# Patient Record
Sex: FEMALE | Race: WHITE | NOT HISPANIC OR LATINO | Employment: OTHER | RURAL
[De-identification: names, ages, dates, MRNs, and addresses within clinical notes are randomized per-mention and may not be internally consistent; named-entity substitution may affect disease eponyms.]

---

## 2017-06-13 ENCOUNTER — CONVERSION ENCOUNTER (OUTPATIENT)
Dept: FAMILY MEDICINE CLINIC | Facility: CLINIC | Age: 67
End: 2017-06-13

## 2018-05-02 ENCOUNTER — CONVERSION ENCOUNTER (OUTPATIENT)
Dept: FAMILY MEDICINE CLINIC | Facility: CLINIC | Age: 68
End: 2018-05-02

## 2018-05-08 ENCOUNTER — HOSPITAL ENCOUNTER (OUTPATIENT)
Dept: MAMMOGRAPHY | Facility: HOSPITAL | Age: 68
Discharge: HOME | End: 2018-05-08
Attending: NURSE PRACTITIONER | Admitting: NURSE PRACTITIONER

## 2018-06-14 ENCOUNTER — CONVERSION ENCOUNTER (OUTPATIENT)
Dept: FAMILY MEDICINE CLINIC | Facility: CLINIC | Age: 68
End: 2018-06-14

## 2018-06-14 ENCOUNTER — HOSPITAL ENCOUNTER (OUTPATIENT)
Dept: GENERAL RADIOLOGY | Facility: HOSPITAL | Age: 68
Discharge: HOME | End: 2018-06-14
Attending: NURSE PRACTITIONER | Admitting: NURSE PRACTITIONER

## 2018-07-26 ENCOUNTER — CONVERSION ENCOUNTER (OUTPATIENT)
Dept: FAMILY MEDICINE CLINIC | Facility: CLINIC | Age: 68
End: 2018-07-26

## 2019-06-04 VITALS
HEIGHT: 64 IN | OXYGEN SATURATION: 95 % | HEART RATE: 80 BPM | HEIGHT: 64 IN | DIASTOLIC BLOOD PRESSURE: 82 MMHG | RESPIRATION RATE: 16 BRPM | HEART RATE: 76 BPM | HEIGHT: 64 IN | RESPIRATION RATE: 16 BRPM | SYSTOLIC BLOOD PRESSURE: 110 MMHG | HEART RATE: 73 BPM | OXYGEN SATURATION: 98 % | WEIGHT: 151 LBS | WEIGHT: 154.8 LBS | RESPIRATION RATE: 16 BRPM | BODY MASS INDEX: 26.26 KG/M2 | BODY MASS INDEX: 26.22 KG/M2 | DIASTOLIC BLOOD PRESSURE: 78 MMHG | HEART RATE: 80 BPM | SYSTOLIC BLOOD PRESSURE: 119 MMHG | WEIGHT: 153.6 LBS | BODY MASS INDEX: 25.92 KG/M2 | WEIGHT: 153.8 LBS | SYSTOLIC BLOOD PRESSURE: 127 MMHG | DIASTOLIC BLOOD PRESSURE: 74 MMHG | OXYGEN SATURATION: 96 % | RESPIRATION RATE: 16 BRPM | DIASTOLIC BLOOD PRESSURE: 75 MMHG | BODY MASS INDEX: 26.43 KG/M2 | SYSTOLIC BLOOD PRESSURE: 120 MMHG | OXYGEN SATURATION: 95 %

## 2019-06-14 ENCOUNTER — CONVERSION ENCOUNTER (OUTPATIENT)
Dept: FAMILY MEDICINE CLINIC | Facility: CLINIC | Age: 69
End: 2019-06-14

## 2019-06-14 PROBLEM — J30.9 ALLERGIC RHINITIS: Status: ACTIVE | Noted: 2019-06-14

## 2019-06-14 PROBLEM — E78.5 HYPERLIPIDEMIA: Status: ACTIVE | Noted: 2019-06-14

## 2019-06-14 PROBLEM — F41.8 MIXED ANXIETY DEPRESSIVE DISORDER: Status: ACTIVE | Noted: 2017-06-13

## 2019-06-14 PROBLEM — I10 HYPERTENSION: Status: ACTIVE | Noted: 2019-06-14

## 2019-06-14 PROBLEM — R73.03 PREDIABETES: Status: ACTIVE | Noted: 2018-06-14

## 2019-06-14 PROBLEM — G47.00 INSOMNIA: Status: ACTIVE | Noted: 2019-06-14

## 2019-06-14 PROBLEM — E66.3 OVERWEIGHT: Status: ACTIVE | Noted: 2018-05-02

## 2019-06-14 PROBLEM — M19.90 OSTEOARTHRITIS: Status: ACTIVE | Noted: 2017-06-13

## 2019-06-14 LAB
ALBUMIN SERPL-MCNC: 4.4 G/DL (ref 3.6–5.1)
ALBUMIN/GLOB SERPL: ABNORMAL {RATIO} (ref 1–2.5)
ALP SERPL-CCNC: 47 UNITS/L (ref 33–130)
ALT SERPL-CCNC: 21 UNITS/L (ref 6–29)
AST SERPL-CCNC: 24 UNITS/L (ref 10–35)
BASOPHILS # BLD AUTO: ABNORMAL 10*3/MM3 (ref 0–200)
BASOPHILS NFR BLD AUTO: 0.5 %
BILIRUB SERPL-MCNC: 1 MG/DL (ref 0.2–1.2)
BUN SERPL-MCNC: 18 MG/DL (ref 7–25)
BUN/CREAT SERPL: ABNORMAL (ref 6–22)
CALCIUM SERPL-MCNC: 9.8 MG/DL (ref 8.6–10.4)
CHLORIDE SERPL-SCNC: 107 MMOL/L (ref 98–110)
CHOLEST SERPL-MCNC: 172 MG/DL
CHOLEST/HDLC SERPL: ABNORMAL {RATIO}
CO2 CONTENT VENOUS: 25 MMOL/L (ref 20–32)
CONV % HGB A1C: ABNORMAL %
CONV COMMENT: 0.01
CONV NEUTROPHILS/100 LEUKOCYTES IN BODY FLUID BY MANUAL COUNT: 36.6 %
CONV TOTAL PROTEIN: 7.1 G/DL (ref 6.1–8.1)
CREAT UR-MCNC: 1.06 MG/DL (ref 0.6–0.93)
EOSINOPHIL # BLD AUTO: 2.7 %
EOSINOPHIL # BLD AUTO: ABNORMAL 10*3/MM3 (ref 15–500)
ERYTHROCYTE [DISTWIDTH] IN BLOOD BY AUTOMATED COUNT: 12.8 % (ref 11–15)
GLOBULIN UR ELPH-MCNC: ABNORMAL G/DL (ref 1.9–3.7)
GLUCOSE SERPL-MCNC: 101 MG/DL (ref 65–99)
HCT VFR BLD AUTO: 39.6 % (ref 35–45)
HCV AB SER DONR QL: ABNORMAL
HDLC SERPL-MCNC: 48 MG/DL
HGB BLD-MCNC: 13.1 G/DL (ref 11.7–15.5)
LDLC SERPL CALC-MCNC: ABNORMAL MG/DL
LYMPHOCYTES # BLD AUTO: ABNORMAL 10*3/MM3 (ref 850–3900)
LYMPHOCYTES NFR BLD AUTO: 50.6 %
MCH RBC QN AUTO: 31.8 PG (ref 27–33)
MCHC RBC AUTO-ENTMCNC: ABNORMAL % (ref 32–36)
MCV RBC AUTO: 96.1 FL (ref 80–100)
MONOCYTES # BLD AUTO: ABNORMAL 10*3/MICROLITER (ref 200–950)
MONOCYTES NFR BLD AUTO: 9.6 %
NEUTROPHILS # BLD AUTO: ABNORMAL 10*3/MM3 (ref 1500–7800)
PLATELET # BLD AUTO: ABNORMAL 10*3/MM3 (ref 140–400)
PMV BLD AUTO: 9.7 FL (ref 7.5–12.5)
POTASSIUM SERPL-SCNC: 4.3 MMOL/L (ref 3.5–5.3)
RBC # BLD AUTO: ABNORMAL 10*6/MM3 (ref 3.8–5.1)
SODIUM SERPL-SCNC: 140 MMOL/L (ref 135–146)
TRIGL SERPL-MCNC: 111 MG/DL
WBC # BLD AUTO: ABNORMAL K/UL (ref 3.8–10.8)

## 2019-06-14 RX ORDER — CITALOPRAM 20 MG/1
1 TABLET ORAL EVERY 24 HOURS
COMMUNITY
Start: 2015-03-06 | End: 2020-01-13

## 2019-06-14 RX ORDER — ALBUTEROL SULFATE 90 UG/1
2 AEROSOL, METERED RESPIRATORY (INHALATION) EVERY 4 HOURS PRN
COMMUNITY
Start: 2018-06-14 | End: 2020-07-01

## 2019-06-14 RX ORDER — CHLORAL HYDRATE 500 MG
1 CAPSULE ORAL 2 TIMES DAILY
COMMUNITY
Start: 2015-07-09 | End: 2020-02-24

## 2019-06-14 RX ORDER — MONTELUKAST SODIUM 10 MG/1
1 TABLET ORAL
COMMUNITY
Start: 2019-03-06 | End: 2019-09-04 | Stop reason: SDUPTHER

## 2019-06-14 RX ORDER — BIOTIN 10 MG
1 TABLET ORAL DAILY
COMMUNITY
Start: 2016-04-21

## 2019-06-14 RX ORDER — ATORVASTATIN CALCIUM 10 MG/1
1 TABLET, FILM COATED ORAL
COMMUNITY
Start: 2015-04-09 | End: 2019-06-23 | Stop reason: SDUPTHER

## 2019-06-14 RX ORDER — MELATONIN
1 EVERY 24 HOURS
COMMUNITY
Start: 2018-06-14 | End: 2020-07-01

## 2019-06-14 RX ORDER — LOSARTAN POTASSIUM 100 MG/1
1 TABLET ORAL EVERY 24 HOURS
COMMUNITY
Start: 2019-03-06 | End: 2019-09-04 | Stop reason: SDUPTHER

## 2019-06-14 RX ORDER — LANOLIN ALCOHOL/MO/W.PET/CERES
1 CREAM (GRAM) TOPICAL DAILY
COMMUNITY
Start: 2018-05-02

## 2019-06-19 ENCOUNTER — OFFICE VISIT (OUTPATIENT)
Dept: FAMILY MEDICINE CLINIC | Facility: CLINIC | Age: 69
End: 2019-06-19

## 2019-06-19 ENCOUNTER — HOSPITAL ENCOUNTER (OUTPATIENT)
Dept: MAMMOGRAPHY | Facility: HOSPITAL | Age: 69
Discharge: HOME | End: 2019-06-19
Admitting: NURSE PRACTITIONER

## 2019-06-19 VITALS
HEIGHT: 64 IN | BODY MASS INDEX: 25.85 KG/M2 | DIASTOLIC BLOOD PRESSURE: 72 MMHG | SYSTOLIC BLOOD PRESSURE: 113 MMHG | WEIGHT: 151.4 LBS | OXYGEN SATURATION: 98 % | TEMPERATURE: 98.7 F | HEART RATE: 80 BPM | RESPIRATION RATE: 18 BRPM

## 2019-06-19 DIAGNOSIS — Z72.89 OTHER PROBLEMS RELATED TO LIFESTYLE: ICD-10-CM

## 2019-06-19 DIAGNOSIS — F41.8 MIXED ANXIETY DEPRESSIVE DISORDER: ICD-10-CM

## 2019-06-19 DIAGNOSIS — E55.9 VITAMIN D DEFICIENCY: ICD-10-CM

## 2019-06-19 DIAGNOSIS — Z12.39 SCREENING FOR BREAST CANCER: ICD-10-CM

## 2019-06-19 DIAGNOSIS — M19.041 PRIMARY OSTEOARTHRITIS OF BOTH HANDS: ICD-10-CM

## 2019-06-19 DIAGNOSIS — I10 HYPERTENSION, UNSPECIFIED TYPE: ICD-10-CM

## 2019-06-19 DIAGNOSIS — R73.03 PREDIABETES: ICD-10-CM

## 2019-06-19 DIAGNOSIS — Z00.00 PREVENTATIVE HEALTH CARE: Primary | ICD-10-CM

## 2019-06-19 DIAGNOSIS — Z12.39 SCREENING BREAST EXAMINATION: ICD-10-CM

## 2019-06-19 DIAGNOSIS — N28.9 RENAL INSUFFICIENCY: ICD-10-CM

## 2019-06-19 DIAGNOSIS — J30.1 SEASONAL ALLERGIC RHINITIS DUE TO POLLEN: ICD-10-CM

## 2019-06-19 DIAGNOSIS — M85.88 OTHER SPECIFIED DISORDERS OF BONE DENSITY AND STRUCTURE, OTHER SITE: ICD-10-CM

## 2019-06-19 DIAGNOSIS — N60.11 FIBROCYSTIC DISEASE OF RIGHT BREAST: ICD-10-CM

## 2019-06-19 DIAGNOSIS — M19.042 PRIMARY OSTEOARTHRITIS OF BOTH HANDS: ICD-10-CM

## 2019-06-19 DIAGNOSIS — E78.2 MIXED HYPERLIPIDEMIA: ICD-10-CM

## 2019-06-19 LAB
BILIRUB BLD-MCNC: NEGATIVE MG/DL
CLARITY, POC: CLEAR
COLOR UR: YELLOW
GLUCOSE UR STRIP-MCNC: NEGATIVE MG/DL
KETONES UR QL: NEGATIVE
LEUKOCYTE EST, POC: ABNORMAL
NITRITE UR-MCNC: NEGATIVE MG/ML
PH UR: 5.5 [PH] (ref 5–8)
PROT UR STRIP-MCNC: NEGATIVE MG/DL
RBC # UR STRIP: NEGATIVE /UL
SP GR UR: 1.02 (ref 1–1.03)
UROBILINOGEN UR QL: NORMAL

## 2019-06-19 PROCEDURE — 81003 URINALYSIS AUTO W/O SCOPE: CPT | Performed by: NURSE PRACTITIONER

## 2019-06-19 PROCEDURE — 99214 OFFICE O/P EST MOD 30 MIN: CPT | Performed by: NURSE PRACTITIONER

## 2019-06-19 PROCEDURE — G0439 PPPS, SUBSEQ VISIT: HCPCS | Performed by: NURSE PRACTITIONER

## 2019-06-19 PROCEDURE — 77067 SCR MAMMO BI INCL CAD: CPT

## 2019-06-19 PROCEDURE — 77063 BREAST TOMOSYNTHESIS BI: CPT

## 2019-06-19 RX ORDER — LANOLIN ALCOHOL/MO/W.PET/CERES
1 CREAM (GRAM) TOPICAL DAILY
Qty: 90 TABLET | Refills: 0
Start: 2019-06-19 | End: 2020-07-01

## 2019-06-19 NOTE — PATIENT INSTRUCTIONS
Medicare Wellness  Personal Prevention Plan of Service     Date of Office Visit:  2019  Encounter Provider:  LEO Longo  Place of Service:  [facility]  Patient Name: Elizabeth Momin  :  1950    As part of the Medicare Wellness portion of your visit today, we are providing you with this personalized preventive plan of services (PPPS). This plan is based upon recommendations of the [place] Preventive Services Task Force (USPSTF) and the Advisory Committee on Immunization Practices (ACIP).    This lists the preventive care services that should be considered, and provides dates of when you are due. Items listed as completed are up-to-date and do not require any further intervention.    Health Maintenance   Topic Date Due   • ZOSTER VACCINE (1 of 2) 1999   • TDAP/TD VACCINES (2 - Td) 2016   • HEPATITIS C SCREENING  2019   • MEDICARE ANNUAL WELLNESS  2019   • COLONOSCOPY  2019   • LIPID PANEL  2019   • INFLUENZA VACCINE  2019   • DXA SCAN  06/15/2020   • PNEUMOCOCCAL VACCINES (65+ LOW/MEDIUM RISK)  Completed       Orders Placed This Encounter   Procedures   • Mammo Screening Bilateral With CAD     Order Specific Question:   Reason for Exam:     Answer:   screening for breast exam   • Hepatitis C antibody   • Vitamin D 25 hydroxy     Standing Status:   Future     Standing Expiration Date:   2020   • POC Urinalysis Dipstick, Automated       No Follow-up on file.

## 2019-06-19 NOTE — PROGRESS NOTES
Chief Complaint   Patient presents with   • Medicare Wellness-subsequent       Subjective   Elizabeth Momin is a 69 y.o. female that presents for her Annual Medicare Wellness Visit and to follow-up on multiple chronic medical conditions.     Blood Sugar Problem   This is a chronic problem. The current episode started more than 1 year ago. Associated symptoms include arthralgias and fatigue. Pertinent negatives include no abdominal pain, chest pain, chills, congestion, coughing, diaphoresis, fever, joint swelling, myalgias, nausea, neck pain, numbness, rash, sore throat, swollen glands, vomiting or weakness.   Hypertension   This is a chronic problem. The current episode started more than 1 year ago. The problem has been rapidly improving since onset. The problem is controlled. Associated symptoms include anxiety. Pertinent negatives include no blurred vision, chest pain, neck pain, palpitations or shortness of breath. Risk factors for coronary artery disease include dyslipidemia and post-menopausal state. Past treatments include ACE inhibitors. Current antihypertension treatment includes angiotensin blockers. The current treatment provides significant improvement. Compliance problems include diet.    Hyperlipidemia   This is a chronic problem. The current episode started more than 1 year ago. The problem is controlled. Recent lipid tests were reviewed and are variable. Pertinent negatives include no chest pain, myalgias or shortness of breath. Current antihyperlipidemic treatment includes statins. The current treatment provides significant improvement of lipids.   Anxiety   Presents for follow-up visit. Patient reports no chest pain, confusion, depressed mood, dizziness, excessive worry, nausea, nervous/anxious behavior, palpitations, shortness of breath or suicidal ideas. Symptoms occur rarely. The severity of symptoms is mild. The quality of sleep is good. Nighttime awakenings: occasional.     Compliance with  medications is %.        The following portions of the patient's history were reviewed and updated as appropriate: allergies, current medications, past family history, past medical history, past social history, past surgical history and problem list.    Review of Systems   Constitutional: Positive for fatigue. Negative for appetite change, chills, diaphoresis, fever, unexpected weight gain and unexpected weight loss.   HENT: Negative for congestion, ear discharge, ear pain, hearing loss, mouth sores, nosebleeds, postnasal drip, rhinorrhea, sinus pressure, sneezing, sore throat and trouble swallowing.    Eyes: Negative for blurred vision, double vision, pain, discharge, redness and itching.   Respiratory: Negative for apnea, cough, choking, chest tightness, shortness of breath, wheezing and stridor.    Cardiovascular: Negative for chest pain, palpitations and leg swelling.   Gastrointestinal: Negative for abdominal distention, abdominal pain, anal bleeding, blood in stool, constipation, diarrhea, nausea, rectal pain, vomiting, GERD and indigestion.   Endocrine: Negative for cold intolerance, heat intolerance, polydipsia, polyphagia and polyuria.   Genitourinary: Positive for breast lump. Negative for breast discharge, urinary incontinence, difficulty urinating, dysuria, flank pain, frequency, genital sores, hematuria, pelvic pain, urgency, vaginal bleeding, vaginal discharge, vaginal pain and breast pain.        Right breast - unchanged   Musculoskeletal: Positive for arthralgias. Negative for back pain, gait problem, joint swelling, myalgias, neck pain and neck stiffness.        Fingers and knees   Skin: Negative for color change, rash and skin lesions.   Neurological: Negative for dizziness, tremors, seizures, syncope, speech difficulty, weakness, light-headedness, numbness, headache, memory problem and confusion.   Hematological: Negative for adenopathy. Does not bruise/bleed easily.  "  Psychiatric/Behavioral: Negative for self-injury, sleep disturbance, suicidal ideas, depressed mood and stress. The patient is not nervous/anxious.        Objective   /72 (BP Location: Right arm, Patient Position: Sitting, Cuff Size: Large Adult)   Pulse 80   Temp 98.7 °F (37.1 °C) (Oral)   Resp 18   Ht 161.3 cm (63.5\")   Wt 68.7 kg (151 lb 6.4 oz)   SpO2 98%   Breastfeeding? No   BMI 26.40 kg/m²      Physical Exam   Constitutional: She is oriented to person, place, and time. She appears well-developed and well-nourished. No distress.   HENT:   Head: Normocephalic and atraumatic.   Right Ear: Tympanic membrane, external ear and ear canal normal.   Left Ear: Tympanic membrane, external ear and ear canal normal.   Nose: Nose normal. Right sinus exhibits no maxillary sinus tenderness and no frontal sinus tenderness. Left sinus exhibits no maxillary sinus tenderness and no frontal sinus tenderness.   Mouth/Throat: Oropharynx is clear and moist and mucous membranes are normal. No oropharyngeal exudate, posterior oropharyngeal edema or posterior oropharyngeal erythema.   Eyes: Conjunctivae, EOM and lids are normal. Pupils are equal, round, and reactive to light. Right eye exhibits no discharge. Left eye exhibits no discharge.   Neck: Trachea normal and normal range of motion. Neck supple. No JVD present. Carotid bruit is not present. No thyromegaly present.   Cardiovascular: Normal rate, regular rhythm, normal heart sounds and intact distal pulses. Exam reveals no gallop and no friction rub.   No murmur heard.  Pulmonary/Chest: Effort normal and breath sounds normal. No respiratory distress. She has no wheezes. She has no rales. Right breast exhibits mass (Retroareolar irregularly shaped density). Right breast exhibits no inverted nipple, no nipple discharge, no skin change and no tenderness. Left breast exhibits no inverted nipple, no mass, no nipple discharge, no skin change and no tenderness. Breasts " are symmetrical. There is no breast swelling.   Abdominal: Soft. Bowel sounds are normal. She exhibits no mass. There is no tenderness. No hernia.   Musculoskeletal: Normal range of motion. She exhibits no edema or deformity.   Lymphadenopathy:     She has no cervical adenopathy.     She has no axillary adenopathy.   Neurological: She is alert and oriented to person, place, and time. She has normal strength. She displays normal reflexes. No cranial nerve deficit.   Skin: Skin is warm, dry and intact. Capillary refill takes less than 2 seconds. No rash noted.   Psychiatric: She has a normal mood and affect. Her speech is normal and behavior is normal. Judgment and thought content normal. Cognition and memory are normal.     Office Visit on 06/19/2019   Component Date Value Ref Range Status   • Color 06/19/2019 Yellow  Yellow, Straw, Dark Yellow, Kendra Final   • Clarity, UA 06/19/2019 Clear  Clear Final   • Specific Gravity  06/19/2019 1.020  1.005 - 1.030 Final   • pH, Urine 06/19/2019 5.5  5.0 - 8.0 Final   • Leukocytes 06/19/2019 Trace* Negative Final   • Nitrite, UA 06/19/2019 Negative  Negative Final   • Protein, POC 06/19/2019 Negative  Negative mg/dL Final   • Glucose, UA 06/19/2019 Negative  Negative, 1000 mg/dL (3+) mg/dL Final   • Ketones, UA 06/19/2019 Negative  Negative Final   • Urobilinogen, UA 06/19/2019 Normal  Normal Final   • Bilirubin 06/19/2019 Negative  Negative Final   • Blood, UA 06/19/2019 Negative  Negative Final   Conversion Encounter on 06/14/2019   Component Date Value Ref Range Status   • Albumin 06/14/2019 4.4  3.6 - 5.1 g/dL Final   • Alkaline Phosphatase 06/14/2019 47  33 - 130 units/L Final   • Basophils Absolute 06/14/2019 22 CELLS/UL  0 - 200 10*3/mm3 Final   • Basophil Rel % 06/14/2019 0.5  % Final   • Total Bilirubin 06/14/2019 1.0  0.2 - 1.2 mg/dL Final   • BUN 06/14/2019 18  7 - 25 mg/dL Final   • BUN/Creatinine Ratio 06/14/2019 17 (calc)  6 - 22 Final   • Calcium 06/14/2019 9.8   8.6 - 10.4 mg/dL Final   • Chloride 06/14/2019 107  98 - 110 mmol/L Final   • Chol/HDL Ratio 06/14/2019 3.6 (calc)  <5.0 Final   • Total Cholesterol 06/14/2019 172  <200 mg/dL Final   • CO2 CONTENT  06/14/2019 25  20 - 32 mmol/L Final   • Creatinine 06/14/2019 1.06* 0.60 - 0.93 mg/dL Final   • eGFR African Am 06/14/2019 53* > OR = 60 mL/min/1.73m2 Final   • eGFR Non  Am 06/14/2019 62  > OR = 60 mL/min/1.73m2 Final   • Eosinophils Absolute 06/14/2019 119 CELLS/UL  15 - 500 10*3/mm3 Final   • Eosinophil Rel % 06/14/2019 2.7  % Final   • Globulin 06/14/2019 2.7 G/DL (CALC)  1.9 - 3.7 g/dL Final   • Glucose 06/14/2019 101* 65 - 99 mg/dL Final   • Hematocrit 06/14/2019 39.6  35.0 - 45.0 % Final   • HDL Cholesterol 06/14/2019 48* >50 mg/dL Final   • Hemoglobin 06/14/2019 13.1  11.7 - 15.5 g/dL Final   • % Hgb A1C 06/14/2019 5.9 % OF TOTAL HGB* <5.7 % Final   • LDL Cholesterol  06/14/2019 103 MG/DL (CALC)* mg/dL Final   • Lymphocytes Absolute 06/14/2019 2226 CELLS/UL  850 - 3900 10*3/mm3 Final   • Lymphocyte Rel % 06/14/2019 50.6  % Final   • MCH 06/14/2019 31.8  27.0 - 33.0 pg Final   • MCHC 06/14/2019 33.1 G/DL  32.0 - 36.0 % Final   • MCV 06/14/2019 96.1  80.0 - 100.0 fL Final   • Monocyte Rel % 06/14/2019 9.6  % Final   • Monocytes Absolute 06/14/2019 422 CELLS/UL  200 - 950 10*3/microliter Final   • MPV 06/14/2019 9.7  7.5 - 12.5 fL Final   • Neutrophils Absolute 06/14/2019 1610 CELLS/UL  1500 - 7800 10*3/mm3 Final   • Platelets 06/14/2019 268 THOUSAND/UL  140 - 400 10*3/mm3 Final   • Neutrophils, Fluid 06/14/2019 36.6  % Final   • Potassium 06/14/2019 4.3  3.5 - 5.3 mmol/L Final   • Total Protein 06/14/2019 7.1  6.1 - 8.1 g/dL Final   • RBC 06/14/2019 4.12 MILLION/UL  3.80 - 5.10 10*6/mm3 Final   • RDW 06/14/2019 12.8  11.0 - 15.0 % Final   • AST (SGOT) 06/14/2019 24  10 - 35 units/L Final   • ALT (SGPT) 06/14/2019 21  6 - 29 units/L Final   • Sodium 06/14/2019 140  135 - 146 mmol/L Final   • Triglycerides  06/14/2019 111  <150 mg/dL Final   • WBC 06/14/2019 4.4 THOUSAND/UL  3.8 - 10.8 K/uL Final   • A/G Ratio 06/14/2019 1.6 (calc)  1.0 - 2.5 Final         Assessment/Plan   Problems Addressed this Visit        Cardiovascular and Mediastinum    Hyperlipidemia     Reviewed lipid results with pt. LDL mildly elevated. Encouraged healthy diet and regular exercise. Recheck lipids in 1 year.          Hypertension     Stable         Relevant Orders    POC Urinalysis Dipstick, Automated (Completed)       Respiratory    Allergic rhinitis     Stable on current medication.             Musculoskeletal and Integument    Other specified disorders of bone density and structure, other site     Encouraged weight bearing exercise and increased dietary calcium intake.   DEXA due in 2020.          Osteoarthritis     Tylenol PRN. OTC topical agents PRN.   Offered Ortho referral - she declines.             Other    Fibrocystic breast disease     She had screening & diagnostic mammogram last year with ultrasound. Screening mammogram in one year recommended.          Mixed anxiety depressive disorder     Stable.         Prediabetes     A1c increasing.   Encouraged healthy diet regular exercise.   Recheck A1c in 6 months.            Other Visit Diagnoses     Preventative health care    -  Primary    Screening for breast cancer        Other problems related to lifestyle        Relevant Orders    Hepatitis C antibody    Vitamin D deficiency        Relevant Orders    Vitamin D 25 hydroxy    Renal insufficiency        Mild. Avoid NSAIDs. Stay well hydrated. Recheck in 3 months.

## 2019-06-19 NOTE — PROGRESS NOTES
QUICK REFERENCE INFORMATION:  The ABCs of Providing the Annual Wellness Visit   CMS.[facility]    Medicare Annual Wellness Visit      Subjective   History of Present Illness    Elizabeth Momin is a 69 y.o. female who presents for an Annual Wellness Visit. In addition, we addressed the following health issues:***      PMH, PSH, SocHx, FamHx, Allergies, and Medications: Reviewed and updated in the Visit Navigator.     Outpatient Medications Prior to Visit   Medication Sig Dispense Refill   • albuterol sulfate HFA (VENTOLIN HFA) 108 (90 Base) MCG/ACT inhaler Inhale 2 puffs Every 4 (Four) Hours As Needed.     • atorvastatin (LIPITOR) 10 MG tablet Take 1 tablet by mouth every night at bedtime.     • Biotin 10 MG tablet Take 1 tablet by mouth Daily.     • cholecalciferol (VITAMIN D3) 1000 units tablet Take 1 tablet by mouth Daily.     • citalopram (CELEXA) 20 MG tablet Take 1 tablet by mouth Daily.     • Cod Liver Oil capsule Take 1 capsule by mouth Daily.     • folic acid (FOLVITE) 400 MCG tablet Take 1 tablet by mouth Daily.     • losartan (COZAAR) 100 MG tablet Take 1 tablet by mouth Daily.     • montelukast (SINGULAIR) 10 MG tablet Take 1 tablet by mouth every night at bedtime.     • Multiple Vitamin (MULTI-VITAMIN) tablet Take 1 tablet by mouth Daily.     • Omega-3 Fatty Acids (FISH OIL) 1000 MG capsule capsule Take 1 capsule by mouth 2 (Two) Times a Day.     • aspirin 81 MG tablet Take 1 tablet by mouth Every 3 (Three) Days.       No facility-administered medications prior to visit.        Patient Active Problem List   Diagnosis   • Allergic rhinitis   • Fibrocystic breast disease   • Hyperlipidemia   • Hypertension   • Insomnia   • Menopausal syndrome   • Mixed anxiety depressive disorder   • Other specified disorders of bone density and structure, other site   • Overweight   • Prediabetes   • Sinusitis, chronic   • Osteoarthritis   • Temporomandibular joint disorder       Health Habits:  Dental Exam.  {status:77367}  Eye Exam. {status:99348}  Exercise: {numbers; 0-10:65572} times/week.  Current exercise activities include: {misc; exercise types:22889}    Social:  See review in SnapShot activity and in SocHx section of Visit Navigator.    Health Risk Assessment:  The patient has completed a Health Risk Assessment. This has been reviewed with them and has been scanned into {scanned:4460822035} as a separate document.    Current Medical Providers:  Patient Care Team:  Dennise Wiley APRN as PCP - General    The [facility] providers who are involved in the care of this patient are listed above. Additional providers and suppliers are listed below:  ***    Age-appropriate Screening Schedule:  Refer to the list below for future screening recommendations based on patient's age. Orders for these recommended tests are listed in the plan section. The patient has been provided with a written plan.    Health Maintenance   Topic Date Due   • ZOSTER VACCINE (1 of 2) 02/16/1999   • TDAP/TD VACCINES (2 - Td) 09/18/2016   • HEPATITIS C SCREENING  06/04/2019   • MEDICARE ANNUAL WELLNESS  06/04/2019   • COLONOSCOPY  06/04/2019   • LIPID PANEL  06/14/2019   • INFLUENZA VACCINE  08/01/2019   • DXA SCAN  06/15/2020   • PNEUMOCOCCAL VACCINES (65+ LOW/MEDIUM RISK)  Completed       Depression Screen:   PHQ-2/PHQ-9 Depression Screening 6/19/2019   Little interest or pleasure in doing things 0   Feeling down, depressed, or hopeless 0   Total Score 0       Functional and Cognitive Screening:  Functional & Cognitive Status 6/19/2019   Do you have difficulty preparing food and eating? No   Do you have difficulty bathing yourself, getting dressed or grooming yourself? No   Do you have difficulty using the toilet? No   Do you have difficulty moving around from place to place? No   Do you have trouble with steps or getting out of a bed or a chair? No   In the past year have you fallen or experienced a near fall? No   Current Diet Well  "Balanced Diet   Dental Exam Up to date   Eye Exam Up to date   Exercise (times per week) 7 times per week   Current Exercise Activities Include Housecleaning   Do you need help using the phone?  No   Are you deaf or do you have serious difficulty hearing?  No   Do you need help with transportation? No   Do you need help shopping? No   Do you need help preparing meals?  No   Do you need help with housework?  No   Do you need help with laundry? No   Do you need help taking your medications? No   Do you need help managing money? No   Do you ever drive or ride in a car without wearing a seat belt? No   Have you felt unusual stress, anger or loneliness in the last month? No   Who do you live with? Spouse   If you need help, do you have trouble finding someone available to you? No   Have you been bothered in the last four weeks by sexual problems? No   Do you have difficulty concentrating, remembering or making decisions? No       Does the patient have evidence of cognitive impairment? {Yes/No w/ pre-defaulted No:23709::\"No\"}    Advanced Care Planning:  {Advanced Directive Status:12325}    Identification of Risk Factors:  Risk factors include: {; WELLNESS RISK FACTORS:01949}.    Review of Systems    {Ros - complete:41215}    Objective     Vitals:    06/19/19 1409   BP: 113/72   BP Location: Right arm   Patient Position: Sitting   Cuff Size: Large Adult   Pulse: 80   Resp: 18   Temp: 98.7 °F (37.1 °C)   TempSrc: Oral   SpO2: 98%   Weight: 68.7 kg (151 lb 6.4 oz)   Height: 161.3 cm (63.5\")       Body mass index is 26.4 kg/m².    Assessment/Plan   Patient Self-Management and Personalized Health Advice  The patient has been provided with information about: {MC; PERSONALIZED HEALTH ADVICE:51343} and preventive services including:   · {plan:13417}.    Discussed the patient's BMI with her. The BMI {[facility]96409}.    Orders:  Orders Placed This Encounter   Procedures   • Hepatitis C antibody   • Vitamin D " 25 hydroxy   • POC Urinalysis Dipstick, Automated       Follow Up:  Return in about 1 year (around 6/19/2020) for Medicare Wellness.     An After Visit Summary and PPPS with all of these plans were given to the patient.               Problem List Items Addressed This Visit        Cardiovascular and Mediastinum    Hypertension    Relevant Orders    POC Urinalysis Dipstick, Automated (Completed)      Other Visit Diagnoses     Preventative health care    -  Primary    Screening for breast cancer        Other problems related to lifestyle        Relevant Orders    Hepatitis C antibody    Vitamin D deficiency        Relevant Orders    Vitamin D 25 hydroxy

## 2019-06-20 NOTE — ASSESSMENT & PLAN NOTE
She had screening & diagnostic mammogram last year with ultrasound. Screening mammogram in one year recommended.

## 2019-06-20 NOTE — ASSESSMENT & PLAN NOTE
Reviewed lipid results with pt. LDL mildly elevated. Encouraged healthy diet and regular exercise. Recheck lipids in 1 year.

## 2019-06-24 ENCOUNTER — RESULTS ENCOUNTER (OUTPATIENT)
Dept: FAMILY MEDICINE CLINIC | Facility: CLINIC | Age: 69
End: 2019-06-24

## 2019-06-24 DIAGNOSIS — E55.9 VITAMIN D DEFICIENCY: ICD-10-CM

## 2019-06-24 RX ORDER — ATORVASTATIN CALCIUM 10 MG/1
TABLET, FILM COATED ORAL
Qty: 90 TABLET | Refills: 3 | Status: SHIPPED | OUTPATIENT
Start: 2019-06-24 | End: 2020-07-01 | Stop reason: SDUPTHER

## 2019-07-03 DIAGNOSIS — N64.89 BREAST ASYMMETRY IN FEMALE: Primary | ICD-10-CM

## 2019-08-06 ENCOUNTER — HOSPITAL ENCOUNTER (OUTPATIENT)
Dept: MAMMOGRAPHY | Facility: HOSPITAL | Age: 69
Discharge: HOME | End: 2019-08-06
Admitting: NURSE PRACTITIONER

## 2019-08-06 ENCOUNTER — HOSPITAL ENCOUNTER (OUTPATIENT)
Dept: ULTRASOUND IMAGING | Facility: HOSPITAL | Age: 69
Discharge: HOME | End: 2019-08-06

## 2019-08-06 ENCOUNTER — TELEPHONE (OUTPATIENT)
Dept: FAMILY MEDICINE CLINIC | Facility: CLINIC | Age: 69
End: 2019-08-06

## 2019-08-06 DIAGNOSIS — N64.89 BREAST ASYMMETRY IN FEMALE: ICD-10-CM

## 2019-08-06 DIAGNOSIS — N63.10 BREAST MASS, RIGHT: Primary | ICD-10-CM

## 2019-08-06 PROCEDURE — G0279 TOMOSYNTHESIS, MAMMO: HCPCS

## 2019-08-06 PROCEDURE — 76642 ULTRASOUND BREAST LIMITED: CPT

## 2019-08-06 PROCEDURE — 77065 DX MAMMO INCL CAD UNI: CPT

## 2019-08-06 NOTE — TELEPHONE ENCOUNTER
Zee from [facility] called and said that Dr. Mills is recommending a right beast biopsy. They have already spoken to patient and scheduled her for 08/08/2019. Order is in chart and needing you to sign off on please.

## 2019-08-08 ENCOUNTER — TELEPHONE (OUTPATIENT)
Dept: FAMILY MEDICINE CLINIC | Facility: CLINIC | Age: 69
End: 2019-08-08

## 2019-08-08 ENCOUNTER — HOSPITAL ENCOUNTER (OUTPATIENT)
Dept: MAMMOGRAPHY | Facility: HOSPITAL | Age: 69
Discharge: HOME | End: 2019-08-08
Admitting: NURSE PRACTITIONER

## 2019-08-08 DIAGNOSIS — R92.8 ABNORMAL MAMMOGRAM OF RIGHT BREAST: ICD-10-CM

## 2019-08-08 DIAGNOSIS — N28.9 RENAL INSUFFICIENCY: Primary | ICD-10-CM

## 2019-08-08 PROCEDURE — 88360 TUMOR IMMUNOHISTOCHEM/MANUAL: CPT | Performed by: NURSE PRACTITIONER

## 2019-08-08 PROCEDURE — A4648 IMPLANTABLE TISSUE MARKER: HCPCS

## 2019-08-08 PROCEDURE — 25010000003 LIDOCAINE 1 % SOLUTION: Performed by: NURSE PRACTITIONER

## 2019-08-08 PROCEDURE — 88305 TISSUE EXAM BY PATHOLOGIST: CPT | Performed by: NURSE PRACTITIONER

## 2019-08-08 PROCEDURE — 88360 TUMOR IMMUNOHISTOCHEM/MANUAL: CPT

## 2019-08-08 RX ORDER — LIDOCAINE HYDROCHLORIDE AND EPINEPHRINE 10; 10 MG/ML; UG/ML
20 INJECTION, SOLUTION INFILTRATION; PERINEURAL ONCE
Status: COMPLETED | OUTPATIENT
Start: 2019-08-08 | End: 2019-08-08

## 2019-08-08 RX ORDER — LIDOCAINE HYDROCHLORIDE 10 MG/ML
3 INJECTION, SOLUTION INFILTRATION; PERINEURAL ONCE
Status: COMPLETED | OUTPATIENT
Start: 2019-08-08 | End: 2019-08-08

## 2019-08-08 RX ADMIN — LIDOCAINE HYDROCHLORIDE 3 ML: 10 INJECTION, SOLUTION INFILTRATION; PERINEURAL at 13:12

## 2019-08-08 RX ADMIN — LIDOCAINE HYDROCHLORIDE AND EPINEPHRINE 20 ML: 10; 10 INJECTION, SOLUTION INFILTRATION; PERINEURAL at 13:13

## 2019-08-08 NOTE — TELEPHONE ENCOUNTER
----- Message from LEO Longo sent at 6/19/2019  2:50 PM EDT -----  Regarding: bmp due  Have patient get bmp TO F/UP on kidney test.

## 2019-08-10 LAB
BUN SERPL-MCNC: 12 MG/DL (ref 8–27)
BUN/CREAT SERPL: 13 (ref 12–28)
CALCIUM SERPL-MCNC: 10 MG/DL (ref 8.7–10.3)
CHLORIDE SERPL-SCNC: 103 MMOL/L (ref 96–106)
CO2 SERPL-SCNC: 23 MMOL/L (ref 20–29)
CREAT SERPL-MCNC: 0.89 MG/DL (ref 0.57–1)
GLUCOSE SERPL-MCNC: 93 MG/DL (ref 65–99)
POTASSIUM SERPL-SCNC: 4.2 MMOL/L (ref 3.5–5.2)
SODIUM SERPL-SCNC: 139 MMOL/L (ref 134–144)

## 2019-08-12 ENCOUNTER — TELEPHONE (OUTPATIENT)
Dept: FAMILY MEDICINE CLINIC | Facility: CLINIC | Age: 69
End: 2019-08-12

## 2019-08-12 DIAGNOSIS — C50.911 PRIMARY INVASIVE MALIGNANT NEOPLASM OF FEMALE BREAST, RIGHT (HCC): Primary | ICD-10-CM

## 2019-08-12 NOTE — TELEPHONE ENCOUNTER
Called patient on cell phone and was unable to reach, left message on machine. Attempted to call patients home number and there was no answer.

## 2019-08-12 NOTE — TELEPHONE ENCOUNTER
Please see if patient can come in sometime this morning before 10:30 to go over her breast biopsy results.

## 2019-08-13 ENCOUNTER — RESULTS ENCOUNTER (OUTPATIENT)
Dept: FAMILY MEDICINE CLINIC | Facility: CLINIC | Age: 69
End: 2019-08-13

## 2019-08-13 DIAGNOSIS — N28.9 RENAL INSUFFICIENCY: ICD-10-CM

## 2019-08-13 LAB
LAB AP CASE REPORT: NORMAL
LAB AP DIAGNOSIS COMMENT: NORMAL
LAB AP IHC HER2/NEU REPORT,ADDENDUM: NORMAL
PATH REPORT.FINAL DX SPEC: NORMAL
PATH REPORT.GROSS SPEC: NORMAL

## 2019-09-05 RX ORDER — LOSARTAN POTASSIUM 100 MG/1
TABLET ORAL
Qty: 90 TABLET | Refills: 0 | Status: SHIPPED | OUTPATIENT
Start: 2019-09-05 | End: 2019-12-05 | Stop reason: SDUPTHER

## 2019-09-05 RX ORDER — MONTELUKAST SODIUM 10 MG/1
TABLET ORAL
Qty: 90 TABLET | Refills: 0 | Status: SHIPPED | OUTPATIENT
Start: 2019-09-05 | End: 2020-02-10

## 2019-11-11 ENCOUNTER — OFFICE VISIT (OUTPATIENT)
Dept: FAMILY MEDICINE CLINIC | Facility: CLINIC | Age: 69
End: 2019-11-11

## 2019-11-11 VITALS
RESPIRATION RATE: 18 BRPM | TEMPERATURE: 98.3 F | OXYGEN SATURATION: 98 % | HEART RATE: 104 BPM | BODY MASS INDEX: 25.2 KG/M2 | HEIGHT: 64 IN | SYSTOLIC BLOOD PRESSURE: 100 MMHG | DIASTOLIC BLOOD PRESSURE: 63 MMHG | WEIGHT: 147.6 LBS

## 2019-11-11 DIAGNOSIS — R50.9 FEVER, UNSPECIFIED FEVER CAUSE: Primary | ICD-10-CM

## 2019-11-11 PROBLEM — Z17.0: Status: ACTIVE | Noted: 2019-08-19

## 2019-11-11 PROBLEM — C50.911: Status: ACTIVE | Noted: 2019-08-19

## 2019-11-11 PROBLEM — C50.911 BREAST CANCER, RIGHT (HCC): Status: ACTIVE | Noted: 2019-08-21

## 2019-11-11 PROBLEM — I10 HYPERTENSION: Status: ACTIVE | Noted: 2019-08-19

## 2019-11-11 PROBLEM — R73.03 PREDIABETES: Status: ACTIVE | Noted: 2019-08-19

## 2019-11-11 PROBLEM — Z90.11 S/P RIGHT MASTECTOMY: Status: ACTIVE | Noted: 2019-10-25

## 2019-11-11 PROBLEM — E78.5 DYSLIPIDEMIA: Status: ACTIVE | Noted: 2019-08-19

## 2019-11-11 LAB
EXPIRATION DATE: NORMAL
FLUAV AG NPH QL: NEGATIVE
FLUBV AG NPH QL: NEGATIVE
INTERNAL CONTROL: NORMAL
Lab: NORMAL

## 2019-11-11 PROCEDURE — 87804 INFLUENZA ASSAY W/OPTIC: CPT | Performed by: FAMILY MEDICINE

## 2019-11-11 PROCEDURE — 99213 OFFICE O/P EST LOW 20 MIN: CPT | Performed by: FAMILY MEDICINE

## 2019-11-11 RX ORDER — LETROZOLE 2.5 MG/1
2.5 TABLET, FILM COATED ORAL DAILY
COMMUNITY
Start: 2019-09-20

## 2019-11-11 NOTE — PROGRESS NOTES
Fever    This is a new problem. The current episode started today. The problem occurs 2 to 4 times per day. The problem has been unchanged. The maximum temperature noted was 100 to 100.9 F. The temperature was taken using an oral thermometer. Associated symptoms include congestion. Pertinent negatives include no abdominal pain, chest pain, coughing, diarrhea, ear pain, headaches, nausea, rash, sore throat or vomiting. She has tried NSAIDs for the symptoms. The treatment provided moderate relief.     Past Medical History:   Diagnosis Date   • Allergic rhinitis    • Anxiety    • Chronic insomnia    • Chronic sinusitis    • Depression    • Fibrocystic breast disease    • History of skin cancer    • Hyperlipidemia    • Hypertension    • Osteoarthritis    • Osteopenia    • Pre-diabetes    • Primary invasive malignant neoplasm of female breast, right (CMS/HCC) 08/2019     Past Surgical History:   Procedure Laterality Date   • APPENDECTOMY  1968   • BREAST BIOPSY Right 08/2019    Invasive Mammary Carcinoma   • MASTECTOMY Right 10/02/2019    Fabrizio Bernabe   • MASTECTOMY PARTIAL WITH AXILLARY LYMPH NODE EXCISION Right 09/13/2019    Dr. Storm Dinh   • TOTAL ABDOMINAL HYSTERECTOMY WITH SALPINGO OOPHORECTOMY  1995    Fibroids     Family History   Problem Relation Age of Onset   • Hypertension Mother    • Stroke Mother    • Diabetes Sister    • Hypertension Sister    • Mental illness Sister         Psychiatric Care - Paranoid Schizophrenia   • Hypertension Brother    • Cancer Other         Aunt Colon Cancer. Nephew Lung Cancer (heavy smoker).      Social History     Tobacco Use   • Smoking status: Never Smoker   • Smokeless tobacco: Never Used   • Tobacco comment: Passive Smoke Exposure: No    Substance Use Topics   • Alcohol use: No     Frequency: Never     PHQ-2 Depression Screening  Little interest or pleasure in doing things?     Feeling down, depressed, or hopeless?     PHQ-2 Total Score       PHQ-9 Depression  "Screening  Little interest or pleasure in doing things?     Feeling down, depressed, or hopeless?     Trouble falling or staying asleep, or sleeping too much?     Feeling tired or having little energy?     Poor appetite or overeating?     Feeling bad about yourself - or that you are a failure or have let yourself or your family down?     Trouble concentrating on things, such as reading the newspaper or watching television?     Moving or speaking so slowly that other people could have noticed? Or the opposite - being so fidgety or restless that you have been moving around a lot more than usual?     Thoughts that you would be better off dead, or of hurting yourself in some way?     PHQ-9 Total Score     If you checked off any problems, how difficult have these problems made it for you to do your work, take care of things at home, or get along with other people?         Review of Systems   Constitutional: Positive for fever. Negative for fatigue.   HENT: Positive for congestion, postnasal drip, rhinorrhea, sinus pressure, sneezing and swollen glands. Negative for ear pain and sore throat.    Respiratory: Positive for shortness of breath. Negative for cough.    Cardiovascular: Positive for palpitations. Negative for chest pain.   Gastrointestinal: Negative for abdominal pain, diarrhea, nausea, vomiting, GERD and indigestion.   Skin: Negative for rash.   Neurological: Negative for headache.     Vitals:    11/11/19 1322   BP: 100/63   BP Location: Left arm   Patient Position: Sitting   Cuff Size: Adult   Pulse: 104   Resp: 18   Temp: 98.3 °F (36.8 °C)   TempSrc: Oral   SpO2: 98%   Weight: 67 kg (147 lb 9.6 oz)   Height: 161.3 cm (63.5\")     Body mass index is 25.74 kg/m².  Physical Exam   Constitutional: She appears well-developed and well-nourished. No distress.   HENT:   Head: Normocephalic and atraumatic.   Right Ear: Hearing, tympanic membrane, external ear and ear canal normal.   Left Ear: Hearing, tympanic membrane, " external ear and ear canal normal.   Nose: Nose normal.   Mouth/Throat: Uvula is midline, oropharynx is clear and moist and mucous membranes are normal. Tonsils are 0 on the right. Tonsils are 0 on the left. No tonsillar exudate.   Pulmonary/Chest: Effort normal and breath sounds normal.     Office Visit on 11/11/2019   Component Date Value Ref Range Status   • Rapid Influenza A Ag 11/11/2019 Negative  Negative Final   • Rapid Influenza B Ag 11/11/2019 Negative  Negative Final   • Internal Control 11/11/2019 Passed  Passed Final   • Lot Number 11/11/2019 [id number]   Final   • Expiration Date 11/11/2019 [id number]   Final         Diagnoses and all orders for this visit:    1. Fever, unspecified fever cause (Primary)  Assessment & Plan:  Increase fluids. Tylenol and Advil prn. Report worsening or persistence of symptoms. Discussed medications to help with symptoms of upper respiratory infection including cough suppressants, decongestants, anti-inflammatory medications, antihistamines, and antipyretics.  Patient was given a list of medications with appropriate dosages. Flu swab is negative.     Orders:  -     POC Influenza A / B

## 2019-11-11 NOTE — ASSESSMENT & PLAN NOTE
Increase fluids. Tylenol and Advil prn. Report worsening or persistence of symptoms. Discussed medications to help with symptoms of upper respiratory infection including cough suppressants, decongestants, anti-inflammatory medications, antihistamines, and antipyretics.  Patient was given a list of medications with appropriate dosages. Flu swab is negative.

## 2019-11-13 ENCOUNTER — CONSULT (OUTPATIENT)
Dept: RADIATION ONCOLOGY | Facility: HOSPITAL | Age: 69
End: 2019-11-13

## 2019-11-13 VITALS
OXYGEN SATURATION: 98 % | DIASTOLIC BLOOD PRESSURE: 85 MMHG | BODY MASS INDEX: 25.37 KG/M2 | RESPIRATION RATE: 18 BRPM | WEIGHT: 148.6 LBS | HEIGHT: 64 IN | TEMPERATURE: 98.6 F | SYSTOLIC BLOOD PRESSURE: 146 MMHG | HEART RATE: 75 BPM

## 2019-11-13 DIAGNOSIS — C50.911: Primary | ICD-10-CM

## 2019-11-13 DIAGNOSIS — Z17.0: Primary | ICD-10-CM

## 2019-11-13 PROCEDURE — G0463 HOSPITAL OUTPT CLINIC VISIT: HCPCS | Performed by: RADIOLOGY

## 2019-11-13 RX ORDER — SENNOSIDES 8.6 MG
650 CAPSULE ORAL
COMMUNITY

## 2019-11-13 NOTE — PROGRESS NOTES
"Radiation Oncology Consult Note    Name: Elizabeth Momin  YOB: 1950  MRN #: [id number]  Date of Service: 11/13/2019  Referring Provider: Dennise Wiley APRN [address] [place] [address]  Primary Care Provider: Dennise Wiley APRN          DIAGNOSIS: Right breast, Multifocal invasive lobular carcinoma, One tumor ER+MO+Her2-; Second tumor is ER+MO-Her2-  Pathologic [id number](sn)M0    Encounter Diagnosis   Name Primary?   • Stage II lobular carcinoma of breast, ER+, right (CMS/HCC) Yes     /85   Pulse 75   Temp 98.6 °F (37 °C) (Oral)   Resp 18   Ht 161.3 cm (63.5\")   Wt 67.4 kg (148 lb 9.6 oz)   SpO2 98%   BMI 25.91 kg/m²     REASON FOR CONSULTATION/CHIEF COMPLAINT:  \"breast cancer--want my radiation closer to home\"  I was asked to see the patient at the request of the referring provider noted below for advice and recommendations regarding this diagnosis and the role of radiation therapy.                              REQUESTING PHYSICIAN:  Dennise Wiley Aprn [address] [place] [address]    RECORDS OBTAINED:  Records of the patients history including those obtained from the referring provider were reviewed and summarized in detail.    HISTORY OF PRESENT ILLNESS:  Elizabeth Momin is a 69 y.o. female who presented after bilateral screening MMG at [facility] on 06/19/19 noting a 11mm asymmetry in the subareolar right breast and a 6 mm developing asymmetry in the lower, slightly inner right breast, posterior depth.  There were no other noted masses or microcalcifications.    On 08/06/19, Right breast diagnostic & u/s: an ill-defined density in the retro-areolar region; no discrete mass; at posterior depth at 5-6:00, there is a 6 mm focal asymmetry with irregular margins measuring 9 cm from the nipple.    She was noted to have a palpable lump within the right retro-areolar region with subtle nipple inversion at that time; the U/S did not show clear " mass.    Stereotactic right breast core biopsy on 08/08/19 was positive for invasive mammary carcinoma, ER+ (100%), MT-, Her2 -.    She elected and went for a right lumpectomy, SLN on 09/13/19: 2 separate tumors, 10 mm lesion and a 18 mm involving the nipple complex.  Both are invasive lobular carcinoma; the subareolar breast lesion did invade the dermis and demonstrated focal perineural invasion; ER/MT were different between the two tumors as noted above.  Two of the 3 sentinel lymph nodes contained macrometastases, focal MARGARET was noted in 1 of the nodes with tumor deposits of 1.5 and 1.4 cms.  She did have new inferior margin focally positive for invasive carcinoma.      She talked with her surgeon, Dr. Dinh about the path and on 10/2/19 went for right total mastectomy, axillary dissection of the right breat: Breast with multiple previous biopsy sites, no residual invasive carcinoma identified; focal atypical lobular hyperplasia (ALH); examined surgical margins are negative, 9 recovered benign lymph nodes negative for metastatic carcinoma.    She saw her surgeon post-operative for sympatomatic seroma and has continued with drains in place.    She saw Dr. Stockton for adjuvant systemic options; Mammaprint study was obtained returning low risk for both tumors and she will not receive adjuvant chemotherapy; She will receive letrozole daily and Zometa every 6 months per chart review.    Clinically, she is doing well but has many questions about her drains; again her mastectomy was on 10/2/19.  Was to see Dr. Dinh this Thursday if drainage was down but it is not so will likely see the week of Marthagiving she notes.   She denies chest wall pain or discomfort; she denies lymphedema or decreased range of motion in the right upper extremity.  I have asked her to ask Dr. Dinh about PT/OT.    She has seen radiation oncology and been recommended PMRT and regional XRT due to size of disease in nodes and MARGARET; but wanted second  opinion and would want XRT closer to home if needed.    The following portions of the patient's history were reviewed and updated as appropriate: allergies, current medications, past family history, past medical history, past social history, past surgical history and problem list. Reviewed with the patient and remain unchanged.    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  MEDICATIONS:   Current Outpatient Medications:   •  albuterol sulfate HFA (VENTOLIN HFA) 108 (90 Base) MCG/ACT inhaler, Inhale 2 puffs Every 4 (Four) Hours As Needed., Disp: , Rfl:   •  atorvastatin (LIPITOR) 10 MG tablet, TAKE 1 TABLET BY MOUTH ONCE DAILY AT BEDTIME, Disp: 90 tablet, Rfl: 3  •  Biotin 10 MG tablet, Take 1 tablet by mouth Daily., Disp: , Rfl:   •  cholecalciferol (VITAMIN D3) 1000 units tablet, Take 1 tablet by mouth Daily., Disp: , Rfl:   •  citalopram (CELEXA) 20 MG tablet, Take 1 tablet by mouth Daily., Disp: , Rfl:   •  Cod Liver Oil capsule, Take 1 capsule by mouth Daily., Disp: , Rfl:   •  folic acid (FOLVITE) 400 MCG tablet, Take 1 tablet by mouth Daily., Disp: , Rfl:   •  glucosamine-chondroitin (MAX GLUCOSAMINE CHONDROITIN) 500-400 MG per tablet, Take 1 tablet by mouth Daily., Disp: 90 tablet, Rfl: 0  •  letrozole (FEMARA) 2.5 MG tablet, Take 2.5 mg by mouth Daily., Disp: , Rfl:   •  losartan (COZAAR) 100 MG tablet, TAKE 1 TABLET BY MOUTH ONCE DAILY, Disp: 90 tablet, Rfl: 0  •  montelukast (SINGULAIR) 10 MG tablet, TAKE 1 TABLET BY MOUTH AT BEDTIME, Disp: 90 tablet, Rfl: 0  •  Multiple Vitamin (MULTI-VITAMIN) tablet, Take 1 tablet by mouth Daily., Disp: , Rfl:   •  Omega-3 Fatty Acids (FISH OIL) 1000 MG capsule capsule, Take  1 capsule by mouth 2 (Two) Times a Day., Disp: , Rfl:   ALLERGIES: No Known Allergies  PAST SURGICAL HISTORY: she has a past surgical history that includes Total abdominal hysterectomy w/ bilateral salpingoophorectomy (); Appendectomy (); Breast biopsy (Right, 2019); mastectomy partial with axillary lymph node excision (Right, 2019); and Mastectomy (Right, 10/02/2019).  PREVIOUS RADIOTHERAPY OR CHEMOTHERAPY:No  FAMILY HISTORY: her family history includes Cancer in an other family member; Diabetes in her sister; Hypertension in her brother, mother, and sister; Mental illness in her sister; Stroke in her mother.  SOCIAL HISTORY: she  reports that she has never smoked. She has never used smokeless tobacco. She reports that she does not drink alcohol or use drugs.  PAIN AND PAIN MANAGEMENT: There were no vitals filed for this visit.  NUTRITIONAL STATUS:  No issues.  KPS: 90  ECO    Review of Systems:   Review of Systems   Allergic/Immunologic: Positive for environmental allergies.   Psychiatric/Behavioral: The patient is nervous/anxious.         Patient takes celexa to manage anxiety.     Patient still has drains in.     Otherwise negative as below.     General: No fevers, chills, weight change, or drenching night sweats. Skin: No rashes or jaundice.  HEENT: No change in vision or hearing, no headaches.  Neck: No dysphagia or masses.  Heme/Lymph: No easy bruising or bleeding.  Respiratory System: No shortness of breath or cough.  Cardiovascular: No chest pain, palpitations, or dyspnea on exertion.  - Pacemaker. GI: No nausea, vomiting, diarrhea, melena, or hematochezia.  : No dysuria or hematuria.  Endocrine: No heat or cold intolerance. Musculoskeletal: No myalgias or arthralgias.  Neuro: No weakness, numbness, syncope, or seizures. Psych: No mood changes or depression. Ext: Denies swelling.        Objective     Vitals:  /85   Pulse 75   Temp 98.6 °F (37 °C) (Oral)   Resp 18   Ht 161.3  "cm (63.5\")   Wt 67.4 kg (148 lb 9.6 oz)   SpO2 98%   BMI 25.91 kg/m²     PHYSICAL EXAM:  GENERAL: in no apparent distress, sitting comfortably in room.    HEENT: normocephalic, atraumatic. Pupils are equal, round, reactive to light. Sclera anicteric. Conjunctiva not injected. Oropharynx without erythema, ulcerations or thrush.   NECK: Supple with no masses.  LYMPHATIC: no cervical, supraclavicular or axillary adenopathy appreciated bilaterally.   CARDIOVASCULAR: S1 & S2 detected; no murmurs, rubs or gallops.  CHEST: clear to auscultation bilaterally; no wheezes, crackles or rubs. Work of breathing normal.  ABDOMEN: bowel sounds present. Abdomen is soft, nontender, nondistended.   MUSCULOSKELETAL: no tenderness to palpation along the spine or scapulae. Normal range of motion.  EXTREMITIES: no clubbing, cyanosis, edema.  SKIN: no erythema, rashes, ulcerations noted.   NEUROLOGIC: cranial nerves II-XII grossly intact bilaterally. No focal neurologic deficits.  PSYCHIATRIC:  alert, aware, and appropriate.  BREASTS : Post-mastectomy-Drains still intact. Medial seroma noted, incision well healed, no adenopathy or lymphedema appreciatecd.    PERTINENT IMAGING/PATHOLOGY/LABS (Medical Decision Making): Mammo Stereotactic Breast Biopsy Initial With & Without Device and Tissue Pathology Exam both performed on 8/8/2019 at [facility] as noted above.    COORDINATION OF CARE: A copy of this note is sent to the referring provider.    PATHOLOGY (Reviewed): As noted above; went over in detail with the patient and her .            IMAGING (Reviewed): Mammo Diagnostic Digital Tomosynthesis Right With CAD  IMPRESSION:  1. Palpable right breast subareolar mass on physical examination. There  is increased density on the mammogram. Sonographic assessment is  difficult due to subareolar position. Recommend surgical consultation  for consideration of excisional biopsy. Consultation should be scheduled  after results from " stereotactic breast biopsy of the right breast focal  asymmetry are obtained.     2. Indeterminate right breast focal asymmetry within the lower right  breast. Recommend stereotactic right breast biopsy for definitive tissue  diagnosis.     The findings and recommendations were discussed with patient at the time  of the examination. The patient will be scheduled and the referring  physician office will be notified by the patient navigator.     BI-RADS ASSESSMENT: BI-RADS 4. Suspicious abnormality.     She did have CT chest w/contrast on 10/19/19: Ordered due to pain--simple appearing seroma present   At right mastectomy operative site; no mets noted.      LABS (Reviewed):  Hematology WBC   Date Value Ref Range Status   11/07/2019 5.52 4.5 - 11.0 10*3/uL Final     RBC   Date Value Ref Range Status   11/07/2019 4.18 4.0 - 5.2 10*6/uL Final     Hemoglobin   Date Value Ref Range Status   11/07/2019 13.3 12.0 - 16.0 g/dL Final     Hematocrit   Date Value Ref Range Status   11/07/2019 40.6 36.0 - 46.0 % Final     Platelets   Date Value Ref Range Status   11/07/2019 281 140 - 440 10*3/uL Final      Chemistry   Lab Results   Component Value Date    GLUCOSE 101 (H) 06/14/2019    BUN 17 11/07/2019    CREATININE 0.8 11/07/2019    EGFRIFNONA 66 08/09/2019    EGFRIFAFRI 76 08/09/2019    BCR 21.3 11/07/2019    K 3.8 11/07/2019    CO2 23 11/07/2019    CALCIUM 9.8 11/07/2019    ALBUMIN 4.4 11/07/2019    LABIL2 1.3 11/07/2019    AST 25 11/07/2019    ALT 25 11/07/2019       Assessment/Plan     ASSESSMENT AND PLAN:  1. Stage II lobular carcinoma of breast, ER+, right (CMS/HCC)     Right breast, Multifocal invasive lobular carcinoma, One tumor ER+CA+Her2-; Second tumor is ER+CA-Her2-  Pathologic [id number](sn)M0  -Initial lumpectomy followed by  mastectomy for + margins and 2/3 SLNs +; EPE noted on one node;  Patient had 9 additional nodes removed and all were negative and no additional cancer was found on removed breast.    - We  discussed the NCCN guidelines in detail with her today.  I also agreed with her prior Radiation Oncologist that her main indication for adjuvant radiation therapy is multiple positive lymph nodes (> 1cm each) with focal extracapsular extension.  She did have a near 2 cm subareolar lesion that demonstrated invasion of the dermis and focal perineural invasion.    -She has clear surgical margins now.  -Mammaprint returned low risk and she therefore will be treated with Letrzole and Zometa.  -I did discuss the [place]-analysis, the prior BC and [place] studies and we discussed what following with adjuvant hormonal therapy would look like.  She is concerned about the local risk even if potential OS benefit would not be seen in the setting of less than 4 nodes.  We spent much time today in this discussion and she will be scheduled for CT simulation to treat the CW and regional lymphatics after her drains are removed.    She is to call us once she has the timeline of her drains for CT simulation.    This assessment comes from my review of the imaging, pathology, physician notes and other pertinent information as mentioned.    TIME SPENT WITH PATIENT:   I spent greater than 60 minutes in face-to-face time with the patient and  minutes of that time were spent in counseling and coordination of care, including review of imaging and pathology; indications, goals, logistics, alternatives and risks - both common and rare - for my recommendations as well as surveillance and potential outcomes.         CC: Dennise Wiley, Dennise Monahan, MD Neville Sheikh MD John A. Cox, MD  11/13/2019  5:10PM      Encounter Approved by:     Jevon López MD  11/24/19  11:50 AM

## 2019-11-13 NOTE — PROGRESS NOTES
Radiation Oncology Consult Note    Name: Elizabeth Momin  YOB: 1950  MRN #: [id number]  Date of Service: 11/13/2019  Referring Provider: Dennise Wiley APRN  [address] [place] [address]  Primary Care Provider: Dennise Wiley APRN          DIAGNOSIS: No diagnosis found.    REASON FOR CONSULTATION/CHIEF COMPLAINT:  ***  I was asked to see the patient at the request of the referring provider noted below for advice and recommendations regarding this diagnosis and the role of radiation therapy.                              REQUESTING PHYSICIAN:  Dennise Wiley Aprn  [address] [place] [address]    RECORDS OBTAINED:  Records of the patients history including those obtained from the referring provider were reviewed and summarized in detail.    HISTORY OF PRESENT ILLNESS:  Elizabeth Momin is a 69 y.o. female         The following portions of the patient's history were reviewed and updated as appropriate: allergies, current medications, past family history, past medical history, past social history, past surgical history and problem list. Reviewed with the patient and remain unchanged.    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  MEDICATIONS:   Current Outpatient Medications:   •  albuterol sulfate HFA (VENTOLIN HFA) 108 (90 Base) MCG/ACT inhaler, Inhale 2 puffs Every 4 (Four) Hours As Needed., Disp: , Rfl:   •  atorvastatin (LIPITOR) 10 MG tablet, TAKE 1 TABLET BY MOUTH ONCE DAILY AT BEDTIME, Disp: 90 tablet, Rfl: 3  •  Biotin 10 MG tablet, Take 1 tablet by mouth Daily., Disp: , Rfl:   •  cholecalciferol (VITAMIN D3) 1000 units tablet, Take 1 tablet by mouth Daily., Disp:  , Rfl:   •  citalopram (CELEXA) 20 MG tablet, Take 1 tablet by mouth Daily., Disp: , Rfl:   •  Cod Liver Oil capsule, Take 1 capsule by mouth Daily., Disp: , Rfl:   •  folic acid (FOLVITE) 400 MCG tablet, Take 1 tablet by mouth Daily., Disp: , Rfl:   •  glucosamine-chondroitin (MAX GLUCOSAMINE CHONDROITIN) 500-400 MG per tablet, Take 1 tablet by mouth Daily., Disp: 90 tablet, Rfl: 0  •  letrozole (FEMARA) 2.5 MG tablet, Take 2.5 mg by mouth Daily., Disp: , Rfl:   •  losartan (COZAAR) 100 MG tablet, TAKE 1 TABLET BY MOUTH ONCE DAILY, Disp: 90 tablet, Rfl: 0  •  montelukast (SINGULAIR) 10 MG tablet, TAKE 1 TABLET BY MOUTH AT BEDTIME, Disp: 90 tablet, Rfl: 0  •  Multiple Vitamin (MULTI-VITAMIN) tablet, Take 1 tablet by mouth Daily., Disp: , Rfl:   •  Omega-3 Fatty Acids (FISH OIL) 1000 MG capsule capsule, Take 1 capsule by mouth 2 (Two) Times a Day., Disp: , Rfl:   ALLERGIES: No Known Allergies  PAST SURGICAL HISTORY: she has a past surgical history that includes Total abdominal hysterectomy w/ bilateral salpingoophorectomy (1995); Appendectomy (1968); Breast biopsy (Right, 08/2019); mastectomy partial with axillary lymph node excision (Right, 09/13/2019); and Mastectomy (Right, 10/02/2019).  PREVIOUS RADIOTHERAPY OR CHEMOTHERAPY: {yes and no:48178}  FAMILY HISTORY: her family history includes Cancer in an other family member; Diabetes in her sister; Hypertension in her brother, mother, and sister; Mental illness in her sister; Stroke in her mother.  SOCIAL HISTORY: she  reports that she has never smoked. She has never used smokeless tobacco. She reports that she does not drink alcohol or use drugs.  PAIN AND PAIN MANAGEMENT: There were no vitals filed for this visit.  NUTRITIONAL STATUS:  Otherwise no issues  KPS: {[facility] Performance Status:94155}  ECOG: {[facility] ECOG Status:69847}       Review of Systems:   Review of Systems     Otherwise negative as below.     General: No fevers, chills, weight change,  or drenching night sweats. Skin: No rashes or jaundice.  HEENT: No change in vision or hearing, no headaches.  Neck: No dysphagia or masses.  Heme/Lymph: No easy bruising or bleeding.  Respiratory System: No shortness of breath or cough.  Cardiovascular: No chest pain, palpitations, or dyspnea on exertion.  +/- Pacemaker. GI: No nausea, vomiting, diarrhea, melena, or hematochezia.  : No dysuria or hematuria.  Endocrine: No heat or cold intolerance. Musculoskeletal: No myalgias or arthralgias.  Neuro: No weakness, numbness, syncope, or seizures. Psych: No mood changes or depression. Ext: Denies swelling.        Objective     Vitals:  There were no vitals filed for this visit.      PHYSICAL EXAM:  GENERAL: in no apparent distress, sitting comfortably in room.    HEENT: normocephalic, atraumatic. Pupils are equal, round, reactive to light. Sclera anicteric. Conjunctiva not injected. Oropharynx without erythema, ulcerations or thrush.   NECK: Supple with no masses.  LYMPHATIC: no cervical, supraclavicular or axillary adenopathy appreciated bilaterally.   CARDIOVASCULAR: S1 & S2 detected; no murmurs, rubs or gallops.  CHEST: clear to auscultation bilaterally; no wheezes, crackles or rubs. Work of breathing normal.  ABDOMEN: bowel sounds present. Abdomen is soft, nontender, nondistended.   MUSCULOSKELETAL: no tenderness to palpation along the spine or scapulae. Normal range of motion.  EXTREMITIES: no clubbing, cyanosis, edema.  SKIN: no erythema, rashes, ulcerations noted.   NEUROLOGIC: cranial nerves II-XII grossly intact bilaterally. No focal neurologic deficits.  PSYCHIATRIC:  alert, aware, and appropriate.  BREASTS *** (Enter for standard Template if female): Left breast unremarkable with no dominant masses, skin changes, discharge or pain; Right breast unremarkable with no dominant masses, skin changes, discharge or pain.  GYN: ***  RECTAL EXAMINATION *** (Enter for standard Template if male): External skin  normal, normal sphincter tone, prostate normal in size and consistency, no nodules, stool was guaiac negative.    PERTINENT IMAGING/PATHOLOGY/LABS (Medical Decision Making):     COORDINATION OF CARE: A copy of this note is sent to the referring provider.    PATHOLOGY (Reviewed):     IMAGING (Reviewed):     LABS (Reviewed):  Hematology WBC   Date Value Ref Range Status   11/07/2019 5.52 4.5 - 11.0 10*3/uL Final     RBC   Date Value Ref Range Status   11/07/2019 4.18 4.0 - 5.2 10*6/uL Final     Hemoglobin   Date Value Ref Range Status   11/07/2019 13.3 12.0 - 16.0 g/dL Final     Hematocrit   Date Value Ref Range Status   11/07/2019 40.6 36.0 - 46.0 % Final     Platelets   Date Value Ref Range Status   11/07/2019 281 140 - 440 10*3/uL Final      Chemistry   Lab Results   Component Value Date    GLUCOSE 101 (H) 06/14/2019    BUN 17 11/07/2019    CREATININE 0.8 11/07/2019    EGFRIFNONA 66 08/09/2019    EGFRIFAFRI 76 08/09/2019    BCR 21.3 11/07/2019    K 3.8 11/07/2019    CO2 23 11/07/2019    CALCIUM 9.8 11/07/2019    ALBUMIN 4.4 11/07/2019    LABIL2 1.3 11/07/2019    AST 25 11/07/2019    ALT 25 11/07/2019       Assessment/Plan     ASSESSMENT AND PLAN:  No diagnosis found.     No orders of the defined types were placed in this encounter.      This assessment comes from my review of the imaging, pathology, physician notes and other pertinent information as mentioned.    DISPOSITION:     [unfilled]    TIME SPENT WITH PATIENT:   I spent greater than *** minutes in face-to-face time with the patient and *** minutes of that time were spent in counseling and coordination of care, including review of imaging and pathology; indications, goals, logistics, alternatives and risks - both common and rare - for my recommendations as well as surveillance and potential outcomes.         CC: Dennise Wiley, Dennise Monahan, LEO Sands MA  11/13/2019  9:10 AM

## 2019-12-02 ENCOUNTER — DOCUMENTATION (OUTPATIENT)
Dept: RADIATION ONCOLOGY | Facility: HOSPITAL | Age: 69
End: 2019-12-02

## 2019-12-02 NOTE — PROGRESS NOTES
Patient called and stated that they had to go back in to clean up her incision and put another drain in.  The drain is supposed to come out on Thursday the 5th.  Instructed patient that we would get the radiation started on Wednesday the 11th to give her time to heal and to make sure the drain gets taken out with no complications.  Patient verbalized understanding.

## 2019-12-06 RX ORDER — LOSARTAN POTASSIUM 100 MG/1
TABLET ORAL
Qty: 90 TABLET | Refills: 0 | Status: SHIPPED | OUTPATIENT
Start: 2019-12-06 | End: 2020-03-07

## 2019-12-11 ENCOUNTER — HOSPITAL ENCOUNTER (OUTPATIENT)
Dept: RADIATION ONCOLOGY | Facility: HOSPITAL | Age: 69
Setting detail: RADIATION/ONCOLOGY SERIES
End: 2019-12-11

## 2019-12-11 ENCOUNTER — HOSPITAL ENCOUNTER (OUTPATIENT)
Dept: RADIATION ONCOLOGY | Facility: HOSPITAL | Age: 69
Discharge: HOME | End: 2019-12-11

## 2019-12-11 PROCEDURE — 77290 THER RAD SIMULAJ FIELD CPLX: CPT | Performed by: RADIOLOGY

## 2019-12-11 PROCEDURE — 77334 RADIATION TREATMENT AID(S): CPT | Performed by: RADIOLOGY

## 2019-12-17 DIAGNOSIS — R73.03 PREDIABETES: Primary | ICD-10-CM

## 2019-12-17 PROCEDURE — 77334 RADIATION TREATMENT AID(S): CPT | Performed by: RADIOLOGY

## 2019-12-17 PROCEDURE — 77300 RADIATION THERAPY DOSE PLAN: CPT | Performed by: RADIOLOGY

## 2019-12-17 PROCEDURE — 77295 3-D RADIOTHERAPY PLAN: CPT | Performed by: RADIOLOGY

## 2019-12-19 LAB — HBA1C MFR BLD: 5.7 % (ref 4.8–5.6)

## 2019-12-23 ENCOUNTER — HOSPITAL ENCOUNTER (OUTPATIENT)
Dept: RADIATION ONCOLOGY | Facility: HOSPITAL | Age: 69
Discharge: HOME | End: 2019-12-23

## 2019-12-23 PROCEDURE — 77280 THER RAD SIMULAJ FIELD SMPL: CPT | Performed by: RADIOLOGY

## 2019-12-23 PROCEDURE — 77412 RADIATION TX DELIVERY LVL 3: CPT | Performed by: RADIOLOGY

## 2019-12-24 ENCOUNTER — HOSPITAL ENCOUNTER (OUTPATIENT)
Dept: RADIATION ONCOLOGY | Facility: HOSPITAL | Age: 69
Discharge: HOME | End: 2019-12-24

## 2019-12-24 PROCEDURE — 77412 RADIATION TX DELIVERY LVL 3: CPT | Performed by: RADIOLOGY

## 2019-12-24 PROCEDURE — 77387 GUIDANCE FOR RADJ TX DLVR: CPT | Performed by: RADIOLOGY

## 2019-12-26 ENCOUNTER — HOSPITAL ENCOUNTER (OUTPATIENT)
Dept: RADIATION ONCOLOGY | Facility: HOSPITAL | Age: 69
Discharge: HOME | End: 2019-12-26

## 2019-12-26 PROCEDURE — 77412 RADIATION TX DELIVERY LVL 3: CPT | Performed by: RADIOLOGY

## 2019-12-26 PROCEDURE — 77387 GUIDANCE FOR RADJ TX DLVR: CPT | Performed by: RADIOLOGY

## 2019-12-26 PROCEDURE — 77336 RADIATION PHYSICS CONSULT: CPT | Performed by: RADIOLOGY

## 2019-12-27 ENCOUNTER — HOSPITAL ENCOUNTER (OUTPATIENT)
Dept: RADIATION ONCOLOGY | Facility: HOSPITAL | Age: 69
Discharge: HOME | End: 2019-12-27

## 2019-12-27 PROCEDURE — 77387 GUIDANCE FOR RADJ TX DLVR: CPT | Performed by: RADIOLOGY

## 2019-12-27 PROCEDURE — 77412 RADIATION TX DELIVERY LVL 3: CPT | Performed by: RADIOLOGY

## 2019-12-30 ENCOUNTER — HOSPITAL ENCOUNTER (OUTPATIENT)
Dept: RADIATION ONCOLOGY | Facility: HOSPITAL | Age: 69
Discharge: HOME | End: 2019-12-30

## 2019-12-30 PROCEDURE — 77387 GUIDANCE FOR RADJ TX DLVR: CPT | Performed by: RADIOLOGY

## 2019-12-30 PROCEDURE — 77412 RADIATION TX DELIVERY LVL 3: CPT | Performed by: RADIOLOGY

## 2019-12-31 ENCOUNTER — HOSPITAL ENCOUNTER (OUTPATIENT)
Dept: RADIATION ONCOLOGY | Facility: HOSPITAL | Age: 69
Discharge: HOME | End: 2019-12-31

## 2019-12-31 PROCEDURE — 77412 RADIATION TX DELIVERY LVL 3: CPT | Performed by: RADIOLOGY

## 2019-12-31 PROCEDURE — 77387 GUIDANCE FOR RADJ TX DLVR: CPT | Performed by: RADIOLOGY

## 2020-01-02 ENCOUNTER — HOSPITAL ENCOUNTER (OUTPATIENT)
Dept: RADIATION ONCOLOGY | Facility: HOSPITAL | Age: 70
Discharge: HOME | End: 2020-01-02

## 2020-01-02 ENCOUNTER — HOSPITAL ENCOUNTER (OUTPATIENT)
Dept: RADIATION ONCOLOGY | Facility: HOSPITAL | Age: 70
Setting detail: RADIATION/ONCOLOGY SERIES
End: 2020-01-02

## 2020-01-02 PROCEDURE — 77412 RADIATION TX DELIVERY LVL 3: CPT | Performed by: RADIOLOGY

## 2020-01-02 PROCEDURE — 77336 RADIATION PHYSICS CONSULT: CPT | Performed by: RADIOLOGY

## 2020-01-02 PROCEDURE — 77387 GUIDANCE FOR RADJ TX DLVR: CPT | Performed by: RADIOLOGY

## 2020-01-03 ENCOUNTER — HOSPITAL ENCOUNTER (OUTPATIENT)
Dept: RADIATION ONCOLOGY | Facility: HOSPITAL | Age: 70
Discharge: HOME | End: 2020-01-03

## 2020-01-03 PROCEDURE — 77387 GUIDANCE FOR RADJ TX DLVR: CPT | Performed by: RADIOLOGY

## 2020-01-03 PROCEDURE — 77412 RADIATION TX DELIVERY LVL 3: CPT | Performed by: RADIOLOGY

## 2020-01-06 ENCOUNTER — HOSPITAL ENCOUNTER (OUTPATIENT)
Dept: RADIATION ONCOLOGY | Facility: HOSPITAL | Age: 70
Discharge: HOME | End: 2020-01-06

## 2020-01-06 PROCEDURE — 77387 GUIDANCE FOR RADJ TX DLVR: CPT | Performed by: RADIOLOGY

## 2020-01-06 PROCEDURE — 77412 RADIATION TX DELIVERY LVL 3: CPT | Performed by: RADIOLOGY

## 2020-01-07 ENCOUNTER — HOSPITAL ENCOUNTER (OUTPATIENT)
Dept: RADIATION ONCOLOGY | Facility: HOSPITAL | Age: 70
Discharge: HOME | End: 2020-01-07

## 2020-01-07 PROCEDURE — 77387 GUIDANCE FOR RADJ TX DLVR: CPT | Performed by: RADIOLOGY

## 2020-01-07 PROCEDURE — 77412 RADIATION TX DELIVERY LVL 3: CPT | Performed by: RADIOLOGY

## 2020-01-08 ENCOUNTER — HOSPITAL ENCOUNTER (OUTPATIENT)
Dept: RADIATION ONCOLOGY | Facility: HOSPITAL | Age: 70
Discharge: HOME | End: 2020-01-08

## 2020-01-08 PROCEDURE — 77387 GUIDANCE FOR RADJ TX DLVR: CPT | Performed by: RADIOLOGY

## 2020-01-08 PROCEDURE — 77412 RADIATION TX DELIVERY LVL 3: CPT | Performed by: RADIOLOGY

## 2020-01-09 ENCOUNTER — HOSPITAL ENCOUNTER (OUTPATIENT)
Dept: RADIATION ONCOLOGY | Facility: HOSPITAL | Age: 70
Discharge: HOME | End: 2020-01-09

## 2020-01-09 PROCEDURE — 77412 RADIATION TX DELIVERY LVL 3: CPT | Performed by: RADIOLOGY

## 2020-01-09 PROCEDURE — 77336 RADIATION PHYSICS CONSULT: CPT | Performed by: RADIOLOGY

## 2020-01-09 PROCEDURE — 77387 GUIDANCE FOR RADJ TX DLVR: CPT | Performed by: RADIOLOGY

## 2020-01-10 ENCOUNTER — HOSPITAL ENCOUNTER (OUTPATIENT)
Dept: RADIATION ONCOLOGY | Facility: HOSPITAL | Age: 70
Discharge: HOME | End: 2020-01-10

## 2020-01-10 PROCEDURE — 77412 RADIATION TX DELIVERY LVL 3: CPT | Performed by: RADIOLOGY

## 2020-01-10 PROCEDURE — 77387 GUIDANCE FOR RADJ TX DLVR: CPT | Performed by: RADIOLOGY

## 2020-01-13 ENCOUNTER — HOSPITAL ENCOUNTER (OUTPATIENT)
Dept: RADIATION ONCOLOGY | Facility: HOSPITAL | Age: 70
Discharge: HOME | End: 2020-01-13

## 2020-01-13 PROCEDURE — 77412 RADIATION TX DELIVERY LVL 3: CPT | Performed by: RADIOLOGY

## 2020-01-13 PROCEDURE — 77387 GUIDANCE FOR RADJ TX DLVR: CPT | Performed by: RADIOLOGY

## 2020-01-13 RX ORDER — CITALOPRAM 20 MG/1
TABLET ORAL
Qty: 90 TABLET | Refills: 1 | Status: SHIPPED | OUTPATIENT
Start: 2020-01-13 | End: 2020-07-01 | Stop reason: SDUPTHER

## 2020-01-14 ENCOUNTER — HOSPITAL ENCOUNTER (OUTPATIENT)
Dept: RADIATION ONCOLOGY | Facility: HOSPITAL | Age: 70
Discharge: HOME | End: 2020-01-14

## 2020-01-14 PROCEDURE — 77387 GUIDANCE FOR RADJ TX DLVR: CPT | Performed by: RADIOLOGY

## 2020-01-14 PROCEDURE — 77412 RADIATION TX DELIVERY LVL 3: CPT | Performed by: RADIOLOGY

## 2020-01-15 ENCOUNTER — HOSPITAL ENCOUNTER (OUTPATIENT)
Dept: RADIATION ONCOLOGY | Facility: HOSPITAL | Age: 70
Discharge: HOME | End: 2020-01-15

## 2020-01-15 PROCEDURE — 77387 GUIDANCE FOR RADJ TX DLVR: CPT | Performed by: RADIOLOGY

## 2020-01-15 PROCEDURE — 77412 RADIATION TX DELIVERY LVL 3: CPT | Performed by: RADIOLOGY

## 2020-01-16 ENCOUNTER — TELEPHONE (OUTPATIENT)
Dept: FAMILY MEDICINE CLINIC | Facility: CLINIC | Age: 70
End: 2020-01-16

## 2020-01-16 ENCOUNTER — HOSPITAL ENCOUNTER (OUTPATIENT)
Dept: RADIATION ONCOLOGY | Facility: HOSPITAL | Age: 70
Discharge: HOME | End: 2020-01-16

## 2020-01-16 PROCEDURE — 77387 GUIDANCE FOR RADJ TX DLVR: CPT | Performed by: RADIOLOGY

## 2020-01-16 PROCEDURE — 77412 RADIATION TX DELIVERY LVL 3: CPT | Performed by: RADIOLOGY

## 2020-01-16 PROCEDURE — 77336 RADIATION PHYSICS CONSULT: CPT | Performed by: RADIOLOGY

## 2020-01-16 NOTE — TELEPHONE ENCOUNTER
Let patient know that her blood sugar is still in the prediabetes range. I thought she had an appointment scheduled with me and I was going to talk with her about it then, but it doesn't look like she has one scheduled. Let her know that it looks a little better than it did in June. She can just schedule her AWV with me in June. Hopefully she will be finished with all of her breast treatments by then.

## 2020-01-17 ENCOUNTER — HOSPITAL ENCOUNTER (OUTPATIENT)
Dept: RADIATION ONCOLOGY | Facility: HOSPITAL | Age: 70
Discharge: HOME | End: 2020-01-17

## 2020-01-17 PROCEDURE — 77412 RADIATION TX DELIVERY LVL 3: CPT | Performed by: RADIOLOGY

## 2020-01-17 PROCEDURE — 77387 GUIDANCE FOR RADJ TX DLVR: CPT | Performed by: RADIOLOGY

## 2020-01-20 ENCOUNTER — HOSPITAL ENCOUNTER (OUTPATIENT)
Dept: RADIATION ONCOLOGY | Facility: HOSPITAL | Age: 70
Discharge: HOME | End: 2020-01-20

## 2020-01-20 PROCEDURE — 77412 RADIATION TX DELIVERY LVL 3: CPT | Performed by: RADIOLOGY

## 2020-01-20 PROCEDURE — 77387 GUIDANCE FOR RADJ TX DLVR: CPT | Performed by: RADIOLOGY

## 2020-01-21 ENCOUNTER — HOSPITAL ENCOUNTER (OUTPATIENT)
Dept: RADIATION ONCOLOGY | Facility: HOSPITAL | Age: 70
Discharge: HOME | End: 2020-01-21

## 2020-01-21 PROCEDURE — 77412 RADIATION TX DELIVERY LVL 3: CPT | Performed by: RADIOLOGY

## 2020-01-21 PROCEDURE — 77387 GUIDANCE FOR RADJ TX DLVR: CPT | Performed by: RADIOLOGY

## 2020-01-22 ENCOUNTER — HOSPITAL ENCOUNTER (OUTPATIENT)
Dept: RADIATION ONCOLOGY | Facility: HOSPITAL | Age: 70
Discharge: HOME | End: 2020-01-22

## 2020-01-22 PROCEDURE — 77387 GUIDANCE FOR RADJ TX DLVR: CPT | Performed by: RADIOLOGY

## 2020-01-22 PROCEDURE — 77412 RADIATION TX DELIVERY LVL 3: CPT | Performed by: RADIOLOGY

## 2020-01-23 ENCOUNTER — HOSPITAL ENCOUNTER (OUTPATIENT)
Dept: RADIATION ONCOLOGY | Facility: HOSPITAL | Age: 70
Discharge: HOME | End: 2020-01-23

## 2020-01-23 PROCEDURE — 77412 RADIATION TX DELIVERY LVL 3: CPT | Performed by: RADIOLOGY

## 2020-01-23 PROCEDURE — 77336 RADIATION PHYSICS CONSULT: CPT | Performed by: RADIOLOGY

## 2020-01-23 PROCEDURE — 77387 GUIDANCE FOR RADJ TX DLVR: CPT | Performed by: RADIOLOGY

## 2020-01-24 ENCOUNTER — HOSPITAL ENCOUNTER (OUTPATIENT)
Dept: RADIATION ONCOLOGY | Facility: HOSPITAL | Age: 70
Discharge: HOME | End: 2020-01-24

## 2020-01-24 PROCEDURE — 77387 GUIDANCE FOR RADJ TX DLVR: CPT | Performed by: RADIOLOGY

## 2020-01-24 PROCEDURE — 77412 RADIATION TX DELIVERY LVL 3: CPT | Performed by: RADIOLOGY

## 2020-01-27 ENCOUNTER — HOSPITAL ENCOUNTER (OUTPATIENT)
Dept: RADIATION ONCOLOGY | Facility: HOSPITAL | Age: 70
Discharge: HOME | End: 2020-01-27

## 2020-01-27 PROCEDURE — 77387 GUIDANCE FOR RADJ TX DLVR: CPT | Performed by: RADIOLOGY

## 2020-01-27 PROCEDURE — 77412 RADIATION TX DELIVERY LVL 3: CPT | Performed by: RADIOLOGY

## 2020-01-28 ENCOUNTER — HOSPITAL ENCOUNTER (OUTPATIENT)
Dept: RADIATION ONCOLOGY | Facility: HOSPITAL | Age: 70
Setting detail: RADIATION/ONCOLOGY SERIES
Discharge: HOME | End: 2020-01-28

## 2020-01-28 PROCEDURE — 77387 GUIDANCE FOR RADJ TX DLVR: CPT | Performed by: RADIOLOGY

## 2020-01-28 PROCEDURE — 77412 RADIATION TX DELIVERY LVL 3: CPT | Performed by: RADIOLOGY

## 2020-01-30 PROCEDURE — 77336 RADIATION PHYSICS CONSULT: CPT | Performed by: RADIOLOGY

## 2020-02-05 ENCOUNTER — OFFICE VISIT (OUTPATIENT)
Dept: RADIATION ONCOLOGY | Facility: HOSPITAL | Age: 70
End: 2020-02-05

## 2020-02-05 DIAGNOSIS — C50.911: Primary | ICD-10-CM

## 2020-02-05 DIAGNOSIS — Z17.0: Primary | ICD-10-CM

## 2020-02-05 PROCEDURE — G0463 HOSPITAL OUTPT CLINIC VISIT: HCPCS | Performed by: RADIOLOGY

## 2020-02-10 RX ORDER — MONTELUKAST SODIUM 10 MG/1
TABLET ORAL
Qty: 90 TABLET | Refills: 1 | Status: SHIPPED | OUTPATIENT
Start: 2020-02-10 | End: 2020-06-05

## 2020-02-24 ENCOUNTER — OFFICE VISIT (OUTPATIENT)
Dept: RADIATION ONCOLOGY | Facility: HOSPITAL | Age: 70
End: 2020-02-24

## 2020-02-24 VITALS
TEMPERATURE: 98.2 F | HEART RATE: 98 BPM | WEIGHT: 151.2 LBS | OXYGEN SATURATION: 97 % | DIASTOLIC BLOOD PRESSURE: 85 MMHG | BODY MASS INDEX: 25.81 KG/M2 | RESPIRATION RATE: 19 BRPM | HEIGHT: 64 IN | SYSTOLIC BLOOD PRESSURE: 136 MMHG

## 2020-02-24 DIAGNOSIS — C50.911: Primary | ICD-10-CM

## 2020-02-24 DIAGNOSIS — Z17.0: Primary | ICD-10-CM

## 2020-02-24 PROCEDURE — G0463 HOSPITAL OUTPT CLINIC VISIT: HCPCS | Performed by: RADIOLOGY

## 2020-03-07 RX ORDER — LOSARTAN POTASSIUM 100 MG/1
TABLET ORAL
Qty: 90 TABLET | Refills: 0 | Status: SHIPPED | OUTPATIENT
Start: 2020-03-07 | End: 2020-04-16

## 2020-03-10 NOTE — PROGRESS NOTES
Radiation Oncology FU Note  DOS: 2/5/2020    Date of completion: 01/28/2020    This is a short interval skin check note:    1 week skin check and toxicity assessment      She has had no further progression of grade I reaction.  CTAb  RRR    She is going out of town to [place] and wanted to be seen today with skin care instructions.  We will see her at the end of he month.

## 2020-04-16 RX ORDER — LOSARTAN POTASSIUM 100 MG/1
TABLET ORAL
Qty: 90 TABLET | Refills: 0 | Status: SHIPPED | OUTPATIENT
Start: 2020-04-16 | End: 2020-07-01 | Stop reason: SDUPTHER

## 2020-06-05 RX ORDER — MONTELUKAST SODIUM 10 MG/1
TABLET ORAL
Qty: 90 TABLET | Refills: 0 | Status: SHIPPED | OUTPATIENT
Start: 2020-06-05 | End: 2020-12-28 | Stop reason: SDUPTHER

## 2020-06-15 ENCOUNTER — TELEPHONE (OUTPATIENT)
Dept: FAMILY MEDICINE CLINIC | Facility: CLINIC | Age: 70
End: 2020-06-15

## 2020-06-15 DIAGNOSIS — N95.1 MENOPAUSAL SYNDROME: ICD-10-CM

## 2020-06-15 DIAGNOSIS — R73.03 PREDIABETES: ICD-10-CM

## 2020-06-15 DIAGNOSIS — I10 HYPERTENSION, UNSPECIFIED TYPE: ICD-10-CM

## 2020-06-15 DIAGNOSIS — E78.2 MIXED HYPERLIPIDEMIA: Primary | ICD-10-CM

## 2020-06-15 DIAGNOSIS — E55.9 VITAMIN D DEFICIENCY: ICD-10-CM

## 2020-06-15 DIAGNOSIS — Z12.39 SCREENING FOR BREAST CANCER: ICD-10-CM

## 2020-06-15 DIAGNOSIS — Z12.31 BREAST CANCER SCREENING BY MAMMOGRAM: ICD-10-CM

## 2020-06-15 NOTE — TELEPHONE ENCOUNTER
-I believe she had a total right breast mastectomy. So she will only need the left breast done as screening. You can verify this with her. ( I think Radiology already has it ordered that way).   - It also looks like she is due for her DEXA scan. Last one was done in June 2018.   - Labs look good. Thanks.

## 2020-06-29 LAB
1,25(OH)2D3 SERPL-MCNC: 76.8 PG/ML (ref 19.9–79.3)
ALBUMIN SERPL-MCNC: 4.4 G/DL (ref 3.7–4.7)
ALBUMIN/GLOB SERPL: 1.6 {RATIO} (ref 1.2–2.2)
ALP SERPL-CCNC: 45 IU/L (ref 39–117)
ALT SERPL-CCNC: 31 IU/L (ref 0–32)
AST SERPL-CCNC: 29 IU/L (ref 0–40)
BASOPHILS # BLD AUTO: 0 X10E3/UL (ref 0–0.2)
BASOPHILS NFR BLD AUTO: 1 %
BILIRUB SERPL-MCNC: 0.5 MG/DL (ref 0–1.2)
BUN SERPL-MCNC: 14 MG/DL (ref 8–27)
BUN/CREAT SERPL: 15 (ref 12–28)
CALCIUM SERPL-MCNC: 9.7 MG/DL (ref 8.7–10.3)
CHLORIDE SERPL-SCNC: 106 MMOL/L (ref 96–106)
CHOLEST SERPL-MCNC: 167 MG/DL (ref 100–199)
CO2 SERPL-SCNC: 26 MMOL/L (ref 20–29)
CREAT SERPL-MCNC: 0.94 MG/DL (ref 0.57–1)
EOSINOPHIL # BLD AUTO: 0.2 X10E3/UL (ref 0–0.4)
EOSINOPHIL NFR BLD AUTO: 5 %
ERYTHROCYTE [DISTWIDTH] IN BLOOD BY AUTOMATED COUNT: 12.8 % (ref 11.7–15.4)
GLOBULIN SER CALC-MCNC: 2.8 G/DL (ref 1.5–4.5)
GLUCOSE SERPL-MCNC: 110 MG/DL (ref 65–99)
HBA1C MFR BLD: 5.7 % (ref 4.8–5.6)
HCT VFR BLD AUTO: 38.4 % (ref 34–46.6)
HDLC SERPL-MCNC: 40 MG/DL
HGB BLD-MCNC: 12.5 G/DL (ref 11.1–15.9)
IMM GRANULOCYTES # BLD AUTO: 0 X10E3/UL (ref 0–0.1)
IMM GRANULOCYTES NFR BLD AUTO: 0 %
LDLC SERPL CALC-MCNC: 91 MG/DL (ref 0–99)
LYMPHOCYTES # BLD AUTO: 1.4 X10E3/UL (ref 0.7–3.1)
LYMPHOCYTES NFR BLD AUTO: 33 %
MCH RBC QN AUTO: 32.1 PG (ref 26.6–33)
MCHC RBC AUTO-ENTMCNC: 32.6 G/DL (ref 31.5–35.7)
MCV RBC AUTO: 99 FL (ref 79–97)
MONOCYTES # BLD AUTO: 0.7 X10E3/UL (ref 0.1–0.9)
MONOCYTES NFR BLD AUTO: 15 %
NEUTROPHILS # BLD AUTO: 2 X10E3/UL (ref 1.4–7)
NEUTROPHILS NFR BLD AUTO: 46 %
PLATELET # BLD AUTO: 256 X10E3/UL (ref 150–450)
POTASSIUM SERPL-SCNC: 5 MMOL/L (ref 3.5–5.2)
PROT SERPL-MCNC: 7.2 G/DL (ref 6–8.5)
RBC # BLD AUTO: 3.89 X10E6/UL (ref 3.77–5.28)
SODIUM SERPL-SCNC: 143 MMOL/L (ref 134–144)
TRIGL SERPL-MCNC: 179 MG/DL (ref 0–149)
TSH SERPL DL<=0.005 MIU/L-ACNC: 2.14 UIU/ML (ref 0.45–4.5)
VLDLC SERPL CALC-MCNC: 36 MG/DL (ref 5–40)
WBC # BLD AUTO: 4.3 X10E3/UL (ref 3.4–10.8)

## 2020-07-01 ENCOUNTER — OFFICE VISIT (OUTPATIENT)
Dept: FAMILY MEDICINE CLINIC | Facility: CLINIC | Age: 70
End: 2020-07-01

## 2020-07-01 VITALS
SYSTOLIC BLOOD PRESSURE: 122 MMHG | BODY MASS INDEX: 25.61 KG/M2 | OXYGEN SATURATION: 96 % | HEART RATE: 85 BPM | TEMPERATURE: 98.2 F | RESPIRATION RATE: 16 BRPM | WEIGHT: 150 LBS | DIASTOLIC BLOOD PRESSURE: 78 MMHG | HEIGHT: 64 IN

## 2020-07-01 DIAGNOSIS — R73.03 PREDIABETES: ICD-10-CM

## 2020-07-01 DIAGNOSIS — C50.911: ICD-10-CM

## 2020-07-01 DIAGNOSIS — F41.8 MIXED ANXIETY DEPRESSIVE DISORDER: ICD-10-CM

## 2020-07-01 DIAGNOSIS — M85.89 OSTEOPENIA OF MULTIPLE SITES: ICD-10-CM

## 2020-07-01 DIAGNOSIS — Z12.11 SCREENING FOR COLON CANCER: ICD-10-CM

## 2020-07-01 DIAGNOSIS — R12 HEARTBURN: ICD-10-CM

## 2020-07-01 DIAGNOSIS — F51.01 PRIMARY INSOMNIA: ICD-10-CM

## 2020-07-01 DIAGNOSIS — Z00.01 ENCOUNTER FOR GENERAL ADULT MEDICAL EXAMINATION WITH ABNORMAL FINDINGS: Primary | ICD-10-CM

## 2020-07-01 DIAGNOSIS — M19.041 PRIMARY OSTEOARTHRITIS OF BOTH HANDS: ICD-10-CM

## 2020-07-01 DIAGNOSIS — M19.042 PRIMARY OSTEOARTHRITIS OF BOTH HANDS: ICD-10-CM

## 2020-07-01 DIAGNOSIS — Z12.31 BREAST CANCER SCREENING BY MAMMOGRAM: ICD-10-CM

## 2020-07-01 DIAGNOSIS — J30.1 SEASONAL ALLERGIC RHINITIS DUE TO POLLEN: ICD-10-CM

## 2020-07-01 DIAGNOSIS — I10 ESSENTIAL HYPERTENSION: ICD-10-CM

## 2020-07-01 DIAGNOSIS — E78.2 MIXED HYPERLIPIDEMIA: ICD-10-CM

## 2020-07-01 DIAGNOSIS — Z17.0: ICD-10-CM

## 2020-07-01 PROBLEM — E78.5 DYSLIPIDEMIA: Status: RESOLVED | Noted: 2019-08-19 | Resolved: 2020-07-01

## 2020-07-01 PROBLEM — R50.9 FEVER: Status: RESOLVED | Noted: 2019-11-11 | Resolved: 2020-07-01

## 2020-07-01 PROBLEM — E66.3 OVERWEIGHT: Status: RESOLVED | Noted: 2018-05-02 | Resolved: 2020-07-01

## 2020-07-01 LAB
BILIRUB BLD-MCNC: NEGATIVE MG/DL
CLARITY, POC: CLEAR
COLOR UR: YELLOW
GLUCOSE UR STRIP-MCNC: NEGATIVE MG/DL
KETONES UR QL: NEGATIVE
LEUKOCYTE EST, POC: NEGATIVE
NITRITE UR-MCNC: NEGATIVE MG/ML
PH UR: 5.5 [PH] (ref 5–8)
PROT UR STRIP-MCNC: NEGATIVE MG/DL
RBC # UR STRIP: NEGATIVE /UL
SP GR UR: 1.02 (ref 1–1.03)
UROBILINOGEN UR QL: NORMAL

## 2020-07-01 PROCEDURE — 99214 OFFICE O/P EST MOD 30 MIN: CPT | Performed by: NURSE PRACTITIONER

## 2020-07-01 PROCEDURE — 81003 URINALYSIS AUTO W/O SCOPE: CPT | Performed by: NURSE PRACTITIONER

## 2020-07-01 PROCEDURE — G0439 PPPS, SUBSEQ VISIT: HCPCS | Performed by: NURSE PRACTITIONER

## 2020-07-01 RX ORDER — CITALOPRAM 20 MG/1
20 TABLET ORAL DAILY
Qty: 90 TABLET | Refills: 3 | Status: SHIPPED | OUTPATIENT
Start: 2020-07-01 | End: 2021-03-17

## 2020-07-01 RX ORDER — LOSARTAN POTASSIUM 100 MG/1
100 TABLET ORAL DAILY
Qty: 90 TABLET | Refills: 3 | Status: SHIPPED | OUTPATIENT
Start: 2020-07-01 | End: 2021-09-27

## 2020-07-01 RX ORDER — ACETAMINOPHEN 500 MG
500 TABLET ORAL EVERY 6 HOURS PRN
COMMUNITY
End: 2020-07-01

## 2020-07-01 RX ORDER — ATORVASTATIN CALCIUM 10 MG/1
10 TABLET, FILM COATED ORAL
Qty: 90 TABLET | Refills: 3 | Status: SHIPPED | OUTPATIENT
Start: 2020-07-01 | End: 2021-03-17 | Stop reason: SINTOL

## 2020-07-01 RX ORDER — CETIRIZINE HYDROCHLORIDE 10 MG/1
10 TABLET ORAL DAILY
Qty: 90 TABLET | Refills: 3 | Status: SHIPPED | OUTPATIENT
Start: 2020-07-01 | End: 2021-07-29

## 2020-07-01 RX ORDER — IBUPROFEN 800 MG/1
800 TABLET ORAL EVERY 6 HOURS PRN
COMMUNITY

## 2020-07-01 NOTE — ASSESSMENT & PLAN NOTE
Doing well on current medication.  She reports that sometimes she only takes half a dose and does well with this.

## 2020-07-01 NOTE — PROGRESS NOTES
Medicare Annual Wellness Visit  Chief Complaint   Patient presents with   • Medicare Wellness-subsequent   • Heartburn   • Hyperlipidemia   • Hypertension   • Allergies   • Anxiety   • Osteopenia   • Blood Sugar Problem       Subjective     Elizabeth Momin is a 70 y.o. female who presents for an Annual Wellness Visit. In addition, we addressed the following health issues:    Heartburn   She complains of heartburn. She reports no abdominal pain, no chest pain, no choking, no coughing, no dysphagia, no globus sensation, no hoarse voice, no nausea, no sore throat, no water brash or no wheezing. This is a new problem. The current episode started more than 1 month ago. The problem occurs frequently. The problem has been unchanged. The heartburn duration is several minutes. The heartburn is located in the substernum. The heartburn is of mild intensity. The heartburn does not wake her from sleep. The heartburn does not limit her activity. The heartburn doesn't change with position. Nothing aggravates the symptoms. Associated symptoms include fatigue. Pertinent negatives include no weight loss. Risk factors include NSAIDs. She has tried an antacid for the symptoms. The treatment provided moderate relief.   Hyperlipidemia   This is a chronic problem. The current episode started more than 1 year ago. The problem is controlled. Exacerbating diseases include diabetes. Associated symptoms include myalgias. Pertinent negatives include no chest pain or shortness of breath. Current antihyperlipidemic treatment includes statins. The current treatment provides significant improvement of lipids. Compliance problems include adherence to diet and adherence to exercise.    Hypertension   This is a chronic problem. The current episode started more than 1 year ago. The problem is unchanged. The problem is controlled. Pertinent negatives include no anxiety, blurred vision, chest pain, neck pain, palpitations or shortness of breath.  Current antihypertension treatment includes angiotensin blockers.   Anxiety   Presents for follow-up visit. Patient reports no chest pain, confusion, depressed mood, dizziness, nausea, nervous/anxious behavior, palpitations, shortness of breath or suicidal ideas. Symptoms occur rarely. The severity of symptoms is mild. The quality of sleep is fair. Nighttime awakenings: occasional.     Compliance with medications is %.   Blood Sugar Problem   This is a chronic problem. The current episode started more than 1 year ago. The problem occurs constantly. The problem has been unchanged. Associated symptoms include arthralgias, fatigue and myalgias. Pertinent negatives include no abdominal pain, chest pain, chills, congestion, coughing, diaphoresis, fever, joint swelling, nausea, neck pain, numbness, rash, sore throat, swollen glands, vomiting or weakness.       Medications    Current Outpatient Medications:   •  acetaminophen (TYLENOL) 650 MG 8 hr tablet, Take 650 mg by mouth., Disp: , Rfl:   •  atorvastatin (LIPITOR) 10 MG tablet, Take 1 tablet by mouth every night at bedtime., Disp: 90 tablet, Rfl: 3  •  Biotin 10 MG tablet, Take 1 tablet by mouth Daily., Disp: , Rfl:   •  Calcium Carb-Cholecalciferol (CALCIUM-VITAMIN D) 600-400 MG-UNIT tablet, Take 1 tablet by mouth., Disp: , Rfl:   •  citalopram (CeleXA) 20 MG tablet, Take 1 tablet by mouth Daily., Disp: 90 tablet, Rfl: 3  •  diphenhydrAMINE HCl, Sleep, (SLEEP AID) 25 MG capsule, Take 1 tablet by mouth Daily., Disp: , Rfl:   •  folic acid (FOLVITE) 400 MCG tablet, Take 1 tablet by mouth Daily., Disp: , Rfl:   •  ibuprofen (ADVIL,MOTRIN) 800 MG tablet, Take 800 mg by mouth Every 6 (Six) Hours As Needed., Disp: , Rfl:   •  letrozole (FEMARA) 2.5 MG tablet, Take 2.5 mg by mouth Daily., Disp: , Rfl:   •  losartan (COZAAR) 100 MG tablet, Take 1 tablet by mouth Daily., Disp: 90 tablet, Rfl: 3  •  montelukast (SINGULAIR) 10 MG tablet, TAKE 1 TABLET BY MOUTH AT BEDTIME,  Disp: 90 tablet, Rfl: 0  •  Multiple Vitamin (MULTI-VITAMIN) tablet, Take 1 tablet by mouth Daily., Disp: , Rfl:   •  cetirizine (zyrTEC) 10 MG tablet, Take 1 tablet by mouth Daily., Disp: 90 tablet, Rfl: 3     Problem List  Patient Active Problem List   Diagnosis   • Allergic rhinitis   • Hyperlipidemia   • Hypertension   • Insomnia   • Mixed anxiety depressive disorder   • Osteopenia of multiple sites   • Osteoarthritis   • Temporomandibular joint disorder   • S/P right mastectomy   • Stage II lobular carcinoma of breast, ER+, right (CMS/HCC)   • Prediabetes       Surgical History  Past Surgical History:   Procedure Laterality Date   • APPENDECTOMY  1968   • BREAST BIOPSY Right 08/2019    Invasive Mammary Carcinoma   • MASTECTOMY Right 10/02/2019    Fabrizio Bernabe   • MASTECTOMY PARTIAL WITH AXILLARY LYMPH NODE EXCISION Right 09/13/2019    Dr. Storm Dinh   • TOTAL ABDOMINAL HYSTERECTOMY WITH SALPINGO OOPHORECTOMY  1995    Fibroids      Social History  Social History     Socioeconomic History   • Marital status:      Spouse name: Not on file   • Number of children: Not on file   • Years of education: Not on file   • Highest education level: Not on file   Tobacco Use   • Smoking status: Never Smoker   • Smokeless tobacco: Never Used   • Tobacco comment: Passive Smoke Exposure: No    Substance and Sexual Activity   • Alcohol use: No     Frequency: Never   • Drug use: No        Family History  Family History   Problem Relation Age of Onset   • Hypertension Mother    • Stroke Mother    • Diabetes Sister    • Hypertension Sister    • Mental illness Sister         Psychiatric Care - Paranoid Schizophrenia   • Hypertension Brother    • Cancer Other         Aunt Colon Cancer. Nephew Lung Cancer (heavy smoker).         Health Risk Assessment:  The patient has completed a Health Risk Assessment and it has been reviewed.    Current Medical Providers:  Patient Care Team:  Dennise Wiley APRN as PCP - [facility] [place]  Eugene PUCKETT MD as Consulting Physician (Hematology and Oncology)  Jevon López MD as Consulting Physician (Radiation Oncology)    Age-appropriate Screening Schedule:  Refer to the list below for future screening recommendations based on patient's age. Orders for these recommended tests are listed in the plan section. The patient has been provided with a written plan.    Health Maintenance   Topic Date Due   • ZOSTER VACCINE (1 of 2) 02/16/1999   • TDAP/TD VACCINES (2 - Td) 09/18/2016   • DXA SCAN  06/15/2020   • COLOGUARD  07/10/2020   • INFLUENZA VACCINE  08/01/2020   • LIPID PANEL  06/25/2021   • MEDICARE ANNUAL WELLNESS  07/01/2021   • MAMMOGRAM  08/06/2021   • HEPATITIS C SCREENING  Completed   • Pneumococcal Vaccine Once at 65 Years Old  Completed       Depression Screen:   PHQ-2/PHQ-9 Depression Screening 7/1/2020   Little interest or pleasure in doing things 0   Feeling down, depressed, or hopeless 0   Trouble falling or staying asleep, or sleeping too much -   Feeling tired or having little energy -   Poor appetite or overeating -   Feeling bad about yourself - or that you are a failure or have let yourself or your family down -   Trouble concentrating on things, such as reading the newspaper or watching television -   Moving or speaking so slowly that other people could have noticed. Or the opposite - being so fidgety or restless that you have been moving around a lot more than usual -   Thoughts that you would be better off dead, or of hurting yourself in some way -   Total Score 0   If you checked off any problems, how difficult have these problems made it for you to do your work, take care of things at home, or get along with other people? -       Functional and Cognitive Screening:  Functional & Cognitive Status 7/1/2020   Do you have difficulty preparing food and eating? No   Do you have difficulty bathing yourself, getting dressed or grooming yourself? No   Do you have difficulty using the toilet? No    Do you have difficulty moving around from place to place? No   Do you have trouble with steps or getting out of a bed or a chair? No   Current Diet Well Balanced Diet   Dental Exam Up to date   Eye Exam Up to date   Exercise (times per week) 3 times per week   Current Exercise Activities Include Yard Work   Do you need help using the phone?  No   Are you deaf or do you have serious difficulty hearing?  No   Do you need help with transportation? No   Do you need help shopping? No   Do you need help preparing meals?  No   Do you need help with housework?  No   Do you need help with laundry? No   Do you need help taking your medications? No   Do you need help managing money? No   Do you ever drive or ride in a car without wearing a seat belt? No   Have you felt unusual stress, anger or loneliness in the last month? No   Who do you live with? Spouse   If you need help, do you have trouble finding someone available to you? No   Have you been bothered in the last four weeks by sexual problems? No   Do you have difficulty concentrating, remembering or making decisions? No       Does the patient have evidence of cognitive impairment? No    ATTENTION  What is the year: correct (20 1000)  What is the month of the year: correct (20 1000)  What is the day of the week?: correct (20 1000)  What is the date?: correct (20)  MEMORY  Repeat address three times, only score third attempt: José Causey [address] [place] [address]: 7 (20 1000)  HOW MANY ANIMALS DID THE PATIENT NAME  Verbal Fluency -- Animal Names (0-25): 22+ (20 1000)  CLOCK DRAWING  Clock Drawing: All Correct (20 1000)  MEMORY RECALL  Tell me what you remember about that name and address we were repeating at the beginnin (20 1000)  ACE TOTAL SCORE  Total ACE Score - <25/30 strongly suggests cognitive impairment; <21/30 almost certainly shows dementia: 30 (20 1000)    Advanced Care Planning:  ACP  discussion was held with the patient during this visit. Patient has an advance directive (not in EMR), copy requested.    Identification of Risk Factors:  Risk factors include: Advance Directive Discussion  Breast Cancer/Mammogram Screening  Colon Cancer Screening  Depression/Dysphoria  Immunizations Discussed/Encouraged (specific immunizations; Td, Hepatitis A Vaccine/Series, Influenza and Shingrix )  Inactivity/Sedentary.    Review of Systems   Constitutional: Positive for fatigue. Negative for appetite change, chills, diaphoresis, fever, unexpected weight gain and unexpected weight loss.   HENT: Positive for postnasal drip. Negative for congestion, ear discharge, ear pain, hearing loss, hoarse voice, mouth sores, nosebleeds, rhinorrhea, sinus pressure, sneezing, sore throat, swollen glands and trouble swallowing.    Eyes: Negative for blurred vision, double vision, pain, discharge, redness and itching.   Respiratory: Negative for apnea, cough, choking, shortness of breath, wheezing and stridor.    Cardiovascular: Negative for chest pain, palpitations and leg swelling.   Gastrointestinal: Negative for abdominal distention, abdominal pain, anal bleeding, blood in stool, constipation, diarrhea, dysphagia, nausea, rectal pain, vomiting and indigestion.        + heartburn   Endocrine: Negative for cold intolerance, heat intolerance, polydipsia, polyphagia and polyuria.   Genitourinary: Negative for breast discharge, breast lump, breast pain, difficulty urinating, dysuria, flank pain, frequency, genital sores, hematuria, pelvic pain, urgency, urinary incontinence, vaginal bleeding, vaginal discharge and vaginal pain.   Musculoskeletal: Positive for arthralgias and myalgias. Negative for back pain, gait problem, joint swelling, neck pain and neck stiffness.        Pain around right mastectomy site   Skin: Negative for color change, rash and skin lesions.   Neurological: Negative for dizziness, tremors, seizures,  "syncope, speech difficulty, weakness, light-headedness, numbness, headache, memory problem and confusion.   Hematological: Negative for adenopathy. Does not bruise/bleed easily.   Psychiatric/Behavioral: Negative for self-injury, sleep disturbance, suicidal ideas, depressed mood and stress. The patient is not nervous/anxious.          Objective     Vitals:    07/01/20 1008   BP: 122/78   BP Location: Left arm   Patient Position: Sitting   Cuff Size: Adult   Pulse: 85   Resp: 16   Temp: 98.2 °F (36.8 °C)   TempSrc: Temporal   SpO2: 96%   Weight: 68 kg (150 lb)   Height: 161.3 cm (63.5\")         07/01/20  1008   Weight: 68 kg (150 lb)     Body mass index is 26.15 kg/m².    Physical Exam   Constitutional: She is oriented to person, place, and time. She appears well-developed and well-nourished. She is active. No distress.   HENT:   Head: Normocephalic and atraumatic.   Right Ear: Tympanic membrane, external ear and ear canal normal. Tympanic membrane is not injected, not erythematous, not retracted and not bulging.   Left Ear: Tympanic membrane, external ear and ear canal normal. Tympanic membrane is not injected, not erythematous, not retracted and not bulging.   Nose: Nose normal. No mucosal edema or rhinorrhea. Right sinus exhibits no maxillary sinus tenderness and no frontal sinus tenderness. Left sinus exhibits no maxillary sinus tenderness and no frontal sinus tenderness.   Mouth/Throat: Uvula is midline, oropharynx is clear and moist and mucous membranes are normal. No oral lesions. No oropharyngeal exudate, posterior oropharyngeal edema or posterior oropharyngeal erythema.   Eyes: Pupils are equal, round, and reactive to light. Conjunctivae, EOM and lids are normal. Right eye exhibits no discharge. Left eye exhibits no discharge.   Neck: Normal range of motion. Neck supple. No JVD present. Carotid bruit is not present. No tracheal deviation present. No thyroid mass and no thyromegaly present.   Cardiovascular: " Normal rate, regular rhythm, normal heart sounds and intact distal pulses. Exam reveals no gallop and no friction rub.   No murmur heard.  Pulmonary/Chest: Effort normal and breath sounds normal. No respiratory distress. She has no wheezes. She has no rales.   Patient declined breast examination.   Abdominal: Soft. Bowel sounds are normal. She exhibits no distension and no mass. There is no hepatosplenomegaly. There is no tenderness. No hernia.   Genitourinary:   Genitourinary Comments: Patient declined examination   Musculoskeletal: Normal range of motion. She exhibits no edema or deformity.   Lymphadenopathy:     She has no cervical adenopathy.     She has no axillary adenopathy.   Neurological: She is alert and oriented to person, place, and time. She has normal strength and normal reflexes. No cranial nerve deficit or sensory deficit. Gait normal.   Skin: Skin is warm, dry and intact. No lesion and no rash noted.   Psychiatric: She has a normal mood and affect. Her speech is normal and behavior is normal. Judgment and thought content normal. Cognition and memory are normal.   Vitals reviewed.      Office Visit on 07/01/2020   Component Date Value Ref Range Status   • Color 07/01/2020 Yellow  Yellow, Straw, Dark Yellow, Kendra Final   • Clarity, UA 07/01/2020 Clear  Clear Final   • Specific Gravity  07/01/2020 1.025  1.005 - 1.030 Final   • pH, Urine 07/01/2020 5.5  5.0 - 8.0 Final   • Leukocytes 07/01/2020 Negative  Negative Final   • Nitrite, UA 07/01/2020 Negative  Negative Final   • Protein, POC 07/01/2020 Negative  Negative mg/dL Final   • Glucose, UA 07/01/2020 Negative  Negative, 1000 mg/dL (3+) mg/dL Final   • Ketones, UA 07/01/2020 Negative  Negative Final   • Urobilinogen, UA 07/01/2020 Normal  Normal Final   • Bilirubin 07/01/2020 Negative  Negative Final   • Blood, UA 07/01/2020 Negative  Negative Final   Telephone on 06/15/2020   Component Date Value Ref Range Status   • Glucose 06/25/2020 110* 65 -  99 mg/dL Final   • BUN 06/25/2020 14  8 - 27 mg/dL Final   • Creatinine 06/25/2020 0.94  0.57 - 1.00 mg/dL Final   • eGFR Non African Am 06/25/2020 61  >59 mL/min/1.73 Final   • eGFR African Am 06/25/2020 71  >59 mL/min/1.73 Final   • BUN/Creatinine Ratio 06/25/2020 15  12 - 28 Final   • Sodium 06/25/2020 143  134 - 144 mmol/L Final   • Potassium 06/25/2020 5.0  3.5 - 5.2 mmol/L Final   • Chloride 06/25/2020 106  96 - 106 mmol/L Final   • Total CO2 06/25/2020 26  20 - 29 mmol/L Final   • Calcium 06/25/2020 9.7  8.7 - 10.3 mg/dL Final   • Total Protein 06/25/2020 7.2  6.0 - 8.5 g/dL Final   • Albumin 06/25/2020 4.4  3.7 - 4.7 g/dL Final   • Globulin 06/25/2020 2.8  1.5 - 4.5 g/dL Final   • A/G Ratio 06/25/2020 1.6  1.2 - 2.2 Final   • Total Bilirubin 06/25/2020 0.5  0.0 - 1.2 mg/dL Final   • Alkaline Phosphatase 06/25/2020 45  39 - 117 IU/L Final   • AST (SGOT) 06/25/2020 29  0 - 40 IU/L Final   • ALT (SGPT) 06/25/2020 31  0 - 32 IU/L Final   • Total Cholesterol 06/25/2020 167  100 - 199 mg/dL Final   • Triglycerides 06/25/2020 179* 0 - 149 mg/dL Final   • HDL Cholesterol 06/25/2020 40  >39 mg/dL Final   • VLDL Cholesterol 06/25/2020 36  5 - 40 mg/dL Final   • LDL Cholesterol  06/25/2020 91  0 - 99 mg/dL Final   • WBC 06/25/2020 4.3  3.4 - 10.8 x10E3/uL Final   • RBC 06/25/2020 3.89  3.77 - 5.28 x10E6/uL Final   • Hemoglobin 06/25/2020 12.5  11.1 - 15.9 g/dL Final   • Hematocrit 06/25/2020 38.4  34.0 - 46.6 % Final   • MCV 06/25/2020 99* 79 - 97 fL Final   • MCH 06/25/2020 32.1  26.6 - 33.0 pg Final   • MCHC 06/25/2020 32.6  31.5 - 35.7 g/dL Final   • RDW 06/25/2020 12.8  11.7 - 15.4 % Final   • Platelets 06/25/2020 256  150 - 450 x10E3/uL Final   • Neutrophil Rel % 06/25/2020 46  Not Estab. % Final   • Lymphocyte Rel % 06/25/2020 33  Not Estab. % Final   • Monocyte Rel % 06/25/2020 15  Not Estab. % Final   • Eosinophil Rel % 06/25/2020 5  Not Estab. % Final   • Basophil Rel % 06/25/2020 1  Not Estab. % Final   •  Neutrophils Absolute 06/25/2020 2.0  1.4 - 7.0 x10E3/uL Final   • Lymphocytes Absolute 06/25/2020 1.4  0.7 - 3.1 x10E3/uL Final   • Monocytes Absolute 06/25/2020 0.7  0.1 - 0.9 x10E3/uL Final   • Eosinophils Absolute 06/25/2020 0.2  0.0 - 0.4 x10E3/uL Final   • Basophils Absolute 06/25/2020 0.0  0.0 - 0.2 x10E3/uL Final   • Immature Granulocyte Rel % 06/25/2020 0  Not Estab. % Final   • Immature Grans Absolute 06/25/2020 0.0  0.0 - 0.1 x10E3/uL Final   • 1,25-Dihydroxy, Vitamin D 06/25/2020 76.8  19.9 - 79.3 pg/mL Final   • TSH 06/25/2020 2.140  0.450 - 4.500 uIU/mL Final   • Hemoglobin A1C 06/25/2020 5.7* 4.8 - 5.6 % Final    Comment:          Prediabetes: 5.7 - 6.4           Diabetes: >6.4           Glycemic control for adults with diabetes: <7.0     ]    Assessment/Plan   Patient Self-Management and Personalized Health Advice  The patient has been provided with information about: diet and exercise and preventive services including:   · Annual Wellness Visit (AWV)  · Bone Density Measurements  · Colorectal Cancer Screening, Cologuard Test   · Influenza Vaccine and Administration  · Screening Mammography .    Discussed the patient's BMI with her. The BMI is in the acceptable range.    Diagnoses and all orders for this visit:    1. Encounter for general adult medical examination with abnormal findings (Primary)  Comments:  Discussed age-appropriate health maintenance.  Recommended Td, Shingrix, hepatitis A vaccines.    2. Essential hypertension  Assessment & Plan:  Stable.  Continue current treatment plan.    Orders:  -     POC Urinalysis Dipstick, Automated    3. Mixed hyperlipidemia  Assessment & Plan:  Mild elevation of triglycerides, however normal LDL, VLDL, and HDL levels.  She will increase physical activity and work on a healthier diet.      4. Seasonal allergic rhinitis due to pollen  Assessment & Plan:  Mild increase in symptoms recently.  Add on Zyrtec.  Continue Singulair.  Return if not improving over  "the next few weeks.      5. Heartburn  Comments:  Discussed GERD diet.  Stop NSAID use.  Recommended PPI, however she would like to wait to see if symptoms resolved with these other measures first.      6. Prediabetes  Assessment & Plan:  A1c remains at 5.7.  She has been eating more sweets and carbs lately.  She says she will cut back on this.        7. Primary osteoarthritis of both hands  Assessment & Plan:  Recommended that she use Tylenol as needed instead of ibuprofen given her recent issues with heartburn.      8. Osteopenia of multiple sites  Assessment & Plan:  Will call with DEXA results.  Recommended weightbearing exercise and increased calcium intake.  She reports that she is taking some sort of \" calcium injection\" intravenously every 6 months at her oncologist office, however she cannot remember the name of the medication she gets.  She will find out and let me know.      9. Primary insomnia  Assessment & Plan:  Controlled with OTC sleep aid      10. Mixed anxiety depressive disorder  Assessment & Plan:  Doing well on current medication.  She reports that sometimes she only takes half a dose and does well with this.      11. Stage II lobular carcinoma of breast, ER+, right (CMS/HCC)  Assessment & Plan:  Continue follow-up with oncology and radiation oncologist.      12. Screening for colon cancer  Comments:  Negative Cologuard testing in July 2017.  She would like to repeat Cologuard testing for now.  Orders:  -     Cologuard - Stool, Per Rectum; Future    13. Breast cancer screening by mammogram  Comments:  Left breast screening mammogram scheduled for tomorrow.    Other orders  -     cetirizine (zyrTEC) 10 MG tablet; Take 1 tablet by mouth Daily.  Dispense: 90 tablet; Refill: 3  -     atorvastatin (LIPITOR) 10 MG tablet; Take 1 tablet by mouth every night at bedtime.  Dispense: 90 tablet; Refill: 3  -     citalopram (CeleXA) 20 MG tablet; Take 1 tablet by mouth Daily.  Dispense: 90 tablet; Refill: " 3  -     losartan (COZAAR) 100 MG tablet; Take 1 tablet by mouth Daily.  Dispense: 90 tablet; Refill: 3    Orders:  Orders Placed This Encounter   Procedures   • Cologuard - Stool, Per Rectum   • POC Urinalysis Dipstick, Automated       Follow Up:  No follow-ups on file.     An After Visit Summary and PPPS with all of these plans were given to the patient.

## 2020-07-01 NOTE — ASSESSMENT & PLAN NOTE
Recommended that she use Tylenol as needed instead of ibuprofen given her recent issues with heartburn.

## 2020-07-01 NOTE — ASSESSMENT & PLAN NOTE
Mild elevation of triglycerides, however normal LDL, VLDL, and HDL levels.  She will increase physical activity and work on a healthier diet.

## 2020-07-01 NOTE — ASSESSMENT & PLAN NOTE
"Will call with DEXA results.  Recommended weightbearing exercise and increased calcium intake.  She reports that she is taking some sort of \" calcium injection\" intravenously every 6 months at her oncologist office, however she cannot remember the name of the medication she gets.  She will find out and let me know.  "

## 2020-07-01 NOTE — ASSESSMENT & PLAN NOTE
Mild increase in symptoms recently.  Add on Zyrtec.  Continue Singulair.  Return if not improving over the next few weeks.

## 2020-07-01 NOTE — PATIENT INSTRUCTIONS
Food Choices for Gastroesophageal Reflux Disease, Adult  When you have gastroesophageal reflux disease (GERD), the foods you eat and your eating habits are very important. Choosing the right foods can help ease the discomfort of GERD. Consider working with a diet and nutrition specialist (dietitian) to help you make healthy food choices.  What general guidelines should I follow?    Eating plan  · Choose healthy foods low in fat, such as fruits, vegetables, whole grains, low-fat dairy products, and lean meat, fish, and poultry.  · Eat frequent, small meals instead of three large meals each day. Eat your meals slowly, in a relaxed setting. Avoid bending over or lying down until 2-3 hours after eating.  · Limit high-fat foods such as fatty meats or fried foods.  · Limit your intake of oils, butter, and shortening to less than 8 teaspoons each day.  · Avoid the following:  ? Foods that cause symptoms. These may be different for different people. Keep a food diary to keep track of foods that cause symptoms.  ? Alcohol.  ? Drinking large amounts of liquid with meals.  ? Eating meals during the 2-3 hours before bed.  · Cook foods using methods other than frying. This may include baking, grilling, or broiling.  Lifestyle  · Maintain a healthy weight. Ask your health care provider what weight is healthy for you. If you need to lose weight, work with your health care provider to do so safely.  · Exercise for at least 30 minutes on 5 or more days each week, or as told by your health care provider.  · Avoid wearing clothes that fit tightly around your waist and chest.  · Do not use any products that contain nicotine or tobacco, such as cigarettes and e-cigarettes. If you need help quitting, ask your health care provider.  · Sleep with the head of your bed raised. Use a wedge under the mattress or blocks under the bed frame to raise the head of the bed.  What foods are not recommended?  The items listed may not be a complete  list. Talk with your dietitian about what dietary choices are best for you.  Grains  Pastries or quick breads with added fat. French toast.  Vegetables  Deep fried vegetables. French fries. Any vegetables prepared with added fat. Any vegetables that cause symptoms. For some people this may include tomatoes and tomato products, chili peppers, onions and garlic, and horseradish.  Fruits  Any fruits prepared with added fat. Any fruits that cause symptoms. For some people this may include citrus fruits, such as oranges, grapefruit, pineapple, and fay.  Meats and other protein foods  High-fat meats, such as fatty beef or pork, hot dogs, ribs, ham, sausage, salami and rosenthal. Fried meat or protein, including fried fish and fried chicken. Nuts and nut butters.  Dairy  Whole milk and chocolate milk. Sour cream. Cream. Ice cream. Cream cheese. Milk shakes.  Beverages  Coffee and tea, with or without caffeine. Carbonated beverages. Sodas. Energy drinks. Fruit juice made with acidic fruits (such as orange or grapefruit). Tomato juice. Alcoholic drinks.  Fats and oils  Butter. Margarine. Shortening. Ghee.  Sweets and desserts  Chocolate and cocoa. Donuts.  Seasoning and other foods  Pepper. Peppermint and spearmint. Any condiments, herbs, or seasonings that cause symptoms. For some people, this may include talbert, hot sauce, or vinegar-based salad dressings.  Summary  · When you have gastroesophageal reflux disease (GERD), food and lifestyle choices are very important to help ease the discomfort of GERD.  · Eat frequent, small meals instead of three large meals each day. Eat your meals slowly, in a relaxed setting. Avoid bending over or lying down until 2-3 hours after eating.  · Limit high-fat foods such as fatty meat or fried foods.  This information is not intended to replace advice given to you by your health care provider. Make sure you discuss any questions you have with your health care provider.  Document Released:  12/18/2006 Document Revised: 04/09/2020 Document Reviewed: 12/19/2017  Elsevier Patient Education © 2020 [employer]

## 2020-07-01 NOTE — ASSESSMENT & PLAN NOTE
A1c remains at 5.7.  She has been eating more sweets and carbs lately.  She says she will cut back on this.

## 2020-07-02 ENCOUNTER — HOSPITAL ENCOUNTER (OUTPATIENT)
Dept: BONE DENSITY | Facility: HOSPITAL | Age: 70
Discharge: HOME | End: 2020-07-02

## 2020-07-02 ENCOUNTER — HOSPITAL ENCOUNTER (OUTPATIENT)
Dept: MAMMOGRAPHY | Facility: HOSPITAL | Age: 70
Discharge: HOME | End: 2020-07-02
Admitting: NURSE PRACTITIONER

## 2020-07-02 DIAGNOSIS — Z12.31 SCREENING MAMMOGRAM, ENCOUNTER FOR: ICD-10-CM

## 2020-07-02 PROCEDURE — 77080 DXA BONE DENSITY AXIAL: CPT

## 2020-07-02 PROCEDURE — 77063 BREAST TOMOSYNTHESIS BI: CPT

## 2020-07-02 PROCEDURE — 77067 SCR MAMMO BI INCL CAD: CPT

## 2020-07-06 ENCOUNTER — RESULTS ENCOUNTER (OUTPATIENT)
Dept: FAMILY MEDICINE CLINIC | Facility: CLINIC | Age: 70
End: 2020-07-06

## 2020-07-06 DIAGNOSIS — Z12.11 SCREENING FOR COLON CANCER: ICD-10-CM

## 2020-08-20 ENCOUNTER — TELEPHONE (OUTPATIENT)
Dept: RADIATION ONCOLOGY | Facility: HOSPITAL | Age: 70
End: 2020-08-20

## 2020-08-20 NOTE — TELEPHONE ENCOUNTER
Elizabeth states she feels great and has concerns with Covid 19. She does not want appt at this time. Told her we will keep in touch to check on her status.

## 2020-12-28 RX ORDER — MONTELUKAST SODIUM 10 MG/1
10 TABLET ORAL
Qty: 90 TABLET | Refills: 2 | Status: SHIPPED | OUTPATIENT
Start: 2020-12-28 | End: 2021-11-16 | Stop reason: SDUPTHER

## 2021-02-15 ENCOUNTER — TELEPHONE (OUTPATIENT)
Dept: FAMILY MEDICINE CLINIC | Facility: CLINIC | Age: 71
End: 2021-02-15

## 2021-02-15 NOTE — TELEPHONE ENCOUNTER
Caller: Elizabeth Momin    Relationship: Self    Best call back number: [phone]    What medication are you requesting: AMBIEN    What are your current symptoms: CANT SLEEP OR STAY ASLEEP  How long have you been experiencing symptoms: MONTHS     Have you had these symptoms before:    [x] Yes  [] No    Have you been treated for these symptoms before:   [x] Yes  [] No    If a prescription is needed, what is your preferred pharmacy and phone number:    [id number] - [address] SEC OF [facility] [address] [facility] [address] - [phone]  - [phone]   [phone]     Additional notes:  PATIENT HAS NOT BEEN SLEEPING. PATIENT WOULD LIKE TO  TODAY.

## 2021-02-15 NOTE — TELEPHONE ENCOUNTER
She needs to schedule an appointment to discuss treatment options for insomnia. I do not generally prescribe Ambien, but there are other things we can discuss.

## 2021-03-16 ENCOUNTER — TELEPHONE (OUTPATIENT)
Dept: FAMILY MEDICINE CLINIC | Facility: CLINIC | Age: 71
End: 2021-03-16

## 2021-03-16 NOTE — TELEPHONE ENCOUNTER
PATIENT CALLED AND HUB TRIED TO FIND AN APPOINTMENT FOR HER , BUT NO VIDEO / OFFICE VISITS UNTIL 4/9 .  PATIENT REALLY NEEDS TO SPEAK WITH ANKIT BA REGARDING SINUS DRAINAGE AND SLEEP ISSUES.    PLEASE ADVISE: [phone]

## 2021-03-17 ENCOUNTER — OFFICE VISIT (OUTPATIENT)
Dept: FAMILY MEDICINE CLINIC | Facility: CLINIC | Age: 71
End: 2021-03-17

## 2021-03-17 VITALS
BODY MASS INDEX: 26.12 KG/M2 | HEART RATE: 96 BPM | OXYGEN SATURATION: 98 % | DIASTOLIC BLOOD PRESSURE: 75 MMHG | RESPIRATION RATE: 16 BRPM | WEIGHT: 153 LBS | SYSTOLIC BLOOD PRESSURE: 121 MMHG | HEIGHT: 64 IN | TEMPERATURE: 97.4 F

## 2021-03-17 DIAGNOSIS — R73.03 PREDIABETES: Primary | ICD-10-CM

## 2021-03-17 DIAGNOSIS — J30.9 ALLERGIC RHINITIS, UNSPECIFIED SEASONALITY, UNSPECIFIED TRIGGER: ICD-10-CM

## 2021-03-17 DIAGNOSIS — F51.01 PRIMARY INSOMNIA: ICD-10-CM

## 2021-03-17 LAB — HBA1C MFR BLD: 5.7 %

## 2021-03-17 PROCEDURE — 83036 HEMOGLOBIN GLYCOSYLATED A1C: CPT | Performed by: NURSE PRACTITIONER

## 2021-03-17 PROCEDURE — 99214 OFFICE O/P EST MOD 30 MIN: CPT | Performed by: NURSE PRACTITIONER

## 2021-03-17 RX ORDER — FLUTICASONE PROPIONATE 50 MCG
2 SPRAY, SUSPENSION (ML) NASAL DAILY
Qty: 48 G | Refills: 3 | Status: SHIPPED | OUTPATIENT
Start: 2021-03-17 | End: 2021-11-16 | Stop reason: SDUPTHER

## 2021-03-17 RX ORDER — AMITRIPTYLINE HYDROCHLORIDE 10 MG/1
10 TABLET, FILM COATED ORAL NIGHTLY
Qty: 30 TABLET | Refills: 1 | Status: SHIPPED | OUTPATIENT
Start: 2021-03-17 | End: 2021-04-20 | Stop reason: DRUGHIGH

## 2021-03-17 NOTE — ASSESSMENT & PLAN NOTE
Stable.  Encouraged her to continue healthy eating and staying physically active.  Recheck in 3 to 6 months.

## 2021-03-17 NOTE — PROGRESS NOTES
Chief Complaint  Insomnia and Allergies    Subjective          History of Present Illness  Insomnia  This is a chronic problem.  She has trouble falling asleep, but when she falls asleep she can stay asleep till the morning.  She has trouble relaxing, feels like she cannot turn her brain off.  She has tried multiple over-the-counter products including passion flower, Benadryl, melatonin, and ibuprofen.  None of these have been effective.  She limits caffeine intake.    Allergies  This is a chronic problem.  She continues to have excessive postnasal drainage.  This is worse at night.  This also affects her sleep.  She has been taking Zyrtec and Singulair consistently without improvement in symptoms.  She saw an allergist in the past and started immunotherapy, however she had recurrent reactions to her shots so it was discontinued.    Prediabetes  She has been trying to eat healthy and stays physically active.      Current Outpatient Medications:   •  acetaminophen (TYLENOL) 650 MG 8 hr tablet, Take 650 mg by mouth., Disp: , Rfl:   •  Biotin 10 MG tablet, Take 1 tablet by mouth Daily., Disp: , Rfl:   •  Calcium Carb-Cholecalciferol (CALCIUM-VITAMIN D) 600-400 MG-UNIT tablet, Take 1 tablet by mouth., Disp: , Rfl:   •  cetirizine (zyrTEC) 10 MG tablet, Take 1 tablet by mouth Daily., Disp: 90 tablet, Rfl: 3  •  folic acid (FOLVITE) 400 MCG tablet, Take 1 tablet by mouth Daily., Disp: , Rfl:   •  ibuprofen (ADVIL,MOTRIN) 800 MG tablet, Take 800 mg by mouth Every 6 (Six) Hours As Needed., Disp: , Rfl:   •  letrozole (FEMARA) 2.5 MG tablet, Take 2.5 mg by mouth Daily., Disp: , Rfl:   •  losartan (COZAAR) 100 MG tablet, Take 1 tablet by mouth Daily., Disp: 90 tablet, Rfl: 3  •  montelukast (SINGULAIR) 10 MG tablet, Take 1 tablet by mouth every night at bedtime., Disp: 90 tablet, Rfl: 2  •  Multiple Vitamin (MULTI-VITAMIN) tablet, Take 1 tablet by mouth Daily., Disp: , Rfl:   •  amitriptyline (ELAVIL) 10 MG tablet, Take 1  "tablet by mouth Every Night., Disp: 30 tablet, Rfl: 1  •  fluticasone (Flonase) 50 MCG/ACT nasal spray, 2 sprays into the nostril(s) as directed by provider Daily., Disp: 48 g, Rfl: 3     Review of Systems   Constitutional: Positive for fatigue. Negative for activity change, appetite change, chills, diaphoresis and fever.   HENT: Positive for congestion, postnasal drip and sinus pressure. Negative for ear discharge, ear pain, facial swelling, hearing loss, mouth sores, nosebleeds, rhinorrhea, sneezing, sore throat, swollen glands, trouble swallowing and voice change.    Eyes: Negative for discharge, redness and itching.   Respiratory: Negative for cough, chest tightness, shortness of breath and wheezing.    Cardiovascular: Negative for chest pain and palpitations.   Gastrointestinal: Negative for abdominal pain, diarrhea, nausea and vomiting.   Musculoskeletal: Negative for myalgias, neck pain and neck stiffness.   Skin: Negative for rash.   Allergic/Immunologic: Positive for environmental allergies.   Neurological: Negative for dizziness and headache.   Hematological: Negative for adenopathy.      Objective   Vital Signs:   Vitals:    03/17/21 1246   BP: 121/75   BP Location: Left arm   Patient Position: Sitting   Cuff Size: Adult   Pulse: 96   Resp: 16   Temp: 97.4 °F (36.3 °C)   TempSrc: Infrared   SpO2: 98%   Weight: 69.4 kg (153 lb)   Height: 161.3 cm (63.5\")     Body mass index is 26.68 kg/m².      PHQ-2 Depression Screening  Little interest or pleasure in doing things? 0   Feeling down, depressed, or hopeless? 0   PHQ-2 Total Score 0       Physical Exam  Constitutional:       General: She is not in acute distress.     Appearance: She is well-developed. She is not diaphoretic.   HENT:      Head: Normocephalic and atraumatic.      Right Ear: Ear canal and external ear normal. No drainage. Tympanic membrane is not injected, erythematous or retracted.      Left Ear: Ear canal and external ear normal. No drainage. " Tympanic membrane is not injected, erythematous or retracted.      Nose: Nose normal. No mucosal edema or rhinorrhea.      Right Sinus: No maxillary sinus tenderness or frontal sinus tenderness.      Left Sinus: No maxillary sinus tenderness or frontal sinus tenderness.      Mouth/Throat:      Mouth: No oral lesions.      Pharynx: Uvula midline. No oropharyngeal exudate or posterior oropharyngeal erythema.   Eyes:      General: Lids are normal.         Right eye: No discharge.         Left eye: No discharge.      Conjunctiva/sclera: Conjunctivae normal.      Pupils: Pupils are equal, round, and reactive to light.   Neck:      Thyroid: No thyromegaly.      Vascular: No carotid bruit.      Trachea: No tracheal deviation.   Cardiovascular:      Rate and Rhythm: Normal rate and regular rhythm.      Heart sounds: Normal heart sounds. No murmur heard.   No friction rub. No gallop.    Pulmonary:      Effort: Pulmonary effort is normal. No respiratory distress.      Breath sounds: Normal breath sounds. No wheezing or rales.   Abdominal:      General: Bowel sounds are normal.      Palpations: Abdomen is soft.      Tenderness: There is no abdominal tenderness.      Hernia: No hernia is present.   Musculoskeletal:         General: No deformity. Normal range of motion.      Cervical back: Normal range of motion and neck supple.   Lymphadenopathy:      Cervical: No cervical adenopathy.   Skin:     General: Skin is warm and dry.      Findings: No lesion or rash.   Neurological:      Mental Status: She is alert and oriented to person, place, and time.   Psychiatric:         Behavior: Behavior normal.          Result Review :   The following data was reviewed by: LEO Longo on 03/17/2021:  CMP    CMP 5/7/20 6/25/20 11/12/20   Glucose 101 (A) 110 (A) 112 (A)   BUN 17 14 15   Creatinine 0.8 0.94 0.80   eGFR Non  Am  61    eGFR  Am  71    Sodium 141 143 140   Potassium 4.1 5.0 3.9   Chloride 106 106 104    Calcium 10.0 9.7 9.8   Total Protein  7.2    Albumin 4.4 4.4 4.4   Globulin  2.8    Total Bilirubin 0.6 0.5 1.0   Alkaline Phosphatase 31 (A) 45 36 (A)   AST (SGOT) 37 29 42   ALT (SGPT) 42 (A) 31 33   (A) Abnormal value       Comments are available for some flowsheets but are not being displayed.           CBC    CBC 5/7/20 6/25/20 11/12/20   WBC 4.60 4.3 5.72   RBC 3.98 (A) 3.89 4.03   Hemoglobin 12.8 12.5 13.1   Hematocrit 38.8 38.4 39.0   MCV 97.5 99 (A) 96.8   MCH 32.2 32.1 32.5   MCHC 33.0 32.6 33.6   RDW 12.8 12.8 13.2   Platelets 245 256 250   (A) Abnormal value            Lipid Panel    Lipid Panel 6/25/20   Total Cholesterol 167   Triglycerides 179 (A)   HDL Cholesterol 40   VLDL Cholesterol 36   LDL Cholesterol  91   (A) Abnormal value            Lab Results   Component Value Date    HGBA1C 5.7 03/17/2021     TSH    TSH 6/25/20   TSH 2.140                     Assessment and Plan    Diagnoses and all orders for this visit:    1. Prediabetes (Primary)  Assessment & Plan:  Stable.  Encouraged her to continue healthy eating and staying physically active.  Recheck in 3 to 6 months.    Orders:  -     POC Glycosylated Hemoglobin (Hb A1C)    2. Allergic rhinitis, unspecified seasonality, unspecified trigger  Assessment & Plan:  Add on nasal steroid spray.  Continue Zyrtec and Singulair.  If not improving over the next month we will have her return to allergist for management.      3. Primary insomnia  Assessment & Plan:  Discussed sleep hygiene.  Trial of amitriptyline.  Follow-up in 1 month, sooner for any problems.      Other orders  -     fluticasone (Flonase) 50 MCG/ACT nasal spray; 2 sprays into the nostril(s) as directed by provider Daily.  Dispense: 48 g; Refill: 3  -     amitriptyline (ELAVIL) 10 MG tablet; Take 1 tablet by mouth Every Night.  Dispense: 30 tablet; Refill: 1          Follow Up   Return in about 1 month (around 4/17/2021) for Follow-Up insomnia and allergies.    Patient was given  instructions and counseling regarding her condition and health maintenance advice. Please see specific information in the After Visit Summary.     Dennise Wiley, APRN 3/17/2021 13:17 EDT  This note was electronically signed.

## 2021-03-17 NOTE — ASSESSMENT & PLAN NOTE
Add on nasal steroid spray.  Continue Zyrtec and Singulair.  If not improving over the next month we will have her return to allergist for management.

## 2021-04-20 RX ORDER — AMITRIPTYLINE HYDROCHLORIDE 25 MG/1
25 TABLET, FILM COATED ORAL NIGHTLY
Qty: 90 TABLET | Refills: 1 | Status: SHIPPED | OUTPATIENT
Start: 2021-04-20 | End: 2021-11-16

## 2021-04-20 RX ORDER — AMITRIPTYLINE HYDROCHLORIDE 10 MG/1
10 TABLET, FILM COATED ORAL NIGHTLY
Qty: 30 TABLET | Refills: 1 | Status: CANCELLED | OUTPATIENT
Start: 2021-04-20

## 2021-04-20 NOTE — TELEPHONE ENCOUNTER
Caller: Elizabeth Momin    Relationship: Self    Best call back number:[phone]    Medication needed:   Requested Prescriptions     Pending Prescriptions Disp Refills   • amitriptyline (ELAVIL) 10 MG tablet 30 tablet 1     Sig: Take 1 tablet by mouth Every Night.       When do you need the refill by: ASAP    What additional details did the patient provide when requesting the medication: PATIENT HAS ONE PILL LEFT. PATIENT IS REQUESTING TO INCREASE THE DOSAGE. PATIENT STATED THIS WAS DISCUSSED PREVIOUSLY. PATIENT CANCELED APPOINTMENT-SHE IS FEELING FINE. JUST WANTS AN INCREASE IN DOSAGE.     Does the patient have less than a 3 day supply:  [x] Yes  [] No    What is the patient's preferred pharmacy: [facility][id number] - [address] [facility] [address] [facility] [address] [phone] [facility] [phone] FX

## 2021-06-14 ENCOUNTER — APPOINTMENT (OUTPATIENT)
Dept: MAMMOGRAPHY | Facility: HOSPITAL | Age: 71
End: 2021-06-14

## 2021-07-29 RX ORDER — CETIRIZINE HYDROCHLORIDE 10 MG/1
10 TABLET ORAL DAILY
Qty: 90 TABLET | Refills: 1 | Status: SHIPPED | OUTPATIENT
Start: 2021-07-29 | End: 2021-11-16 | Stop reason: SDUPTHER

## 2021-09-27 ENCOUNTER — TELEPHONE (OUTPATIENT)
Dept: FAMILY MEDICINE CLINIC | Facility: CLINIC | Age: 71
End: 2021-09-27

## 2021-09-27 DIAGNOSIS — Z12.31 BREAST CANCER SCREENING BY MAMMOGRAM: ICD-10-CM

## 2021-09-27 DIAGNOSIS — I10 ESSENTIAL HYPERTENSION: ICD-10-CM

## 2021-09-27 DIAGNOSIS — E78.2 MIXED HYPERLIPIDEMIA: Primary | ICD-10-CM

## 2021-09-27 DIAGNOSIS — R73.03 PREDIABETES: ICD-10-CM

## 2021-09-27 RX ORDER — LOSARTAN POTASSIUM 100 MG/1
100 TABLET ORAL DAILY
Qty: 90 TABLET | Refills: 0 | Status: SHIPPED | OUTPATIENT
Start: 2021-09-27 | End: 2021-09-28 | Stop reason: SDUPTHER

## 2021-09-27 NOTE — TELEPHONE ENCOUNTER
Caller: Elizabeth Momin    Relationship: Self    Best call back number: [phone]     What orders are you requesting (i.e. lab or imaging): LABS, MAMMOGRAM    In what timeframe would the patient need to come in: BEFORE 11/16, PATIENT IS SCHEDULED FOR HER ANNUAL WELLNESS VISIT    Where will you receive your lab/imaging services: IN OFFICE, [facility]    Additional notes: PATIENT WOULD LIKE TO SCHEDULE HER LABS BEFORE HER ANNUAL WELLNESS VISIT. SHE WOULD ALSO LIKE TO HAVE HER MAMMOGRAM DONE BEFORE HER VISIT AS WELL, SO SHE CAN DISCUSS EVERYTHING WITH ANKIT BA

## 2021-09-28 RX ORDER — LOSARTAN POTASSIUM 100 MG/1
100 TABLET ORAL DAILY
Qty: 90 TABLET | Refills: 0 | Status: SHIPPED | OUTPATIENT
Start: 2021-09-28 | End: 2021-11-16 | Stop reason: SDUPTHER

## 2021-10-13 ENCOUNTER — HOSPITAL ENCOUNTER (OUTPATIENT)
Dept: MAMMOGRAPHY | Facility: HOSPITAL | Age: 71
Discharge: HOME | End: 2021-10-13
Admitting: NURSE PRACTITIONER

## 2021-10-13 DIAGNOSIS — Z12.31 BREAST CANCER SCREENING BY MAMMOGRAM: ICD-10-CM

## 2021-10-13 PROCEDURE — 77063 BREAST TOMOSYNTHESIS BI: CPT

## 2021-10-13 PROCEDURE — 77067 SCR MAMMO BI INCL CAD: CPT

## 2021-11-09 LAB
ALBUMIN SERPL-MCNC: 4.4 G/DL (ref 3.7–4.7)
ALBUMIN/GLOB SERPL: 1.6 {RATIO} (ref 1.2–2.2)
ALP SERPL-CCNC: 42 IU/L (ref 44–121)
ALT SERPL-CCNC: 53 IU/L (ref 0–32)
AST SERPL-CCNC: 37 IU/L (ref 0–40)
BASOPHILS # BLD AUTO: 0 X10E3/UL (ref 0–0.2)
BASOPHILS NFR BLD AUTO: 1 %
BILIRUB SERPL-MCNC: 0.6 MG/DL (ref 0–1.2)
BUN SERPL-MCNC: 13 MG/DL (ref 8–27)
BUN/CREAT SERPL: 14 (ref 12–28)
CALCIUM SERPL-MCNC: 9.7 MG/DL (ref 8.7–10.3)
CHLORIDE SERPL-SCNC: 107 MMOL/L (ref 96–106)
CHOLEST SERPL-MCNC: 257 MG/DL (ref 100–199)
CO2 SERPL-SCNC: 24 MMOL/L (ref 20–29)
CREAT SERPL-MCNC: 0.95 MG/DL (ref 0.57–1)
EOSINOPHIL # BLD AUTO: 0.2 X10E3/UL (ref 0–0.4)
EOSINOPHIL NFR BLD AUTO: 3 %
ERYTHROCYTE [DISTWIDTH] IN BLOOD BY AUTOMATED COUNT: 13 % (ref 11.7–15.4)
GLOBULIN SER CALC-MCNC: 2.7 G/DL (ref 1.5–4.5)
GLUCOSE SERPL-MCNC: 103 MG/DL (ref 65–99)
HBA1C MFR BLD: 6 % (ref 4.8–5.6)
HCT VFR BLD AUTO: 38.4 % (ref 34–46.6)
HDLC SERPL-MCNC: 41 MG/DL
HGB BLD-MCNC: 12.7 G/DL (ref 11.1–15.9)
IMM GRANULOCYTES # BLD AUTO: 0 X10E3/UL (ref 0–0.1)
IMM GRANULOCYTES NFR BLD AUTO: 0 %
LDLC SERPL CALC-MCNC: 179 MG/DL (ref 0–99)
LYMPHOCYTES # BLD AUTO: 2.2 X10E3/UL (ref 0.7–3.1)
LYMPHOCYTES NFR BLD AUTO: 47 %
MCH RBC QN AUTO: 32.1 PG (ref 26.6–33)
MCHC RBC AUTO-ENTMCNC: 33.1 G/DL (ref 31.5–35.7)
MCV RBC AUTO: 97 FL (ref 79–97)
MONOCYTES # BLD AUTO: 0.5 X10E3/UL (ref 0.1–0.9)
MONOCYTES NFR BLD AUTO: 11 %
NEUTROPHILS # BLD AUTO: 1.8 X10E3/UL (ref 1.4–7)
NEUTROPHILS NFR BLD AUTO: 38 %
PLATELET # BLD AUTO: 249 X10E3/UL (ref 150–450)
POTASSIUM SERPL-SCNC: 4.1 MMOL/L (ref 3.5–5.2)
PROT SERPL-MCNC: 7.1 G/DL (ref 6–8.5)
RBC # BLD AUTO: 3.96 X10E6/UL (ref 3.77–5.28)
SODIUM SERPL-SCNC: 143 MMOL/L (ref 134–144)
TRIGL SERPL-MCNC: 199 MG/DL (ref 0–149)
VLDLC SERPL CALC-MCNC: 37 MG/DL (ref 5–40)
WBC # BLD AUTO: 4.8 X10E3/UL (ref 3.4–10.8)

## 2021-11-16 ENCOUNTER — OFFICE VISIT (OUTPATIENT)
Dept: FAMILY MEDICINE CLINIC | Facility: CLINIC | Age: 71
End: 2021-11-16

## 2021-11-16 VITALS
OXYGEN SATURATION: 97 % | HEIGHT: 64 IN | BODY MASS INDEX: 26.12 KG/M2 | WEIGHT: 153 LBS | RESPIRATION RATE: 18 BRPM | SYSTOLIC BLOOD PRESSURE: 128 MMHG | DIASTOLIC BLOOD PRESSURE: 83 MMHG | TEMPERATURE: 97.3 F | HEART RATE: 102 BPM

## 2021-11-16 DIAGNOSIS — C50.911: ICD-10-CM

## 2021-11-16 DIAGNOSIS — Z00.01 ENCOUNTER FOR GENERAL ADULT MEDICAL EXAMINATION WITH ABNORMAL FINDINGS: Primary | ICD-10-CM

## 2021-11-16 DIAGNOSIS — R74.8 ELEVATED LIVER ENZYMES: ICD-10-CM

## 2021-11-16 DIAGNOSIS — M19.042 PRIMARY OSTEOARTHRITIS OF BOTH HANDS: ICD-10-CM

## 2021-11-16 DIAGNOSIS — E78.2 MIXED HYPERLIPIDEMIA: ICD-10-CM

## 2021-11-16 DIAGNOSIS — Z23 NEED FOR VACCINATION: ICD-10-CM

## 2021-11-16 DIAGNOSIS — M85.89 OSTEOPENIA OF MULTIPLE SITES: ICD-10-CM

## 2021-11-16 DIAGNOSIS — R73.03 PREDIABETES: ICD-10-CM

## 2021-11-16 DIAGNOSIS — Z17.0: ICD-10-CM

## 2021-11-16 DIAGNOSIS — F41.8 MIXED ANXIETY DEPRESSIVE DISORDER: ICD-10-CM

## 2021-11-16 DIAGNOSIS — R82.90 ABNORMAL URINALYSIS: ICD-10-CM

## 2021-11-16 DIAGNOSIS — I10 PRIMARY HYPERTENSION: ICD-10-CM

## 2021-11-16 DIAGNOSIS — Z12.83 SKIN EXAM, SCREENING FOR CANCER: ICD-10-CM

## 2021-11-16 DIAGNOSIS — M19.041 PRIMARY OSTEOARTHRITIS OF BOTH HANDS: ICD-10-CM

## 2021-11-16 DIAGNOSIS — F51.01 PRIMARY INSOMNIA: ICD-10-CM

## 2021-11-16 DIAGNOSIS — J30.9 ALLERGIC RHINITIS, UNSPECIFIED SEASONALITY, UNSPECIFIED TRIGGER: ICD-10-CM

## 2021-11-16 LAB
BILIRUB BLD-MCNC: NEGATIVE MG/DL
CLARITY, POC: CLEAR
COLOR UR: YELLOW
EXPIRATION DATE: ABNORMAL
GLUCOSE UR STRIP-MCNC: NEGATIVE MG/DL
KETONES UR QL: NEGATIVE
LEUKOCYTE EST, POC: ABNORMAL
Lab: ABNORMAL
NITRITE UR-MCNC: POSITIVE MG/ML
PH UR: 5.5 [PH] (ref 5–8)
PROT UR STRIP-MCNC: NEGATIVE MG/DL
RBC # UR STRIP: NEGATIVE /UL
SP GR UR: 1.02 (ref 1–1.03)
UROBILINOGEN UR QL: NORMAL

## 2021-11-16 PROCEDURE — 1159F MED LIST DOCD IN RCRD: CPT | Performed by: NURSE PRACTITIONER

## 2021-11-16 PROCEDURE — G0008 ADMIN INFLUENZA VIRUS VAC: HCPCS | Performed by: NURSE PRACTITIONER

## 2021-11-16 PROCEDURE — G0439 PPPS, SUBSEQ VISIT: HCPCS | Performed by: NURSE PRACTITIONER

## 2021-11-16 PROCEDURE — 99214 OFFICE O/P EST MOD 30 MIN: CPT | Performed by: NURSE PRACTITIONER

## 2021-11-16 PROCEDURE — 90662 IIV NO PRSV INCREASED AG IM: CPT | Performed by: NURSE PRACTITIONER

## 2021-11-16 PROCEDURE — 81003 URINALYSIS AUTO W/O SCOPE: CPT | Performed by: NURSE PRACTITIONER

## 2021-11-16 PROCEDURE — 1170F FXNL STATUS ASSESSED: CPT | Performed by: NURSE PRACTITIONER

## 2021-11-16 RX ORDER — ATORVASTATIN CALCIUM 10 MG/1
10 TABLET, FILM COATED ORAL DAILY
Qty: 90 TABLET | Refills: 1 | Status: SHIPPED | OUTPATIENT
Start: 2021-11-16 | End: 2022-05-23 | Stop reason: SDUPTHER

## 2021-11-16 RX ORDER — FLUTICASONE PROPIONATE 50 MCG
2 SPRAY, SUSPENSION (ML) NASAL DAILY
Qty: 48 G | Refills: 3 | Status: SHIPPED | OUTPATIENT
Start: 2021-11-16 | End: 2022-05-26

## 2021-11-16 RX ORDER — AMITRIPTYLINE HYDROCHLORIDE 50 MG/1
50 TABLET, FILM COATED ORAL NIGHTLY
Qty: 90 TABLET | Refills: 1 | Status: SHIPPED | OUTPATIENT
Start: 2021-11-16 | End: 2022-09-21 | Stop reason: SDUPTHER

## 2021-11-16 RX ORDER — MONTELUKAST SODIUM 10 MG/1
10 TABLET ORAL
Qty: 90 TABLET | Refills: 3 | Status: SHIPPED | OUTPATIENT
Start: 2021-11-16 | End: 2023-03-07 | Stop reason: SDUPTHER

## 2021-11-16 RX ORDER — CETIRIZINE HYDROCHLORIDE 10 MG/1
10 TABLET ORAL DAILY
Qty: 90 TABLET | Refills: 3 | Status: SHIPPED | OUTPATIENT
Start: 2021-11-16 | End: 2023-03-07 | Stop reason: SDUPTHER

## 2021-11-16 RX ORDER — LOSARTAN POTASSIUM 100 MG/1
100 TABLET ORAL DAILY
Qty: 90 TABLET | Refills: 1 | Status: SHIPPED | OUTPATIENT
Start: 2021-11-16 | End: 2022-07-07

## 2021-11-16 NOTE — ASSESSMENT & PLAN NOTE
Encouraged her to use Zyrtec, Singulair and Flonase consistently.   Try taking Zyrtec at night to see if that helps with the daytime fatigue.

## 2021-11-16 NOTE — PROGRESS NOTES
The ABCs of the Annual Wellness Visit  Subsequent Medicare Wellness Visit    Chief Complaint   Patient presents with   • Medicare Wellness-subsequent   • Hyperlipidemia   • Prediabetes   • Allergies   • Hypertension      Subjective    History of Present Illness:  Elizabeth Momin is a 71 y.o. female who presents for a Subsequent Medicare Wellness Visit.  She is also following up on multiple medical conditions, including, hypertension, hyperlipidemia, prediabetes, allergic rhinitis, insomnia, anxiety, and depression. She has been off of Lipitor since December. Initially she didn't realize she had stopped taking it, but then she decided to stay off of it to see if her aches/pains improved - they did not. She is no longer taking Celexa and feels that she is doing well off of it. She still is not sleeping well. She has been taking Amitriptyline 25 mg nightly. Will also take Tylenol PM at times. Frequently feels tired during the day. She is still having postnasal drainage daily. She takes Zyrtec every morning. Doesn't always take Singulair or use the Flonase nasal spray.     She saw Dr. Stockton yesterday and had a breast examination. Mammogram is up-to-date. Received Zometa infusion yesterday.         The following portions of the patient's history were reviewed and   updated as appropriate: allergies, current medications, past family history, past medical history, past social history, past surgical history and problem list.    Compared to one year ago, the patient feels her physical   health is the same.    Compared to one year ago, the patient feels her mental   health is the same.    Recent Hospitalizations:  She was not admitted to the hospital during the last year.       Current Medical Providers:  Patient Care Team:  Dennise Wiley APRN as PCP - Eugene Spears MD as Consulting Physician (Hematology and Oncology)  Jevon López MD as Consulting Physician (Radiation Oncology)    Outpatient Medications  Prior to Visit   Medication Sig Dispense Refill   • acetaminophen (TYLENOL) 650 MG 8 hr tablet Take 650 mg by mouth.     • Biotin 10 MG tablet Take 1 tablet by mouth Daily.     • Calcium Carb-Cholecalciferol (CALCIUM-VITAMIN D) 600-400 MG-UNIT tablet Take 1 tablet by mouth.     • folic acid (FOLVITE) 400 MCG tablet Take 1 tablet by mouth Daily.     • ibuprofen (ADVIL,MOTRIN) 800 MG tablet Take 800 mg by mouth Every 6 (Six) Hours As Needed.     • letrozole (FEMARA) 2.5 MG tablet Take 2.5 mg by mouth Daily.     • Multiple Vitamin (MULTI-VITAMIN) tablet Take 1 tablet by mouth Daily.     • ZOLEDRONIC ACID IV Infuse  into a venous catheter Every 6 (Six) Months.     • amitriptyline (ELAVIL) 25 MG tablet Take 1 tablet by mouth Every Night. 90 tablet 1   • cetirizine (zyrTEC) 10 MG tablet TAKE 1 TABLET BY MOUTH DAILY 90 tablet 1   • fluticasone (Flonase) 50 MCG/ACT nasal spray 2 sprays into the nostril(s) as directed by provider Daily. 48 g 3   • losartan (COZAAR) 100 MG tablet TAKE 1 TABLET BY MOUTH DAILY 90 tablet 0   • montelukast (SINGULAIR) 10 MG tablet Take 1 tablet by mouth every night at bedtime. 90 tablet 2     No facility-administered medications prior to visit.       No opioid medication identified on active medication list. I have reviewed chart for other potential  high risk medication/s and harmful drug interactions in the elderly.          Aspirin is not on active medication list.  Aspirin use is not indicated based on review of current medical condition/s. Risk of harm outweighs potential benefits.  .    Patient Active Problem List   Diagnosis   • Allergic rhinitis   • Hyperlipidemia   • Hypertension   • Insomnia   • Mixed anxiety depressive disorder   • Osteopenia of multiple sites   • Osteoarthritis   • Temporomandibular joint disorder   • S/P right mastectomy   • Stage II lobular carcinoma of breast, ER+, right (HCC)   • Prediabetes   • Elevated liver enzymes     Advance Care Planning  Advance Directive  is not on file.  ACP discussion was held with the patient during this visit. Patient has an advance directive (not in EMR), copy requested.    Review of Systems   Constitutional: Positive for fatigue. Negative for appetite change, chills, diaphoresis, fever, unexpected weight gain and unexpected weight loss.   HENT: Positive for postnasal drip. Negative for congestion, ear discharge, ear pain, hearing loss, mouth sores, nosebleeds, rhinorrhea, sinus pressure, sneezing, sore throat, swollen glands and trouble swallowing.    Eyes: Negative for blurred vision, double vision, pain, discharge, redness and itching.   Respiratory: Negative for apnea, cough, choking, shortness of breath, wheezing and stridor.    Cardiovascular: Negative for chest pain, palpitations and leg swelling.   Gastrointestinal: Negative for abdominal distention, abdominal pain, anal bleeding, blood in stool, constipation, diarrhea, nausea, rectal pain, vomiting and indigestion.   Endocrine: Negative for cold intolerance, heat intolerance, polydipsia, polyphagia and polyuria.   Genitourinary: Negative for breast discharge, breast lump, breast pain, difficulty urinating, dysuria, flank pain, frequency, genital sores, hematuria, pelvic pain, urgency, urinary incontinence, vaginal bleeding, vaginal discharge and vaginal pain.   Musculoskeletal: Positive for arthralgias and myalgias. Negative for back pain, gait problem, joint swelling, neck pain and neck stiffness.   Skin: Negative for color change, rash and skin lesions.   Neurological: Negative for dizziness, tremors, seizures, syncope, speech difficulty, weakness, light-headedness, numbness, headache, memory problem and confusion.   Hematological: Negative for adenopathy. Does not bruise/bleed easily.   Psychiatric/Behavioral: Positive for sleep disturbance. Negative for self-injury, suicidal ideas, depressed mood and stress. The patient is not nervous/anxious.         Objective    Vitals:     "11/16/21 1345   BP: 128/83   BP Location: Left arm   Patient Position: Sitting   Cuff Size: Large Adult   Pulse: 102   Resp: 18   Temp: 97.3 °F (36.3 °C)   TempSrc: Infrared   SpO2: 97%   Weight: 69.4 kg (153 lb)   Height: 161.3 cm (63.5\")     BMI Readings from Last 1 Encounters:   11/16/21 26.67 kg/m²   BMI is above normal parameters. Recommendations include: exercise counseling and nutrition counseling    Does the patient have evidence of cognitive impairment? No    Physical Exam  Constitutional:       General: She is not in acute distress.     Appearance: She is well-developed. She is not diaphoretic.   HENT:      Head: Normocephalic and atraumatic.      Right Ear: Ear canal and external ear normal. No drainage. Tympanic membrane is not injected, erythematous or retracted.      Left Ear: Ear canal and external ear normal. No drainage. Tympanic membrane is not injected, erythematous or retracted.      Nose: Nose normal. No mucosal edema or rhinorrhea.      Right Sinus: No maxillary sinus tenderness or frontal sinus tenderness.      Left Sinus: No maxillary sinus tenderness or frontal sinus tenderness.      Mouth/Throat:      Mouth: No oral lesions.      Pharynx: Uvula midline. No oropharyngeal exudate or posterior oropharyngeal erythema.   Eyes:      General: Lids are normal.         Right eye: No discharge.         Left eye: No discharge.      Conjunctiva/sclera: Conjunctivae normal.      Pupils: Pupils are equal, round, and reactive to light.   Neck:      Thyroid: No thyromegaly.      Vascular: No carotid bruit.      Trachea: Trachea normal. No tracheal deviation.   Cardiovascular:      Rate and Rhythm: Normal rate and regular rhythm.      Heart sounds: Normal heart sounds. No murmur heard.  No friction rub. No gallop.    Pulmonary:      Effort: Pulmonary effort is normal. No respiratory distress.      Breath sounds: Normal breath sounds. No wheezing or rales.   Abdominal:      General: Bowel sounds are normal. "      Palpations: Abdomen is soft.      Tenderness: There is no abdominal tenderness.      Hernia: No hernia is present.   Musculoskeletal:         General: No deformity. Normal range of motion.      Cervical back: Normal range of motion and neck supple.   Lymphadenopathy:      Cervical: No cervical adenopathy.   Skin:     General: Skin is warm and dry.      Findings: No lesion or rash.   Neurological:      Mental Status: She is alert and oriented to person, place, and time.   Psychiatric:         Behavior: Behavior normal.         Thought Content: Thought content normal.         Judgment: Judgment normal.          Reviewed by LEO Hudson today:  11/15/21 Oncology Note    Lab Results   Component Value Date    WBC 4.88 11/15/2021    HGB 12.8 11/15/2021    HCT 39.8 11/15/2021    MCV 98.5 11/15/2021     11/15/2021     Lab Results   Component Value Date    GLUCOSE 103 (H) 11/08/2021    BUN 13 11/08/2021    CREATININE 0.95 11/08/2021    EGFRIFNONA 60 11/08/2021    EGFRIFAFRI 69 11/08/2021    BCR 14 11/08/2021    K 4.1 11/08/2021    CO2 24 11/08/2021    CALCIUM 9.7 11/08/2021    PROTENTOTREF 7.1 11/08/2021    ALBUMIN 4.4 11/08/2021    LABIL2 1.6 11/08/2021    AST 37 11/08/2021    ALT 53 (H) 11/08/2021     Lab Results   Component Value Date    CHOL 172 06/14/2019    CHLPL 257 (H) 11/08/2021    TRIG 199 (H) 11/08/2021    HDL 41 11/08/2021     (H) 11/08/2021     Lab Results   Component Value Date    TSH 2.140 06/25/2020     Lab Results   Component Value Date    HGBA1C 6.0 (H) 11/08/2021     Office Visit on 11/16/2021   Component Date Value Ref Range Status   • Color 11/16/2021 Yellow  Yellow, Straw, Dark Yellow, Kendra Final   • Clarity, UA 11/16/2021 Clear  Clear Final   • Specific Gravity  11/16/2021 1.025  1.005 - 1.030 Final   • pH, Urine 11/16/2021 5.5  5.0 - 8.0 Final   • Leukocytes 11/16/2021 Trace* Negative Final   • Nitrite, UA 11/16/2021 Positive* Negative Final   • Protein, POC 11/16/2021 Negative   Negative mg/dL Final   • Glucose, UA 11/16/2021 Negative  Negative, 1000 mg/dL (3+) mg/dL Final   • Ketones, UA 11/16/2021 Negative  Negative Final   • Urobilinogen, UA 11/16/2021 Normal  Normal Final   • Bilirubin 11/16/2021 Negative  Negative Final   • Blood, UA 11/16/2021 Negative  Negative Final   • Lot Number 11/16/2021 [id number]   Final   • Expiration Date 11/16/2021 [id number]   Final       HEALTH RISK ASSESSMENT    Smoking Status:  Social History     Tobacco Use   Smoking Status Never Smoker   Smokeless Tobacco Never Used   Tobacco Comment    Passive Smoke Exposure: No      Alcohol Consumption:  Social History     Substance and Sexual Activity   Alcohol Use No     Fall Risk Screen:    DIANEADI Fall Risk Assessment was completed, and patient is at LOW risk for falls.Assessment completed on:11/16/2021    Depression Screening:  PHQ-2/PHQ-9 Depression Screening 11/16/2021   Little interest or pleasure in doing things 0   Feeling down, depressed, or hopeless 0   Trouble falling or staying asleep, or sleeping too much 3   Feeling tired or having little energy 1   Poor appetite or overeating 0   Feeling bad about yourself - or that you are a failure or have let yourself or your family down 0   Trouble concentrating on things, such as reading the newspaper or watching television 0   Moving or speaking so slowly that other people could have noticed. Or the opposite - being so fidgety or restless that you have been moving around a lot more than usual 0   Thoughts that you would be better off dead, or of hurting yourself in some way 0   Total Score 4   If you checked off any problems, how difficult have these problems made it for you to do your work, take care of things at home, or get along with other people? Not difficult at all       Health Habits and Functional and Cognitive Screening:  Functional & Cognitive Status 11/16/2021   Do you have difficulty preparing food and eating? No   Do you have difficulty bathing  yourself, getting dressed or grooming yourself? No   Do you have difficulty using the toilet? No   Do you have difficulty moving around from place to place? No   Do you have trouble with steps or getting out of a bed or a chair? No   Current Diet Well Balanced Diet   Dental Exam Up to date   Eye Exam Not up to date   Exercise (times per week) 0 times per week   Current Exercises Include No Regular Exercise   Current Exercise Activities Include -   Do you need help using the phone?  No   Are you deaf or do you have serious difficulty hearing?  No   Do you need help with transportation? No   Do you need help shopping? No   Do you need help preparing meals?  No   Do you need help with housework?  No   Do you need help with laundry? No   Do you need help taking your medications? No   Do you need help managing money? No   Do you ever drive or ride in a car without wearing a seat belt? No   Have you felt unusual stress, anger or loneliness in the last month? No   Who do you live with? Child   If you need help, do you have trouble finding someone available to you? No   Have you been bothered in the last four weeks by sexual problems? No   Do you have difficulty concentrating, remembering or making decisions? No       Age-appropriate Screening Schedule:  Refer to the list below for future screening recommendations based on patient's age, sex and/or medical conditions. Orders for these recommended tests are listed in the plan section. The patient has been provided with a written plan.    Health Maintenance   Topic Date Due   • ZOSTER VACCINE (1 of 2) Never done   • DXA SCAN  07/02/2022   • MAMMOGRAM  10/13/2022   • LIPID PANEL  11/08/2022   • TDAP/TD VACCINES (3 - Td or Tdap) 08/02/2028   • INFLUENZA VACCINE  Completed              Assessment/Plan   CMS Preventative Services Quick Reference  Risk Factors Identified During Encounter  Immunizations Discussed/Encouraged (specific Immunizations; Hepatitis A Vaccine/Series,  Influenza, Shingrix and COVID19  The above risks/problems have been discussed with the patient.  Follow up actions/plans if indicated are seen below in the Assessment/Plan Section.  Pertinent information has been shared with the patient in the After Visit Summary.    Diagnoses and all orders for this visit:    1. Encounter for general adult medical examination with abnormal findings (Primary)  Comments:  Discussed age appropriate health maintenance. Recommended COVID booster, 2nd hep A vaccine, Shingrix.     2. Primary hypertension  Assessment & Plan:  Stable.     Orders:  -     POC Urinalysis Dipstick, Automated    3. Prediabetes  Assessment & Plan:  A1c increasing.   Discussed diet. Stay physically active.   Repeat in 3 months.     Orders:  -     Hemoglobin A1c; Future    4. Mixed hyperlipidemia  Assessment & Plan:  Restart Lipitor.   Repeat lipids and CMP in 3 months.     The 10-year ASCVD risk score ([place] PEDROZA Jr., et al., 2013) is: 16.8%    Values used to calculate the score:      Age: 71 years      Sex: Female      Is Non- : No      Diabetic: No      Tobacco smoker: No      Systolic Blood Pressure: 128 mmHg      Is BP treated: Yes      HDL Cholesterol: 41 mg/dL      Total Cholesterol: 257 mg/dL            Orders:  -     Lipid Panel; Future  -     Comprehensive Metabolic Panel; Future    5. Elevated liver enzymes  Assessment & Plan:  Possible fatty liver disease.   Encouraged healthy diet, regular physical activity.   Recheck in 3 months.       6. Abnormal urinalysis  -     Urine Culture - Urine, Urine, Clean Catch    7. Allergic rhinitis, unspecified seasonality, unspecified trigger  Assessment & Plan:  Encouraged her to use Zyrtec, Singulair and Flonase consistently.   Try taking Zyrtec at night to see if that helps with the daytime fatigue.       8. Stage II lobular carcinoma of breast, ER+, right (HCC)  Assessment & Plan:  Continue follow-up with Oncology.       9. Mixed anxiety  depressive disorder  Assessment & Plan:  Doing okay off of medication. Will monitor.       10. Osteopenia of multiple sites  Assessment & Plan:  On Zometa and calcium supplementation.       11. Primary osteoarthritis of both hands  Assessment & Plan:  Continue Tylenol as needed.       12. Primary insomnia  Assessment & Plan:  Will try increasing amitriptyline from 25 mg to 50 mg nightly.       13. Skin exam, screening for cancer  -     Ambulatory Referral to Dermatology    14. Need for vaccination  -     Fluzone High-Dose 65+yrs    Other orders  -     atorvastatin (Lipitor) 10 MG tablet; Take 1 tablet by mouth Daily.  Dispense: 90 tablet; Refill: 1  -     montelukast (SINGULAIR) 10 MG tablet; Take 1 tablet by mouth every night at bedtime.  Dispense: 90 tablet; Refill: 3  -     fluticasone (Flonase) 50 MCG/ACT nasal spray; 2 sprays into the nostril(s) as directed by provider Daily.  Dispense: 48 g; Refill: 3  -     cetirizine (zyrTEC) 10 MG tablet; Take 1 tablet by mouth Daily.  Dispense: 90 tablet; Refill: 3  -     losartan (COZAAR) 100 MG tablet; Take 1 tablet by mouth Daily.  Dispense: 90 tablet; Refill: 1  -     amitriptyline (ELAVIL) 50 MG tablet; Take 1 tablet by mouth Every Night.  Dispense: 90 tablet; Refill: 1      Follow Up:   Return in about 3 months (around 2/16/2022) for Follow-Up prediabetes, hyperlipidemia, sleep.     An After Visit Summary and PPPS were made available to the patient.

## 2021-11-16 NOTE — ASSESSMENT & PLAN NOTE
Possible fatty liver disease.   Encouraged healthy diet, regular physical activity.   Recheck in 3 months.

## 2021-11-16 NOTE — ASSESSMENT & PLAN NOTE
Restart Lipitor.   Repeat lipids and CMP in 3 months.     The 10-year ASCVD risk score ([place] PEDROZA Jr., et al., 2013) is: 16.8%    Values used to calculate the score:      Age: 71 years      Sex: Female      Is Non- : No      Diabetic: No      Tobacco smoker: No      Systolic Blood Pressure: 128 mmHg      Is BP treated: Yes      HDL Cholesterol: 41 mg/dL      Total Cholesterol: 257 mg/dL

## 2021-11-19 LAB
BACTERIA UR CULT: ABNORMAL
BACTERIA UR CULT: ABNORMAL
OTHER ANTIBIOTIC SUSC ISLT: ABNORMAL

## 2021-11-19 RX ORDER — SULFAMETHOXAZOLE AND TRIMETHOPRIM 800; 160 MG/1; MG/1
1 TABLET ORAL 2 TIMES DAILY
Qty: 20 TABLET | Refills: 0 | Status: SHIPPED | OUTPATIENT
Start: 2021-11-19 | End: 2021-11-29

## 2021-12-30 ENCOUNTER — OFFICE VISIT (OUTPATIENT)
Dept: FAMILY MEDICINE CLINIC | Facility: CLINIC | Age: 71
End: 2021-12-30

## 2021-12-30 VITALS
RESPIRATION RATE: 18 BRPM | OXYGEN SATURATION: 98 % | SYSTOLIC BLOOD PRESSURE: 109 MMHG | HEIGHT: 64 IN | HEART RATE: 96 BPM | BODY MASS INDEX: 26.29 KG/M2 | WEIGHT: 154 LBS | TEMPERATURE: 97.8 F | DIASTOLIC BLOOD PRESSURE: 75 MMHG

## 2021-12-30 DIAGNOSIS — R10.9 FLANK PAIN: Primary | ICD-10-CM

## 2021-12-30 LAB
BILIRUB BLD-MCNC: NEGATIVE MG/DL
CLARITY, POC: CLEAR
COLOR UR: YELLOW
EXPIRATION DATE: NORMAL
GLUCOSE UR STRIP-MCNC: NEGATIVE MG/DL
KETONES UR QL: NEGATIVE
LEUKOCYTE EST, POC: NEGATIVE
Lab: NORMAL
NITRITE UR-MCNC: NEGATIVE MG/ML
PH UR: 6 [PH] (ref 5–8)
PROT UR STRIP-MCNC: NEGATIVE MG/DL
RBC # UR STRIP: NEGATIVE /UL
SP GR UR: 1.02 (ref 1–1.03)
UROBILINOGEN UR QL: NORMAL

## 2021-12-30 PROCEDURE — 99212 OFFICE O/P EST SF 10 MIN: CPT | Performed by: NURSE PRACTITIONER

## 2021-12-30 PROCEDURE — 81003 URINALYSIS AUTO W/O SCOPE: CPT | Performed by: NURSE PRACTITIONER

## 2021-12-30 NOTE — ASSESSMENT & PLAN NOTE
Her UA is negative.   The pain sounds musculoskeletal in nature.   Recommended Tylenol PRN, heat and/or ice, gentle ROM exercises.   Return if not improving over the next week, sooner if worsening or if she develops other symptoms.

## 2021-12-30 NOTE — PROGRESS NOTES
Chief Complaint  Flank Pain    Subjective          History of Present Illness  Started having left flank pain 3-4 days ago. No known injury. She was concerned that it could be a UTI because at her 11/16/21 visit she was asymptomatic, but her UA was nitrite positive with trace leukocytes and a urine culture showed growth of e Coli. (She was treated with Bactrim).  She has been pushing fluids and took some Tylenol and the pain is better today. She has not had dysuria, urgency, or frequency.  Lying flat on her back and getting up or twisting motions make the pain a little worse. No change in bowel habits. No fever, chills.             Current Outpatient Medications:   •  acetaminophen (TYLENOL) 650 MG 8 hr tablet, Take 650 mg by mouth., Disp: , Rfl:   •  amitriptyline (ELAVIL) 50 MG tablet, Take 1 tablet by mouth Every Night., Disp: 90 tablet, Rfl: 1  •  atorvastatin (Lipitor) 10 MG tablet, Take 1 tablet by mouth Daily., Disp: 90 tablet, Rfl: 1  •  Biotin 10 MG tablet, Take 1 tablet by mouth Daily., Disp: , Rfl:   •  Calcium Carb-Cholecalciferol (CALCIUM-VITAMIN D) 600-400 MG-UNIT tablet, Take 1 tablet by mouth., Disp: , Rfl:   •  cetirizine (zyrTEC) 10 MG tablet, Take 1 tablet by mouth Daily., Disp: 90 tablet, Rfl: 3  •  fluticasone (Flonase) 50 MCG/ACT nasal spray, 2 sprays into the nostril(s) as directed by provider Daily., Disp: 48 g, Rfl: 3  •  folic acid (FOLVITE) 400 MCG tablet, Take 1 tablet by mouth Daily., Disp: , Rfl:   •  ibuprofen (ADVIL,MOTRIN) 800 MG tablet, Take 800 mg by mouth Every 6 (Six) Hours As Needed., Disp: , Rfl:   •  letrozole (FEMARA) 2.5 MG tablet, Take 2.5 mg by mouth Daily., Disp: , Rfl:   •  losartan (COZAAR) 100 MG tablet, Take 1 tablet by mouth Daily., Disp: 90 tablet, Rfl: 1  •  montelukast (SINGULAIR) 10 MG tablet, Take 1 tablet by mouth every night at bedtime., Disp: 90 tablet, Rfl: 3  •  Multiple Vitamin (MULTI-VITAMIN) tablet, Take 1 tablet by mouth Daily., Disp: , Rfl:   •   "ZOLEDRONIC ACID IV, Infuse  into a venous catheter Every 6 (Six) Months., Disp: , Rfl:      Review of Systems   Constitutional: Negative for chills and fever.   Respiratory: Negative for cough and shortness of breath.    Cardiovascular: Negative for chest pain.   Gastrointestinal: Negative for abdominal pain, constipation, diarrhea, nausea and vomiting.   Genitourinary: Negative for dysuria, frequency, hematuria and pelvic pain.   Musculoskeletal: Negative for gait problem.   Neurological: Negative for headache.        Objective   Vital Signs:   Vitals:    12/30/21 1016   BP: 109/75   BP Location: Left arm   Patient Position: Sitting   Cuff Size: Large Adult   Pulse: 96   Resp: 18   Temp: 97.8 °F (36.6 °C)   TempSrc: Infrared   SpO2: 98%   Weight: 69.9 kg (154 lb)   Height: 161.3 cm (63.5\")     Body mass index is 26.85 kg/m².    Physical Exam  Constitutional:       Appearance: She is well-developed.   Neck:      Thyroid: No thyromegaly.      Vascular: No carotid bruit.      Trachea: Trachea normal.   Cardiovascular:      Rate and Rhythm: Normal rate and regular rhythm.      Heart sounds: Normal heart sounds. No murmur heard.  No friction rub. No gallop.    Pulmonary:      Effort: Pulmonary effort is normal.      Breath sounds: Normal breath sounds. No wheezing or rales.   Abdominal:      General: Bowel sounds are normal.      Palpations: Abdomen is soft.      Tenderness: There is no abdominal tenderness. There is no right CVA tenderness or left CVA tenderness.      Hernia: No hernia is present.   Musculoskeletal:         General: Normal range of motion.      Cervical back: Normal range of motion and neck supple.      Lumbar back: No deformity or tenderness. Normal range of motion. Negative right straight leg raise test and negative left straight leg raise test.   Lymphadenopathy:      Cervical: No cervical adenopathy.   Skin:     General: Skin is warm and dry.   Neurological:      Mental Status: She is alert and " oriented to person, place, and time.   Psychiatric:         Behavior: Behavior normal.         Thought Content: Thought content normal.         Judgment: Judgment normal.          Result Review :   The following data was reviewed by: LEO Longo on 12/30/2021:    Office Visit on 12/30/2021   Component Date Value Ref Range Status   • Color 12/30/2021 Yellow  Yellow, Straw, Dark Yellow, Kendra Final   • Clarity, UA 12/30/2021 Clear  Clear Final   • Specific Gravity  12/30/2021 1.025  1.005 - 1.030 Final   • pH, Urine 12/30/2021 6.0  5.0 - 8.0 Final   • Leukocytes 12/30/2021 Negative  Negative Final   • Nitrite, UA 12/30/2021 Negative  Negative Final   • Protein, POC 12/30/2021 Negative  Negative mg/dL Final   • Glucose, UA 12/30/2021 Negative  Negative, 1000 mg/dL (3+) mg/dL Final   • Ketones, UA 12/30/2021 Negative  Negative Final   • Urobilinogen, UA 12/30/2021 Normal  Normal Final   • Bilirubin 12/30/2021 Negative  Negative Final   • Blood, UA 12/30/2021 Negative  Negative Final   • Lot Number 12/30/2021 [id number]   Final   • Expiration Date 12/30/2021 [id number]   Final                 Assessment and Plan    Diagnoses and all orders for this visit:    1. Flank pain (Primary)  Assessment & Plan:  Her UA is negative.   The pain sounds musculoskeletal in nature.   Recommended Tylenol PRN, heat and/or ice, gentle ROM exercises.   Return if not improving over the next week, sooner if worsening or if she develops other symptoms.     Orders:  -     POC Urinalysis Dipstick, Automated          Follow Up   No follow-ups on file.    Patient was given instructions and counseling regarding her condition and health maintenance advice. Please see specific information in the After Visit Summary.     LEO Longo 12/30/2021 10:45 EST  This note was electronically signed.

## 2022-01-04 RX ORDER — AMITRIPTYLINE HYDROCHLORIDE 25 MG/1
25 TABLET, FILM COATED ORAL NIGHTLY
Qty: 90 TABLET | Refills: 0 | OUTPATIENT
Start: 2022-01-04

## 2022-05-23 RX ORDER — ATORVASTATIN CALCIUM 10 MG/1
10 TABLET, FILM COATED ORAL DAILY
Qty: 30 TABLET | Refills: 0 | Status: SHIPPED | OUTPATIENT
Start: 2022-05-23 | End: 2022-05-24

## 2022-05-23 NOTE — TELEPHONE ENCOUNTER
Caller: Elizabeth Momin    Relationship: Self    Best call back number: [phone]    What is the best time to reach you: ANY     Who are you requesting to speak with (clinical staff, provider,  specific staff member): CLINICAL     What was the call regarding: PATIENT WAS A PATIENT OF [facility]. PATIENT IS NEEDING HER MEDICATIONS REFILLED AND [facility]. PATIENT WOULD LIKE TO KNOW IF [facility] CAN TAKE HER IN AS A NEW PATIENT AND REFILL HER ATORVASTATIN MEDICATION. PLEASE CALL AND ADVISE IF TIP CAN TAKE HER AS A NEW PATIENT.     PATIENT WAS PROVIDED THAT [facility] IS NOT ACCEPTING PATIENT     Do you require a callback: YES

## 2022-05-23 NOTE — TELEPHONE ENCOUNTER
Called and spoke with patient, advised her that [facility]'s panel was full and she was not taking any new patients at this time but that she would send in 30 days worth of her atorvastatin while she looks for another PCP.

## 2022-05-24 RX ORDER — ATORVASTATIN CALCIUM 10 MG/1
10 TABLET, FILM COATED ORAL DAILY
Qty: 30 TABLET | Refills: 0 | Status: SHIPPED | OUTPATIENT
Start: 2022-05-24 | End: 2022-07-07

## 2022-05-26 ENCOUNTER — OFFICE VISIT (OUTPATIENT)
Dept: FAMILY MEDICINE CLINIC | Facility: CLINIC | Age: 72
End: 2022-05-26

## 2022-05-26 VITALS
TEMPERATURE: 97.9 F | HEIGHT: 64 IN | BODY MASS INDEX: 26.29 KG/M2 | WEIGHT: 154 LBS | DIASTOLIC BLOOD PRESSURE: 84 MMHG | SYSTOLIC BLOOD PRESSURE: 132 MMHG | OXYGEN SATURATION: 97 % | HEART RATE: 100 BPM

## 2022-05-26 DIAGNOSIS — J32.9 CHRONIC SINUSITIS, UNSPECIFIED LOCATION: ICD-10-CM

## 2022-05-26 DIAGNOSIS — F41.8 MIXED ANXIETY DEPRESSIVE DISORDER: ICD-10-CM

## 2022-05-26 DIAGNOSIS — R35.0 FREQUENCY OF URINATION: Primary | ICD-10-CM

## 2022-05-26 DIAGNOSIS — M85.89 OSTEOPENIA OF MULTIPLE SITES: ICD-10-CM

## 2022-05-26 DIAGNOSIS — R73.03 PREDIABETES: ICD-10-CM

## 2022-05-26 DIAGNOSIS — R05.9 COUGH: ICD-10-CM

## 2022-05-26 DIAGNOSIS — Z17.0: ICD-10-CM

## 2022-05-26 DIAGNOSIS — R53.83 FATIGUE, UNSPECIFIED TYPE: ICD-10-CM

## 2022-05-26 DIAGNOSIS — Z78.0 POST-MENOPAUSAL: ICD-10-CM

## 2022-05-26 DIAGNOSIS — N30.00 ACUTE CYSTITIS WITHOUT HEMATURIA: ICD-10-CM

## 2022-05-26 DIAGNOSIS — C50.911: ICD-10-CM

## 2022-05-26 PROBLEM — F51.04 CHRONIC INSOMNIA: Status: ACTIVE | Noted: 2019-06-14

## 2022-05-26 LAB
BILIRUB BLD-MCNC: NEGATIVE MG/DL
CLARITY, POC: CLEAR
COLOR UR: YELLOW
EXPIRATION DATE: ABNORMAL
EXPIRATION DATE: NORMAL
FLUAV AG NPH QL: NEGATIVE
FLUBV AG NPH QL: NEGATIVE
GLUCOSE UR STRIP-MCNC: NEGATIVE MG/DL
INTERNAL CONTROL: NORMAL
KETONES UR QL: NEGATIVE
LEUKOCYTE EST, POC: ABNORMAL
Lab: ABNORMAL
Lab: NORMAL
NITRITE UR-MCNC: NEGATIVE MG/ML
PH UR: 5 [PH] (ref 5–8)
PROT UR STRIP-MCNC: NEGATIVE MG/DL
RBC # UR STRIP: NEGATIVE /UL
SARS-COV-2 ORF1AB RESP QL NAA+PROBE: DETECTED
SP GR UR: 1 (ref 1–1.03)
UROBILINOGEN UR QL: NORMAL

## 2022-05-26 PROCEDURE — U0004 COV-19 TEST NON-CDC HGH THRU: HCPCS | Performed by: PREVENTIVE MEDICINE

## 2022-05-26 PROCEDURE — U0005 INFEC AGEN DETEC AMPLI PROBE: HCPCS | Performed by: PREVENTIVE MEDICINE

## 2022-05-26 PROCEDURE — 87804 INFLUENZA ASSAY W/OPTIC: CPT | Performed by: PREVENTIVE MEDICINE

## 2022-05-26 PROCEDURE — 99214 OFFICE O/P EST MOD 30 MIN: CPT | Performed by: PREVENTIVE MEDICINE

## 2022-05-26 PROCEDURE — 87086 URINE CULTURE/COLONY COUNT: CPT | Performed by: PREVENTIVE MEDICINE

## 2022-05-26 PROCEDURE — 81003 URINALYSIS AUTO W/O SCOPE: CPT | Performed by: PREVENTIVE MEDICINE

## 2022-05-26 PROCEDURE — 87186 SC STD MICRODIL/AGAR DIL: CPT | Performed by: PREVENTIVE MEDICINE

## 2022-05-26 RX ORDER — NITROFURANTOIN 25; 75 MG/1; MG/1
100 CAPSULE ORAL 2 TIMES DAILY
Qty: 14 CAPSULE | Refills: 0 | Status: SHIPPED | OUTPATIENT
Start: 2022-05-26 | End: 2022-06-09

## 2022-05-26 NOTE — PROGRESS NOTES
"Subjective   Elizabeth Momin is a 72 y.o. female presents for   Chief Complaint   Patient presents with   • Urinary Tract Infection   • Cough   • Fatigue   Patient presents today to transition into care for what appears to be a urinary tract infection cough for the last week with COVID exposure and fatigue for the last 5 to 6 months.  She did have some labs run by her oncologist and no cause for the fatigue could be found so we will do some additional studies and then advised that she has a sleep study and that she does have insomnia but admits to no snoring.  Patient states she has had no weight loss and has had urinary tract infections without symptoms in the past.    Health Maintenance Due   Topic Date Due   • ZOSTER VACCINE (1 of 2) Never done   • COVID-19 Vaccine (4 - Booster for Pfizer series) 03/13/2022       History of Present Illness     Vitals:    05/26/22 1114   BP: 132/84   BP Location: Left arm   Patient Position: Sitting   Cuff Size: Adult   Pulse: 100   Temp: 97.9 °F (36.6 °C)   TempSrc: Temporal   SpO2: 97%   Weight: 69.9 kg (154 lb)   Height: 161.3 cm (63.5\")     Body mass index is 26.85 kg/m².    Current Outpatient Medications on File Prior to Visit   Medication Sig Dispense Refill   • acetaminophen (TYLENOL) 650 MG 8 hr tablet Take 650 mg by mouth.     • amitriptyline (ELAVIL) 50 MG tablet Take 1 tablet by mouth Every Night. 90 tablet 1   • atorvastatin (LIPITOR) 10 MG tablet TAKE 1 TABLET BY MOUTH DAILY 30 tablet 0   • Biotin 10 MG tablet Take 1 tablet by mouth Daily.     • Calcium Carb-Cholecalciferol (CALCIUM-VITAMIN D) 600-400 MG-UNIT tablet Take 1 tablet by mouth.     • cetirizine (zyrTEC) 10 MG tablet Take 1 tablet by mouth Daily. 90 tablet 3   • folic acid (FOLVITE) 400 MCG tablet Take 1 tablet by mouth Daily.     • ibuprofen (ADVIL,MOTRIN) 800 MG tablet Take 800 mg by mouth Every 6 (Six) Hours As Needed.     • letrozole (FEMARA) 2.5 MG tablet Take 2.5 mg by mouth Daily.     • losartan " (COZAAR) 100 MG tablet Take 1 tablet by mouth Daily. 90 tablet 1   • montelukast (SINGULAIR) 10 MG tablet Take 1 tablet by mouth every night at bedtime. 90 tablet 3   • Multiple Vitamin (MULTI-VITAMIN) tablet Take 1 tablet by mouth Daily.     • [DISCONTINUED] fluticasone (Flonase) 50 MCG/ACT nasal spray 2 sprays into the nostril(s) as directed by provider Daily. 48 g 3   • [DISCONTINUED] ZOLEDRONIC ACID IV Infuse  into a venous catheter Every 6 (Six) Months.       No current facility-administered medications on file prior to visit.       The following portions of the patient's history were reviewed and updated as appropriate: allergies, current medications, past family history, past medical history, past social history, past surgical history and problem list.    Review of Systems   Constitutional: Positive for fatigue. Negative for unexpected weight loss.   HENT: Positive for sinus pressure.    Respiratory: Positive for cough.    Cardiovascular: Negative for chest pain.   Genitourinary: Positive for flank pain.   Psychiatric/Behavioral: Positive for sleep disturbance and depressed mood.       Objective   Physical Exam  Vitals reviewed.   Constitutional:       General: She is not in acute distress.     Appearance: Normal appearance. She is well-developed. She is not ill-appearing or toxic-appearing.   HENT:      Head: Normocephalic and atraumatic.      Right Ear: Tympanic membrane, ear canal and external ear normal.      Left Ear: Tympanic membrane, ear canal and external ear normal.      Nose: Nose normal.      Mouth/Throat:      Mouth: Mucous membranes are moist.      Pharynx: No posterior oropharyngeal erythema.   Eyes:      Extraocular Movements: Extraocular movements intact.      Conjunctiva/sclera: Conjunctivae normal.      Pupils: Pupils are equal, round, and reactive to light.   Cardiovascular:      Rate and Rhythm: Normal rate and regular rhythm.      Heart sounds: Normal heart sounds.   Pulmonary:       Effort: Pulmonary effort is normal.      Breath sounds: Normal breath sounds.   Abdominal:      General: Bowel sounds are normal. There is no distension.      Palpations: Abdomen is soft. There is no mass.      Tenderness: There is no abdominal tenderness.   Musculoskeletal:         General: Normal range of motion.      Cervical back: Neck supple.   Skin:     General: Skin is warm.   Neurological:      General: No focal deficit present.      Mental Status: She is alert and oriented to person, place, and time.   Psychiatric:         Mood and Affect: Mood normal.         Behavior: Behavior normal.       PHQ-9 Total Score: 0    Assessment & Plan   Diagnoses and all orders for this visit:    1. Frequency of urination (Primary)  Comments:  Patient has noticed some frequency and burning with urination the last couple of days analysis showed small leukocytes so we did culture the urine and began her  Orders:  -     POCT urinalysis dipstick, automated  -     Urine Culture - Urine, Urine, Clean Catch; Future  -     Urine Culture - Urine, Urine, Clean Catch    2. Stage II lobular carcinoma of breast, ER+, right (HCC)  Comments:  Patient is being followed by oncologist at [facility].    3. Prediabetes  Comments:  Patient is watching carbs and increasing walking    4. Osteopenia of multiple sites  Comments:  DEXA scan repeat planned    5. Mixed anxiety depressive disorder  Comments:  Anxiety and depression seems to be controlled with alternative medication she has been off the Celexa for several years now    6. Chronic sinusitis, unspecified location  Comments:  Patient wishes referral to ear nose and throat doctor and that she has been fighting chronic sinus infection for many years now.  Orders:  -     Ambulatory Referral to ENT (Otolaryngology)    7. Post-menopausal  Comments:  DEXA scan advised  Orders:  -     DEXA Bone Density Axial; Future    8. Cough  Comments:  COVID exposure on Sunday has been coughing since Monday home  test was negative PCR is pending she will home quarantine till results are known and repeat next we  Orders:  -     COVID-19,APTIMA PANTHER([place]),[facility], NP/OP SWAB IN UTM/VTM/SALINE TRANSPORT MEDIA,24 HR TAT - Swab, Nasopharynx; Future  -     POCT Influenza A/B  -     COVID-19,APTIMA PANTHER([place]),[facility], NP/OP SWAB IN UTM/VTM/SALINE TRANSPORT MEDIA,24 HR TAT - Swab, Nasopharynx    9. Acute cystitis without hematuria  Comments:  She has been encouraged to increase her fluids and can continue the cranberry juice and reculture once results of the initial culture if indicated    10. Fatigue, unspecified type  Comments:  For 5 months.    Other orders  -     nitrofurantoin, macrocrystal-monohydrate, (Macrobid) 100 MG capsule; Take 1 capsule by mouth 2 (Two) Times a Day.  Dispense: 14 capsule; Refill: 0        Patient Instructions     Health Maintenance Due   Topic Date Due   • ZOSTER VACCINE (1 of 2) Never done   • COLORECTAL CANCER SCREENING  07/10/2020   • COVID-19 Vaccine (4 - Booster for Pfizer series) 03/13/2022    Take one Vitamin B12 weekly 1000 units.  Take antibiotics till gone and continue to increase fluids.

## 2022-05-26 NOTE — PATIENT INSTRUCTIONS
Health Maintenance Due   Topic Date Due    ZOSTER VACCINE (1 of 2) Never done    COLORECTAL CANCER SCREENING  07/10/2020    COVID-19 Vaccine (4 - Booster for Pfizer series) 03/13/2022    Take one Vitamin B12 weekly 1000 units.  Take antibiotics till gone and continue to increase fluids.

## 2022-05-27 ENCOUNTER — TELEPHONE (OUTPATIENT)
Dept: FAMILY MEDICINE CLINIC | Facility: CLINIC | Age: 72
End: 2022-05-27

## 2022-05-27 NOTE — PROGRESS NOTES
Patient informed of positive COVID test she is continuing to quarantine will monitor oxygen saturation.  Did sleep well last night.  Not certain the first day of her symptoms but does not think she would consider Paxil of that at this stage.  We will call her back tomorrow in regards to her urine culture test.  Patient understands that she does not need to retest next week but she could retest if she wishes.

## 2022-05-27 NOTE — TELEPHONE ENCOUNTER
----- Message from Lise Bryant MD sent at 5/27/2022  8:15 AM EDT -----  Patient informed of positive COVID test she is continuing to quarantine will monitor oxygen saturation.  Did sleep well last night.  Not certain the first day of her symptoms but does not think she would consider Paxil of that at this stage.  We will call her back tomorrow in regards to her urine culture test.  Patient understands that she does not need to retest next week but she could retest if she wishes.

## 2022-05-28 DIAGNOSIS — N39.0 URINARY TRACT INFECTION WITHOUT HEMATURIA, SITE UNSPECIFIED: Primary | ICD-10-CM

## 2022-05-28 LAB — BACTERIA SPEC AEROBE CULT: ABNORMAL

## 2022-05-28 NOTE — PROGRESS NOTES
Patient advised that the antibiotic she is on should take care of of the bladder infection.  She is feeling improved from the COVID and her aching has stopped.  Cough is improving.  She will come in and reculture her urine after she is off the antibiotic for 3 days order placed.

## 2022-06-06 ENCOUNTER — CLINICAL SUPPORT (OUTPATIENT)
Dept: FAMILY MEDICINE CLINIC | Facility: CLINIC | Age: 72
End: 2022-06-06

## 2022-06-06 DIAGNOSIS — N39.0 URINARY TRACT INFECTION WITHOUT HEMATURIA, SITE UNSPECIFIED: ICD-10-CM

## 2022-06-06 PROCEDURE — 87186 SC STD MICRODIL/AGAR DIL: CPT | Performed by: PREVENTIVE MEDICINE

## 2022-06-06 PROCEDURE — 87088 URINE BACTERIA CULTURE: CPT | Performed by: PREVENTIVE MEDICINE

## 2022-06-06 PROCEDURE — 87086 URINE CULTURE/COLONY COUNT: CPT | Performed by: PREVENTIVE MEDICINE

## 2022-06-07 ENCOUNTER — TELEPHONE (OUTPATIENT)
Dept: FAMILY MEDICINE CLINIC | Facility: CLINIC | Age: 72
End: 2022-06-07

## 2022-06-07 NOTE — TELEPHONE ENCOUNTER
PATIENT STATES: THAT SHE WOULD LIKE A CALL BACK TO GO OVER HER LAST LAB PLEASE ADVISE      PATIENT CAN BE REACHED ON: [phone]

## 2022-06-08 ENCOUNTER — PATIENT ROUNDING (BHMG ONLY) (OUTPATIENT)
Dept: FAMILY MEDICINE CLINIC | Facility: CLINIC | Age: 72
End: 2022-06-08

## 2022-06-08 LAB — BACTERIA SPEC AEROBE CULT: ABNORMAL

## 2022-06-08 NOTE — PROGRESS NOTES
A [employer] message has been sent to the patient for patient rounding with [facility]

## 2022-06-09 ENCOUNTER — TELEPHONE (OUTPATIENT)
Dept: FAMILY MEDICINE CLINIC | Facility: CLINIC | Age: 72
End: 2022-06-09

## 2022-06-09 DIAGNOSIS — N39.0 URINARY TRACT INFECTION WITHOUT HEMATURIA, SITE UNSPECIFIED: Primary | ICD-10-CM

## 2022-06-09 RX ORDER — NITROFURANTOIN 25; 75 MG/1; MG/1
100 CAPSULE ORAL 2 TIMES DAILY
Qty: 20 CAPSULE | Refills: 0 | Status: SHIPPED | OUTPATIENT
Start: 2022-06-09 | End: 2023-03-09

## 2022-06-09 NOTE — PROGRESS NOTES
Patient still has signs of a urinary tract infection we should try 10 days of Macrobid and have her culture 3 days after she is off the medicine continue to rest and drink plenty of fluids call if any other questions or concerns I will send your prescription to the pharmacy we have listed and then she can come in in about 2 weeks to repeat her culture.

## 2022-06-09 NOTE — TELEPHONE ENCOUNTER
HUB TO READ:  ----- Message from Lise Bryant MD sent at 6/9/2022  7:11 AM EDT -----  Patient still has signs of a urinary tract infection we should try 10 days of Macrobid and have her culture 3 days after she is off the medicine continue to rest and drink plenty of fluids call if any other questions or concerns I will send your prescription to the pharmacy we have listed and then she can come in in about 2 weeks to repeat her culture.

## 2022-06-09 NOTE — TELEPHONE ENCOUNTER
READ THE FOLLOWING HUB TO READ MESSAGE TO THE PATIENT:    HUB TO READ:  ----- Message from Lise Bryant MD sent at 6/9/2022  7:11 AM EDT -----  Patient still has signs of a urinary tract infection we should try 10 days of Macrobid and have her culture 3 days after she is off the medicine continue to rest and drink plenty of fluids call if any other questions or concerns I will send your prescription to the pharmacy we have listed and then she can come in in about 2 weeks to repeat her culture.    ADVISED PATIENT TO CHECK WITH PHARMACY TO VERIFY PRESCRIPTION IS READY.    PATIENT HAD NO FURTHER QUESTIONS AT THIS TIME.    CONTACT: [phone] (h)

## 2022-07-05 ENCOUNTER — APPOINTMENT (OUTPATIENT)
Dept: BONE DENSITY | Facility: HOSPITAL | Age: 72
End: 2022-07-05

## 2022-07-07 RX ORDER — ATORVASTATIN CALCIUM 10 MG/1
10 TABLET, FILM COATED ORAL DAILY
Qty: 30 TABLET | Refills: 0 | Status: SHIPPED | OUTPATIENT
Start: 2022-07-07 | End: 2022-08-16

## 2022-07-07 RX ORDER — LOSARTAN POTASSIUM 100 MG/1
100 TABLET ORAL DAILY
Qty: 90 TABLET | Refills: 1 | Status: SHIPPED | OUTPATIENT
Start: 2022-07-07 | End: 2023-01-05

## 2022-07-08 ENCOUNTER — TELEPHONE (OUTPATIENT)
Dept: FAMILY MEDICINE CLINIC | Facility: CLINIC | Age: 72
End: 2022-07-08

## 2022-07-08 NOTE — TELEPHONE ENCOUNTER
HUB TO READ:    Re-sent the referral to Dr. Neville Dinh in [place]. The phone number to schedule is [phone]. Attempted to reach patient with info- no answer- left VM.

## 2022-07-08 NOTE — TELEPHONE ENCOUNTER
PATIENT WANTED TO KNOW IF YOU COULD REFER HER TO ANOTHER Otolaryngology MD IN [facility] INSTEAD OF [place]      PLEASE CALL AND ADVISE  Elizabeth Momin (Self) [phone] (H)

## 2022-08-16 RX ORDER — ATORVASTATIN CALCIUM 10 MG/1
10 TABLET, FILM COATED ORAL DAILY
Qty: 30 TABLET | Refills: 0 | Status: SHIPPED | OUTPATIENT
Start: 2022-08-16 | End: 2022-08-17 | Stop reason: SDUPTHER

## 2022-08-17 RX ORDER — ATORVASTATIN CALCIUM 10 MG/1
10 TABLET, FILM COATED ORAL DAILY
Qty: 90 TABLET | Refills: 1 | Status: SHIPPED | OUTPATIENT
Start: 2022-08-17 | End: 2023-02-28 | Stop reason: SDUPTHER

## 2022-09-16 RX ORDER — AMITRIPTYLINE HYDROCHLORIDE 50 MG/1
50 TABLET, FILM COATED ORAL NIGHTLY
Qty: 90 TABLET | Refills: 1 | OUTPATIENT
Start: 2022-09-16

## 2022-09-20 NOTE — TELEPHONE ENCOUNTER
Please call patient and see if she needs Amitriptyline and put in dose and order 90 days with no refill

## 2022-09-21 NOTE — TELEPHONE ENCOUNTER
Spoke to patient- she does need the 50's.   She cuts them in  1/2. Waits 1 hour and if not asleep she takes the other half. Most nights she needs both halfs.

## 2022-09-22 RX ORDER — AMITRIPTYLINE HYDROCHLORIDE 50 MG/1
50 TABLET, FILM COATED ORAL NIGHTLY
Qty: 90 TABLET | Refills: 1 | Status: SHIPPED | OUTPATIENT
Start: 2022-09-22

## 2022-09-28 ENCOUNTER — OFFICE VISIT (OUTPATIENT)
Dept: FAMILY MEDICINE CLINIC | Facility: CLINIC | Age: 72
End: 2022-09-28

## 2022-09-28 VITALS
SYSTOLIC BLOOD PRESSURE: 135 MMHG | WEIGHT: 154.2 LBS | OXYGEN SATURATION: 96 % | TEMPERATURE: 99 F | DIASTOLIC BLOOD PRESSURE: 84 MMHG | BODY MASS INDEX: 26.32 KG/M2 | HEIGHT: 64 IN | HEART RATE: 104 BPM

## 2022-09-28 DIAGNOSIS — R10.819 LOWER ABDOMINAL TENDERNESS: ICD-10-CM

## 2022-09-28 DIAGNOSIS — M54.9 BACK PAIN, UNSPECIFIED BACK LOCATION, UNSPECIFIED BACK PAIN LATERALITY, UNSPECIFIED CHRONICITY: Primary | ICD-10-CM

## 2022-09-28 LAB
BILIRUB BLD-MCNC: NEGATIVE MG/DL
CLARITY, POC: CLEAR
COLOR UR: YELLOW
EXPIRATION DATE: NORMAL
GLUCOSE UR STRIP-MCNC: NEGATIVE MG/DL
KETONES UR QL: NEGATIVE
LEUKOCYTE EST, POC: NEGATIVE
Lab: NORMAL
NITRITE UR-MCNC: NEGATIVE MG/ML
PH UR: 5.5 [PH] (ref 5–8)
PROT UR STRIP-MCNC: NEGATIVE MG/DL
RBC # UR STRIP: NEGATIVE /UL
SP GR UR: 1.01 (ref 1–1.03)
UROBILINOGEN UR QL: NORMAL

## 2022-09-28 PROCEDURE — 81003 URINALYSIS AUTO W/O SCOPE: CPT | Performed by: NURSE PRACTITIONER

## 2022-09-28 PROCEDURE — 87086 URINE CULTURE/COLONY COUNT: CPT | Performed by: PREVENTIVE MEDICINE

## 2022-09-28 PROCEDURE — 99213 OFFICE O/P EST LOW 20 MIN: CPT | Performed by: NURSE PRACTITIONER

## 2022-09-28 RX ORDER — SODIUM PHOSPHATE,MONO-DIBASIC 19G-7G/118
ENEMA (ML) RECTAL
COMMUNITY

## 2022-09-28 NOTE — PROGRESS NOTES
"Subjective   Elizabeth Momin is a 72 y.o. female presents for   Chief Complaint   Patient presents with   • Back Pain     X 3 days, no known injury        Health Maintenance Due   Topic Date Due   • ZOSTER VACCINE (1 of 2) Never done   • COVID-19 Vaccine (4 - Booster for Pfizer series) 02/07/2022       History of Present Illness   Pt present with back pain since Monday.  She states she woke up and felt pain when trying to stand up straight.  She went about her normal activity and states it continued to nag at her.  advil did not improve pain.  She states she drank a large glass of cranberry juice yesterday and pain decreased but returned again this morning.   Vitals:    09/28/22 1118   BP: 135/84   BP Location: Left arm   Patient Position: Sitting   Cuff Size: Adult   Pulse: 104   Temp: 99 °F (37.2 °C)   TempSrc: Temporal   SpO2: 96%   Weight: 69.9 kg (154 lb 3.2 oz)   Height: 161.3 cm (63.5\")     Body mass index is 26.88 kg/m².    Current Outpatient Medications on File Prior to Visit   Medication Sig Dispense Refill   • acetaminophen (TYLENOL) 650 MG 8 hr tablet Take 650 mg by mouth.     • amitriptyline (ELAVIL) 50 MG tablet Take 1 tablet by mouth Every Night. 90 tablet 1   • atorvastatin (LIPITOR) 10 MG tablet TAKE 1 TABLET BY MOUTH DAILY 90 tablet 1   • Biotin 10 MG tablet Take 1 tablet by mouth Daily.     • Calcium Carb-Cholecalciferol (CALCIUM-VITAMIN D) 600-400 MG-UNIT tablet Take 1 tablet by mouth.     • cetirizine (zyrTEC) 10 MG tablet Take 1 tablet by mouth Daily. 90 tablet 3   • folic acid (FOLVITE) 400 MCG tablet Take 1 tablet by mouth Daily.     • glucosamine-chondroitin 500-400 MG capsule capsule Take  by mouth 3 (Three) Times a Day With Meals.     • ibuprofen (ADVIL,MOTRIN) 800 MG tablet Take 800 mg by mouth Every 6 (Six) Hours As Needed.     • letrozole (FEMARA) 2.5 MG tablet Take 2.5 mg by mouth Daily.     • losartan (COZAAR) 100 MG tablet TAKE 1 TABLET BY MOUTH DAILY 90 tablet 1   • montelukast " (SINGULAIR) 10 MG tablet Take 1 tablet by mouth every night at bedtime. 90 tablet 3   • Multiple Vitamin (MULTI-VITAMIN) tablet Take 1 tablet by mouth Daily.     • nitrofurantoin, macrocrystal-monohydrate, (Macrobid) 100 MG capsule Take 1 capsule by mouth 2 (Two) Times a Day. 20 capsule 0     No current facility-administered medications on file prior to visit.       The following portions of the patient's history were reviewed and updated as appropriate: allergies, current medications, past family history, past medical history, past social history, past surgical history, and problem list.    Review of Systems   Constitutional: Negative for chills and fever.   HENT: Negative for sinus pressure and sore throat.    Eyes: Negative for blurred vision.   Respiratory: Negative for cough and shortness of breath.    Cardiovascular: Negative for chest pain.   Gastrointestinal: Positive for abdominal pain (lower abdomen).   Endocrine: Negative.    Genitourinary: Negative.    Musculoskeletal: Positive for back pain. Negative for arthralgias and joint swelling.   Skin: Negative for color change.   Allergic/Immunologic: Negative.    Neurological: Negative for dizziness.   Hematological: Negative.    Psychiatric/Behavioral: Negative for behavioral problems.       Objective   Physical Exam  Vitals and nursing note reviewed.   Constitutional:       Appearance: Normal appearance. She is well-developed.   HENT:      Head: Normocephalic and atraumatic.      Right Ear: External ear normal.      Left Ear: External ear normal.      Nose: Nose normal.   Eyes:      Extraocular Movements: Extraocular movements intact.      Pupils: Pupils are equal, round, and reactive to light.   Cardiovascular:      Rate and Rhythm: Normal rate and regular rhythm.      Heart sounds: Normal heart sounds.   Pulmonary:      Effort: Pulmonary effort is normal.      Breath sounds: Normal breath sounds.   Abdominal:      General: Bowel sounds are normal.       Palpations: Abdomen is soft.      Tenderness: There is abdominal tenderness (lower abdomen).   Genitourinary:     Vagina: Normal.   Musculoskeletal:         General: Normal range of motion.      Cervical back: Normal range of motion and neck supple.      Comments: Lumbar rom wnl, painful with extension  BLE strength 5/5     Skin:     General: Skin is warm and dry.   Neurological:      General: No focal deficit present.      Mental Status: She is alert and oriented to person, place, and time.   Psychiatric:         Mood and Affect: Mood normal.         Behavior: Behavior normal.         Judgment: Judgment normal.       PHQ-9 Total Score:      Assessment & Plan   Diagnoses and all orders for this visit:    1. Back pain, unspecified back location, unspecified back pain laterality, unspecified chronicity (Primary)  Comments:  discussed causes, poct normal,will send for ua and culture if indicated,continue to rest, hydrate well,call for worsening. pt declined xray at this time  Orders:  -     POCT urinalysis dipstick, automated  -     Urine Culture - Urine, Urine, Clean Catch; Future  -     Urine Culture - Urine, Urine, Clean Catch    2. Lower abdominal tenderness  -     Urine Culture - Urine, Urine, Clean Catch        There are no Patient Instructions on file for this visit.

## 2022-09-29 LAB — BACTERIA SPEC AEROBE CULT: NO GROWTH

## 2022-09-30 ENCOUNTER — TELEPHONE (OUTPATIENT)
Dept: FAMILY MEDICINE CLINIC | Facility: CLINIC | Age: 72
End: 2022-09-30

## 2022-09-30 NOTE — TELEPHONE ENCOUNTER
----- Message from Lise Bryant MD sent at 9/30/2022  7:14 AM EDT -----  Urine does not show any signs of infection.  Call if any other questions or concerns

## 2022-11-16 ENCOUNTER — APPOINTMENT (OUTPATIENT)
Dept: MAMMOGRAPHY | Facility: HOSPITAL | Age: 72
End: 2022-11-16

## 2022-11-16 ENCOUNTER — APPOINTMENT (OUTPATIENT)
Dept: BONE DENSITY | Facility: HOSPITAL | Age: 72
End: 2022-11-16

## 2022-12-29 RX ORDER — LOSARTAN POTASSIUM 100 MG/1
100 TABLET ORAL DAILY
Qty: 90 TABLET | Refills: 1 | OUTPATIENT
Start: 2022-12-29

## 2023-01-03 RX ORDER — LOSARTAN POTASSIUM 100 MG/1
100 TABLET ORAL DAILY
Qty: 90 TABLET | Refills: 1 | OUTPATIENT
Start: 2023-01-03

## 2023-01-04 NOTE — TELEPHONE ENCOUNTER
PATIENT IS CALLING TO CHECK THE STATUS OF THIS MEDICATION.     PATIENT STATED SHE HAS 2 TABLETS LEFT OF THIS MEDICATION.     INTERFACE SENT REQUEST TO [facility] OFFICE.     PLEASE CALL TO LET PATIENT KNOW ONCE THIS REQUEST HAS BEEN COMPLETED.

## 2023-01-04 NOTE — TELEPHONE ENCOUNTER
Rx Refill Note  Requested Prescriptions     Pending Prescriptions Disp Refills   • losartan (COZAAR) 100 MG tablet [Pharmacy Med Name: LOSARTAN 100MG TABLETS] 90 tablet 1     Sig: TAKE 1 TABLET BY MOUTH DAILY      Last office visit with prescribing clinician: 5/26/2022   Last telemedicine visit with prescribing clinician: Visit date not found   Next office visit with prescribing clinician: Visit date not found     Would you like a call back once the refill request has been completed: [] Yes [] No    If the office needs to give you a call back, can they leave a voicemail: [] Yes [] No    Nadira Morales MA  01/04/23, 15:11 EST

## 2023-01-05 RX ORDER — LOSARTAN POTASSIUM 100 MG/1
100 TABLET ORAL DAILY
Qty: 90 TABLET | Refills: 1 | Status: SHIPPED | OUTPATIENT
Start: 2023-01-05

## 2023-02-01 ENCOUNTER — PATIENT MESSAGE (OUTPATIENT)
Dept: FAMILY MEDICINE CLINIC | Facility: CLINIC | Age: 73
End: 2023-02-01
Payer: MEDICARE

## 2023-02-21 RX ORDER — ATORVASTATIN CALCIUM 10 MG/1
10 TABLET, FILM COATED ORAL DAILY
Qty: 90 TABLET | Refills: 1 | OUTPATIENT
Start: 2023-02-21

## 2023-02-27 RX ORDER — CETIRIZINE HYDROCHLORIDE 10 MG/1
TABLET ORAL
Qty: 90 TABLET | Refills: 3 | OUTPATIENT
Start: 2023-02-27

## 2023-02-27 RX ORDER — ATORVASTATIN CALCIUM 10 MG/1
10 TABLET, FILM COATED ORAL DAILY
Qty: 90 TABLET | Refills: 1 | OUTPATIENT
Start: 2023-02-27

## 2023-02-27 RX ORDER — MONTELUKAST SODIUM 10 MG/1
10 TABLET ORAL
Qty: 90 TABLET | Refills: 3 | OUTPATIENT
Start: 2023-02-27

## 2023-02-28 DIAGNOSIS — E78.2 MIXED HYPERLIPIDEMIA: Primary | ICD-10-CM

## 2023-02-28 RX ORDER — ATORVASTATIN CALCIUM 10 MG/1
10 TABLET, FILM COATED ORAL DAILY
Qty: 90 TABLET | Refills: 1 | OUTPATIENT
Start: 2023-02-28

## 2023-02-28 RX ORDER — ATORVASTATIN CALCIUM 10 MG/1
10 TABLET, FILM COATED ORAL DAILY
Qty: 90 TABLET | Refills: 0 | Status: SHIPPED | OUTPATIENT
Start: 2023-02-28 | End: 2023-03-07 | Stop reason: SDUPTHER

## 2023-02-28 NOTE — TELEPHONE ENCOUNTER
Rx Refill Note  Requested Prescriptions     Pending Prescriptions Disp Refills    atorvastatin (LIPITOR) 10 MG tablet 90 tablet 1     Sig: Take 1 tablet by mouth Daily.      Last office visit with prescribing clinician: 5/26/2022   Last telemedicine visit with prescribing clinician: Visit date not found   Next office visit with prescribing clinician: Visit date not found                         Would you like a call back once the refill request has been completed: [] Yes [] No    If the office needs to give you a call back, can they leave a voicemail: [] Yes [] No    Nadira Morales MA  02/28/23, 13:00 EST

## 2023-03-07 ENCOUNTER — OFFICE VISIT (OUTPATIENT)
Dept: FAMILY MEDICINE CLINIC | Facility: CLINIC | Age: 73
End: 2023-03-07
Payer: MEDICARE

## 2023-03-07 VITALS
OXYGEN SATURATION: 97 % | BODY MASS INDEX: 26.91 KG/M2 | HEART RATE: 93 BPM | WEIGHT: 157.6 LBS | TEMPERATURE: 98.4 F | HEIGHT: 64 IN | DIASTOLIC BLOOD PRESSURE: 77 MMHG | SYSTOLIC BLOOD PRESSURE: 142 MMHG

## 2023-03-07 DIAGNOSIS — F51.04 CHRONIC INSOMNIA: ICD-10-CM

## 2023-03-07 DIAGNOSIS — E55.9 VITAMIN D DEFICIENCY: ICD-10-CM

## 2023-03-07 DIAGNOSIS — Z85.828 HISTORY OF SKIN CANCER: ICD-10-CM

## 2023-03-07 DIAGNOSIS — E66.3 OVERWEIGHT WITH BODY MASS INDEX (BMI) OF 27 TO 27.9 IN ADULT: ICD-10-CM

## 2023-03-07 DIAGNOSIS — M85.89 OSTEOPENIA OF MULTIPLE SITES: ICD-10-CM

## 2023-03-07 DIAGNOSIS — F41.8 MIXED ANXIETY DEPRESSIVE DISORDER: ICD-10-CM

## 2023-03-07 DIAGNOSIS — Z17.0: ICD-10-CM

## 2023-03-07 DIAGNOSIS — E78.2 MIXED HYPERLIPIDEMIA: ICD-10-CM

## 2023-03-07 DIAGNOSIS — R73.03 PREDIABETES: ICD-10-CM

## 2023-03-07 DIAGNOSIS — M81.0 OSTEOPOROSIS, UNSPECIFIED OSTEOPOROSIS TYPE, UNSPECIFIED PATHOLOGICAL FRACTURE PRESENCE: ICD-10-CM

## 2023-03-07 DIAGNOSIS — I10 PRIMARY HYPERTENSION: ICD-10-CM

## 2023-03-07 DIAGNOSIS — C50.911: ICD-10-CM

## 2023-03-07 DIAGNOSIS — Z00.01 ENCOUNTER FOR ANNUAL GENERAL MEDICAL EXAMINATION WITH ABNORMAL FINDINGS IN ADULT: Primary | ICD-10-CM

## 2023-03-07 DIAGNOSIS — R10.9 FLANK PAIN: ICD-10-CM

## 2023-03-07 DIAGNOSIS — R53.83 OTHER FATIGUE: ICD-10-CM

## 2023-03-07 PROCEDURE — 99213 OFFICE O/P EST LOW 20 MIN: CPT | Performed by: PREVENTIVE MEDICINE

## 2023-03-07 PROCEDURE — G0439 PPPS, SUBSEQ VISIT: HCPCS | Performed by: PREVENTIVE MEDICINE

## 2023-03-07 PROCEDURE — 1170F FXNL STATUS ASSESSED: CPT | Performed by: PREVENTIVE MEDICINE

## 2023-03-07 PROCEDURE — 1159F MED LIST DOCD IN RCRD: CPT | Performed by: PREVENTIVE MEDICINE

## 2023-03-07 RX ORDER — CETIRIZINE HYDROCHLORIDE 10 MG/1
10 TABLET ORAL DAILY
Qty: 90 TABLET | Refills: 3 | Status: SHIPPED | OUTPATIENT
Start: 2023-03-07

## 2023-03-07 RX ORDER — MONTELUKAST SODIUM 10 MG/1
10 TABLET ORAL
Qty: 90 TABLET | Refills: 3 | Status: SHIPPED | OUTPATIENT
Start: 2023-03-07

## 2023-03-07 RX ORDER — ATORVASTATIN CALCIUM 10 MG/1
10 TABLET, FILM COATED ORAL DAILY
Qty: 90 TABLET | Refills: 1 | Status: SHIPPED | OUTPATIENT
Start: 2023-03-07

## 2023-03-07 NOTE — PATIENT INSTRUCTIONS
Health Maintenance Due   Topic Date Due    ZOSTER VACCINE (1 of 2) Never done    ANNUAL WELLNESS VISIT  07/01/2021    COVID-19 Vaccine (4 - Booster for Pfizer series) 02/07/2022    INFLUENZA VACCINE  08/01/2022    LIPID PANEL  11/08/2022    Please send copy of Advanced directive.  Please ask Dr. Stockton to get VitaminD with next labs    12 hour fast for labs

## 2023-03-07 NOTE — PROGRESS NOTES
The ABCs of the Annual Wellness Visit  Subsequent Medicare Wellness Visit    Subjective    History of Present Illness:  Elizabeth Momin is a 73 y.o. female who presents for a Subsequent Medicare Wellness Visit.      Elizabeth Momin is here for her annual wellness exam and also to check on multiple chronic health, conditions to include hyperlipidemia, hypertension, mixed anxiety, and depression, osteopenia, stage II, lobular carcinoma of the breast, ER positive, right. Prediabetes, history of skin cancer, insomnia, flank pain, vitamin D and she is overweight, so we will discuss watching portion size and increase walking.    The patient reports having fatigue and is presuming it is due to having radiation for her cancer. She states she does get tired when she works or volunteers. She notes she is sleeping okay and taking half a pill of amitriptyline. She also states she takes Tylenol PM occasionally and herbs  but not together with the amitriptyline. She reports that melatonin makes her have bad dreams, so will not take it. She states her mood and anxiety are fine.    The patient reports she is watching her saturated fats. She states she ran out of the cholesterol medication and needs a refill.    The patient states having knee pain, and it worse since last year. She reports taking 2 Advil daily.  The patient notes her mental health is the same as last year.  The patient denies hospitalization in the past year.  The patient reports having a advance directive. and will provide us a copy.  The patient reports exercising 20 minutes a day.  The patient states having a bone density scan done, and ordered by Dr. Eugene Shelley. She has a follow up appointment with him 5/2023 and will receive a calcium injection and Reclast.  Last mammogram was 2022.  The patient is to do a follow-up this year with dermatology. She would like a referral to Dr. Saini.  Patient has seen ophthalmology in the past year.  The following  portions of the patient's history were reviewed and   updated as appropriate: allergies, current medications, past family history, past medical history, past social history, past surgical history and problem list.    Compared to one year ago, the patient feels her physical   health is worse.    Compared to one year ago, the patient feels her mental   health is the same.    Recent Hospitalizations:  She was not admitted to the hospital during the last year.       Current Medical Providers:  Patient Care Team:  Lise Bryant MD as PCP - General (Family Medicine)  Eugene Stockton MD as Consulting Physician (Hematology and Oncology)  Jevon López MD as Consulting Physician (Radiation Oncology)    Outpatient Medications Prior to Visit   Medication Sig Dispense Refill   • acetaminophen (TYLENOL) 650 MG 8 hr tablet Take 1 tablet by mouth.     • amitriptyline (ELAVIL) 50 MG tablet Take 1 tablet by mouth Every Night. 90 tablet 1   • Biotin 10 MG tablet Take 1 tablet by mouth Daily.     • Calcium Carb-Cholecalciferol (CALCIUM-VITAMIN D) 600-400 MG-UNIT tablet Take 1 tablet by mouth.     • folic acid (FOLVITE) 400 MCG tablet Take 1 tablet by mouth Daily.     • glucosamine-chondroitin 500-400 MG capsule capsule Take  by mouth 3 (Three) Times a Day With Meals.     • ibuprofen (ADVIL,MOTRIN) 800 MG tablet Take 1 tablet by mouth Every 6 (Six) Hours As Needed.     • letrozole (FEMARA) 2.5 MG tablet Take 1 tablet by mouth Daily.     • losartan (COZAAR) 100 MG tablet TAKE 1 TABLET BY MOUTH DAILY 90 tablet 1   • Multiple Vitamin (MULTI-VITAMIN) tablet Take 1 tablet by mouth Daily.     • nitrofurantoin, macrocrystal-monohydrate, (Macrobid) 100 MG capsule Take 1 capsule by mouth 2 (Two) Times a Day. 20 capsule 0   • atorvastatin (LIPITOR) 10 MG tablet Take 1 tablet by mouth Daily. 90 tablet 0   • cetirizine (zyrTEC) 10 MG tablet Take 1 tablet by mouth Daily. 90 tablet 3   • montelukast (SINGULAIR) 10 MG tablet Take 1 tablet  "by mouth every night at bedtime. 90 tablet 3     No facility-administered medications prior to visit.       No opioid medication identified on active medication list. I have reviewed chart for other potential  high risk medication/s and harmful drug interactions in the elderly.          Aspirin is not on active medication list.  Aspirin use is not indicated based on review of current medical condition/s. Risk of harm outweighs potential benefits.  .  Patient Active Problem List   Diagnosis   • Allergic rhinitis   • Hyperlipidemia   • Hypertension   • Chronic insomnia   • Mixed anxiety depressive disorder   • Osteopenia of multiple sites   • Overweight with body mass index (BMI) of 27 to 27.9 in adult   • Chronic sinusitis   • Osteoarthritis   • S/P right mastectomy   • Stage II lobular carcinoma of breast, ER+, right (HCC)   • Prediabetes   • Elevated liver enzymes   • Flank pain   • History of skin cancer     Advance Care Planning  Advance Directive is not on file.  ACP discussion was held with the patient during this visit. Patient has an advance directive (not in EMR), copy requested.     Objective    Vitals:    03/07/23 1416   BP: 142/77   BP Location: Left arm   Patient Position: Sitting   Cuff Size: Adult   Pulse: 93   Temp: 98.4 °F (36.9 °C)   TempSrc: Tympanic   SpO2: 97%   Weight: 71.5 kg (157 lb 9.6 oz)   Height: 161.3 cm (63.5\")     Estimated body mass index is 27.48 kg/m² as calculated from the following:    Height as of this encounter: 161.3 cm (63.5\").    Weight as of this encounter: 71.5 kg (157 lb 9.6 oz).    BMI is >= 25 and <30. (Overweight) The following options were offered after discussion;: exercise counseling/recommendations and nutrition counseling/recommendations      Does the patient have evidence of cognitive impairment? No          HEALTH RISK ASSESSMENT    Smoking Status:  Social History     Tobacco Use   Smoking Status Never   Smokeless Tobacco Never   Tobacco Comments    Passive Smoke " Exposure: No      Alcohol Consumption:  Social History     Substance and Sexual Activity   Alcohol Use No     Fall Risk Screen:    DIANEADI Fall Risk Assessment was completed, and patient is at LOW risk for falls.Assessment completed on:3/7/2023    Depression Screening:  PHQ-2/PHQ-9 Depression Screening 3/7/2023   Retired PHQ-9 Total Score -   Retired Total Score -   Little Interest or Pleasure in Doing Things 0-->not at all   Feeling Down, Depressed or Hopeless 0-->not at all   PHQ-9: Brief Depression Severity Measure Score 0       Health Habits and Functional and Cognitive Screening:  Functional & Cognitive Status 3/7/2023   Do you have difficulty preparing food and eating? No   Do you have difficulty bathing yourself, getting dressed or grooming yourself? No   Do you have difficulty using the toilet? No   Do you have difficulty moving around from place to place? No   Do you have trouble with steps or getting out of a bed or a chair? No   Current Diet Well Balanced Diet   Dental Exam Up to date   Eye Exam Up to date   Exercise (times per week) 0 times per week   Current Exercises Include No Regular Exercise   Current Exercise Activities Include -   Do you need help using the phone?  No   Are you deaf or do you have serious difficulty hearing?  No   Do you need help with transportation? No   Do you need help shopping? No   Do you need help preparing meals?  No   Do you need help with housework?  No   Do you need help with laundry? No   Do you need help taking your medications? No   Do you need help managing money? No   Do you ever drive or ride in a car without wearing a seat belt? No   Have you felt unusual stress, anger or loneliness in the last month? No   Who do you live with? Spouse   If you need help, do you have trouble finding someone available to you? No   Have you been bothered in the last four weeks by sexual problems? No   Do you have difficulty concentrating, remembering or making decisions? Yes        Age-appropriate Screening Schedule:  Refer to the list below for future screening recommendations based on patient's age, sex and/or medical conditions. Orders for these recommended tests are listed in the plan section. The patient has been provided with a written plan.    Health Maintenance   Topic Date Due   • ZOSTER VACCINE (1 of 2) Never done   • COVID-19 Vaccine (4 - Booster for Pfizer series) 02/07/2022   • INFLUENZA VACCINE  08/01/2022   • LIPID PANEL  11/08/2022   • COLORECTAL CANCER SCREENING  08/12/2023   • MAMMOGRAM  11/16/2023   • ANNUAL WELLNESS VISIT  03/07/2024   • DXA SCAN  07/06/2024   • TDAP/TD VACCINES (3 - Td or Tdap) 08/02/2028   • HEPATITIS C SCREENING  Completed   • Pneumococcal Vaccine 65+  Completed                CMS Preventative Services Quick Reference  Risk Factors Identified During Encounter  None Identified  The above risks/problems have been discussed with the patient.  Follow up actions/plans if indicated are seen below in the Assessment/Plan Section.  Pertinent information has been shared with the patient in the After Visit Summary.  An After Visit Summary and PPPS were made available to the patient.    Follow Up:   Next Medicare Wellness visit to be scheduled in 1 year.         Additional E&M Note during same encounter follows:  Patient has multiple medical problems which are significant and separately identifiable that require additional work above and beyond the Medicare Wellness Visit.        Chief Complaint  Medicare Wellness-subsequent    Subjective        HPI  Elizabeth Momin also presents   Chief Complaint   Patient presents with   • Medicare Wellness-subsequent   .  Elizabeth Mmoin is here for her annual wellness exam and also to check on multiple chronic health, conditions to include hyperlipidemia, hypertension, mixed anxiety, and depression, osteopenia, stage II, lobular carcinoma of the breast, ER positive, right. Prediabetes, history of skin cancer, insomnia,  "flank pain, vitamin D and she is overweight, so we will discuss watching portion size and increase walking.    The patient reports having fatigue and is presuming it is due to having radiation for her cancer. She states she does get tired when she works or volunteers. She notes she is sleeping okay and taking half a pill of amitriptyline. She also states she takes Tylenol PM occasionally and herbs  but not together with the amitriptyline. She reports that melatonin makes her have bad dreams, so will not take it. She states her mood and anxiety are fine.    The patient reports she is watching her saturated fats. She states she ran out of the cholesterol medication and needs a refill.    The patient states having knee pain, and it worse since last year. She reports taking 2 Advil daily.  The patient notes her mental health is the same as last year.  The patient denies hospitalization in the past year.  The patient reports having a advance directive. and will provide us a copy.  The patient reports exercising 20 minutes a day.  The patient states having a bone density scan done, and ordered by Dr. Eugene Shelley. She has a follow up appointment with him 5/2023 and will receive a calcium injection and Reclast.  Last mammogram was 2022.  The patient is to do a follow-up this year with dermatology. She would like a referral to Dr. Saini.  Patient has seen ophthalmology in the past year.         Objective   Vital Signs:  /77 (BP Location: Left arm, Patient Position: Sitting, Cuff Size: Adult)   Pulse 93   Temp 98.4 °F (36.9 °C) (Tympanic)   Ht 161.3 cm (63.5\")   Wt 71.5 kg (157 lb 9.6 oz)   SpO2 97%   BMI 27.48 kg/m²     Physical Exam  HENT:      Right Ear: Tympanic membrane normal.      Left Ear: Tympanic membrane normal.      Ears:      Comments: both tympanic membranes are normal.        Mouth/Throat:      Mouth: Mucous membranes are moist.      Pharynx: Oropharynx is clear.   Eyes:      Pupils: Pupils " are equal, round, and reactive to light.   Neck:      Vascular: No carotid bruit.      Comments: Thyroid is normal and there is no cervical adenopathy.  Cardiovascular:      Rate and Rhythm: Normal rate and regular rhythm.      Heart sounds: No murmur heard.  Pulmonary:      Effort: Pulmonary effort is normal.      Breath sounds: Normal breath sounds. No wheezing or rhonchi.   Abdominal:      Comments: Bowel sounds were normal active and there was no masses or organomegaly in her abdomen.    Musculoskeletal:      Comments: No pretibial edema.                           Assessment and Plan   Diagnoses and all orders for this visit:    1. Encounter for annual general medical examination with abnormal findings in adult (Primary)    2. Mixed hyperlipidemia  -     Lipid Panel; Future  -     atorvastatin (LIPITOR) 10 MG tablet; Take 1 tablet by mouth Daily.  Dispense: 90 tablet; Refill: 1    3. Primary hypertension  Comments:  fair control-will home monitor  Orders:  -     CBC Auto Differential; Future  -     Comprehensive Metabolic Panel; Future    4. Mixed anxiety depressive disorder  -     Magnesium; Future  -     Vitamin B12; Future    5. Osteopenia of multiple sites    6. Stage II lobular carcinoma of breast, ER+, right (HCC)  -     Vitamin D,25-Hydroxy; Future    7. Prediabetes  -     Hemoglobin A1c; Future    8. History of skin cancer  -     Ambulatory Referral to Dermatology    9. Chronic insomnia    10. Flank pain    11. Vitamin D deficiency  -     TSH; Future    12. Overweight with body mass index (BMI) of 27 to 27.9 in adult  Assessment & Plan:  Patient's (Body mass index is 27.48 kg/m².) indicates that they are overweight with health conditions that include diabetes mellitus and dyslipidemias . Weight is newly identified. BMI is is above average; BMI management plan is completed. We discussed portion control and increasing exercise.       13. Other fatigue  -     TSH; Future    14. Osteoporosis, unspecified  osteoporosis type, unspecified pathological fracture presence  -     Vitamin D,25-Hydroxy; Future    Other orders  -     cetirizine (zyrTEC) 10 MG tablet; Take 1 tablet by mouth Daily.  Dispense: 90 tablet; Refill: 3  -     montelukast (SINGULAIR) 10 MG tablet; Take 1 tablet by mouth every night at bedtime.  Dispense: 90 tablet; Refill: 3           Follow Up   Return in about 6 months (around 9/7/2023).  Patient was given instructions and counseling regarding her condition or for health maintenance advice. Please see specific information pulled into the AVS if appropriate.   Transcribed from ambient dictation for Lise Bryant MD by Claudette Villafana.  03/07/23   17:34 EST    Patient or patient representative verbalized consent to the visit recording.  I have personally performed the services described in this document as transcribed by the above individual, and it is both accurate and complete.

## 2023-03-07 NOTE — ASSESSMENT & PLAN NOTE
Patient's (Body mass index is 27.48 kg/m².) indicates that they are overweight with health conditions that include diabetes mellitus and dyslipidemias . Weight is newly identified. BMI is is above average; BMI management plan is completed. We discussed portion control and increasing exercise.

## 2023-03-08 ENCOUNTER — CLINICAL SUPPORT (OUTPATIENT)
Dept: FAMILY MEDICINE CLINIC | Facility: CLINIC | Age: 73
End: 2023-03-08
Payer: MEDICARE

## 2023-03-08 DIAGNOSIS — M81.0 OSTEOPOROSIS, UNSPECIFIED OSTEOPOROSIS TYPE, UNSPECIFIED PATHOLOGICAL FRACTURE PRESENCE: ICD-10-CM

## 2023-03-08 DIAGNOSIS — R53.83 OTHER FATIGUE: ICD-10-CM

## 2023-03-08 DIAGNOSIS — E78.2 MIXED HYPERLIPIDEMIA: ICD-10-CM

## 2023-03-08 DIAGNOSIS — R73.03 PREDIABETES: ICD-10-CM

## 2023-03-08 DIAGNOSIS — C50.911: ICD-10-CM

## 2023-03-08 DIAGNOSIS — I10 PRIMARY HYPERTENSION: ICD-10-CM

## 2023-03-08 DIAGNOSIS — Z17.0: ICD-10-CM

## 2023-03-08 DIAGNOSIS — E55.9 VITAMIN D DEFICIENCY: ICD-10-CM

## 2023-03-08 DIAGNOSIS — F41.8 MIXED ANXIETY DEPRESSIVE DISORDER: ICD-10-CM

## 2023-03-08 LAB
25(OH)D3 SERPL-MCNC: 34.7 NG/ML (ref 30–100)
ALBUMIN SERPL-MCNC: 4.6 G/DL (ref 3.5–5.2)
ALBUMIN/GLOB SERPL: 1.6 G/DL
ALP SERPL-CCNC: 44 U/L (ref 39–117)
ALT SERPL W P-5'-P-CCNC: 81 U/L (ref 1–33)
ANION GAP SERPL CALCULATED.3IONS-SCNC: 10.4 MMOL/L (ref 5–15)
AST SERPL-CCNC: 71 U/L (ref 1–32)
BASOPHILS # BLD AUTO: 0.04 10*3/MM3 (ref 0–0.2)
BASOPHILS NFR BLD AUTO: 0.8 % (ref 0–1.5)
BILIRUB SERPL-MCNC: 0.5 MG/DL (ref 0–1.2)
BUN SERPL-MCNC: 13 MG/DL (ref 8–23)
BUN/CREAT SERPL: 12.5 (ref 7–25)
CALCIUM SPEC-SCNC: 10 MG/DL (ref 8.6–10.5)
CHLORIDE SERPL-SCNC: 107 MMOL/L (ref 98–107)
CHOLEST SERPL-MCNC: 220 MG/DL (ref 0–200)
CO2 SERPL-SCNC: 25.6 MMOL/L (ref 22–29)
CREAT SERPL-MCNC: 1.04 MG/DL (ref 0.57–1)
DEPRECATED RDW RBC AUTO: 45.9 FL (ref 37–54)
EGFRCR SERPLBLD CKD-EPI 2021: 56.5 ML/MIN/1.73
EOSINOPHIL # BLD AUTO: 0.17 10*3/MM3 (ref 0–0.4)
EOSINOPHIL NFR BLD AUTO: 3.3 % (ref 0.3–6.2)
ERYTHROCYTE [DISTWIDTH] IN BLOOD BY AUTOMATED COUNT: 12.9 % (ref 12.3–15.4)
GLOBULIN UR ELPH-MCNC: 2.9 GM/DL
GLUCOSE SERPL-MCNC: 108 MG/DL (ref 65–99)
HBA1C MFR BLD: 6.5 % (ref 4.8–5.6)
HCT VFR BLD AUTO: 38.6 % (ref 34–46.6)
HDLC SERPL-MCNC: 37 MG/DL (ref 40–60)
HGB BLD-MCNC: 12.9 G/DL (ref 12–15.9)
IMM GRANULOCYTES # BLD AUTO: 0.03 10*3/MM3 (ref 0–0.05)
IMM GRANULOCYTES NFR BLD AUTO: 0.6 % (ref 0–0.5)
LDLC SERPL CALC-MCNC: 133 MG/DL (ref 0–100)
LDLC/HDLC SERPL: 3.45 {RATIO}
LYMPHOCYTES # BLD AUTO: 2.5 10*3/MM3 (ref 0.7–3.1)
LYMPHOCYTES NFR BLD AUTO: 47.9 % (ref 19.6–45.3)
MAGNESIUM SERPL-MCNC: 2 MG/DL (ref 1.6–2.4)
MCH RBC QN AUTO: 32.3 PG (ref 26.6–33)
MCHC RBC AUTO-ENTMCNC: 33.4 G/DL (ref 31.5–35.7)
MCV RBC AUTO: 96.7 FL (ref 79–97)
MONOCYTES # BLD AUTO: 0.57 10*3/MM3 (ref 0.1–0.9)
MONOCYTES NFR BLD AUTO: 10.9 % (ref 5–12)
NEUTROPHILS NFR BLD AUTO: 1.91 10*3/MM3 (ref 1.7–7)
NEUTROPHILS NFR BLD AUTO: 36.5 % (ref 42.7–76)
NRBC BLD AUTO-RTO: 0 /100 WBC (ref 0–0.2)
PLATELET # BLD AUTO: 230 10*3/MM3 (ref 140–450)
PMV BLD AUTO: 9.9 FL (ref 6–12)
POTASSIUM SERPL-SCNC: 4.3 MMOL/L (ref 3.5–5.2)
PROT SERPL-MCNC: 7.5 G/DL (ref 6–8.5)
RBC # BLD AUTO: 3.99 10*6/MM3 (ref 3.77–5.28)
SODIUM SERPL-SCNC: 143 MMOL/L (ref 136–145)
TRIGL SERPL-MCNC: 276 MG/DL (ref 0–150)
TSH SERPL DL<=0.05 MIU/L-ACNC: 2.11 UIU/ML (ref 0.27–4.2)
VIT B12 BLD-MCNC: 699 PG/ML (ref 211–946)
VLDLC SERPL-MCNC: 50 MG/DL (ref 5–40)
WBC NRBC COR # BLD: 5.22 10*3/MM3 (ref 3.4–10.8)

## 2023-03-08 PROCEDURE — 82607 VITAMIN B-12: CPT | Performed by: PREVENTIVE MEDICINE

## 2023-03-08 PROCEDURE — 84443 ASSAY THYROID STIM HORMONE: CPT | Performed by: PREVENTIVE MEDICINE

## 2023-03-08 PROCEDURE — 83036 HEMOGLOBIN GLYCOSYLATED A1C: CPT | Performed by: PREVENTIVE MEDICINE

## 2023-03-08 PROCEDURE — 80053 COMPREHEN METABOLIC PANEL: CPT | Performed by: PREVENTIVE MEDICINE

## 2023-03-08 PROCEDURE — 36415 COLL VENOUS BLD VENIPUNCTURE: CPT | Performed by: PREVENTIVE MEDICINE

## 2023-03-08 PROCEDURE — 83735 ASSAY OF MAGNESIUM: CPT | Performed by: PREVENTIVE MEDICINE

## 2023-03-08 PROCEDURE — 85025 COMPLETE CBC W/AUTO DIFF WBC: CPT | Performed by: PREVENTIVE MEDICINE

## 2023-03-08 PROCEDURE — 80061 LIPID PANEL: CPT | Performed by: PREVENTIVE MEDICINE

## 2023-03-08 PROCEDURE — 82306 VITAMIN D 25 HYDROXY: CPT | Performed by: PREVENTIVE MEDICINE

## 2023-03-08 NOTE — PROGRESS NOTES
Venipuncture performed on Left Arm by Gayle Salas MA  with good hemostasis. Patient tolerated well. 03/08/23   Lise Bryant MD

## 2023-03-09 ENCOUNTER — TELEPHONE (OUTPATIENT)
Dept: FAMILY MEDICINE CLINIC | Facility: CLINIC | Age: 73
End: 2023-03-09
Payer: MEDICARE

## 2023-03-09 DIAGNOSIS — R74.8 ELEVATED LIVER ENZYMES: Primary | ICD-10-CM

## 2023-03-09 NOTE — TELEPHONE ENCOUNTER
Patient does not want to start any diabetes meds, education or checking her sugars.  She will try better with diet and exercise and scheduled lab follow up in 4-6 weeks.

## 2023-03-09 NOTE — PROGRESS NOTES
1 liver function is elevated to 3 times normal and the other 1 2 times normal.  If you are taking ibuprofen and Tylenol daily I would certainly taper off of these medicines and get a repeat of the liver functions in 4 to 6 weeks.  Blood sugar was 108 and A1c puts her into the diabetic range at 6.5.  Would she be willing to consider starting some medication for diabetes and checking her blood sugars regularly and also going to diabetic education?  Kidney function is slightly low so again avoid ibuprofen Aleve Motrin and limit x-ray dyes finally bad cholesterol is 133 and should be below 100 total cholesterol was 220 and should be below 200.  I hate to increase the atorvastatin until we further define her liver function.  Please let her oncologist know about these labs they should be able to view on epic call with any other questions and concerns and we will see you back again for fasting labs in 4 to 6 weeks

## 2023-03-09 NOTE — TELEPHONE ENCOUNTER
HUB TO READ:  ----- Message from Lise Bryant MD sent at 3/9/2023  8:02 AM EST -----  1 liver function is elevated to 3 times normal and the other 1 2 times normal.  If you are taking ibuprofen and Tylenol daily I would certainly taper off of these medicines and get a repeat of the liver functions in 4 to 6 weeks.  Blood sugar was 108 and A1c puts her into the diabetic range at 6.5.  Would she be willing to consider starting some medication for diabetes and checking her blood sugars regularly and also going to diabetic education?  Kidney function is slightly low so again avoid ibuprofen Aleve Motrin and limit x-ray dyes finally bad cholesterol is 133 and should be below 100 total cholesterol was 220 and should be below 200.  I hate to increase the atorvastatin until we further define her liver function.  Please let her oncologist know about these labs they should be able to view on epic call with any other questions and concerns and we will see you back again for fasting labs in 4 to 6 weeks

## 2023-04-14 ENCOUNTER — TELEPHONE (OUTPATIENT)
Dept: FAMILY MEDICINE CLINIC | Facility: CLINIC | Age: 73
End: 2023-04-14
Payer: MEDICARE

## 2023-04-14 NOTE — TELEPHONE ENCOUNTER
----- Message from Linda Holt sent at 4/13/2023  9:24 AM EDT -----      ----- Message -----  From: Linda Holt  Sent: 11/18/2022   8:31 AM EDT  To: Linda Holt        ----- Message -----  From: SYSTEM  Sent: 10/12/2022   1:07 AM EST  To: [facility]

## 2023-04-19 ENCOUNTER — CLINICAL SUPPORT (OUTPATIENT)
Dept: FAMILY MEDICINE CLINIC | Facility: CLINIC | Age: 73
End: 2023-04-19
Payer: MEDICARE

## 2023-04-19 ENCOUNTER — TELEPHONE (OUTPATIENT)
Dept: FAMILY MEDICINE CLINIC | Facility: CLINIC | Age: 73
End: 2023-04-19
Payer: MEDICARE

## 2023-04-19 DIAGNOSIS — R74.8 ELEVATED LIVER ENZYMES: ICD-10-CM

## 2023-04-19 LAB
ALBUMIN SERPL-MCNC: 4.3 G/DL (ref 3.5–5.2)
ALBUMIN/GLOB SERPL: 1.3 G/DL
ALP SERPL-CCNC: 44 U/L (ref 39–117)
ALT SERPL W P-5'-P-CCNC: 61 U/L (ref 1–33)
ANION GAP SERPL CALCULATED.3IONS-SCNC: 10.8 MMOL/L (ref 5–15)
AST SERPL-CCNC: 63 U/L (ref 1–32)
BILIRUB SERPL-MCNC: 0.7 MG/DL (ref 0–1.2)
BUN SERPL-MCNC: 13 MG/DL (ref 8–23)
BUN/CREAT SERPL: 12.3 (ref 7–25)
CALCIUM SPEC-SCNC: 10.4 MG/DL (ref 8.6–10.5)
CHLORIDE SERPL-SCNC: 103 MMOL/L (ref 98–107)
CO2 SERPL-SCNC: 25.2 MMOL/L (ref 22–29)
CREAT SERPL-MCNC: 1.06 MG/DL (ref 0.57–1)
EGFRCR SERPLBLD CKD-EPI 2021: 55.2 ML/MIN/1.73
GLOBULIN UR ELPH-MCNC: 3.2 GM/DL
GLUCOSE SERPL-MCNC: 110 MG/DL (ref 65–99)
POTASSIUM SERPL-SCNC: 4.1 MMOL/L (ref 3.5–5.2)
PROT SERPL-MCNC: 7.5 G/DL (ref 6–8.5)
SODIUM SERPL-SCNC: 139 MMOL/L (ref 136–145)

## 2023-04-19 PROCEDURE — 80053 COMPREHEN METABOLIC PANEL: CPT | Performed by: PREVENTIVE MEDICINE

## 2023-04-19 NOTE — PROGRESS NOTES
Venipuncture performed on Left Arm by Gayle Salas MA  with good hemostasis. Patient tolerated well. 04/19/23   Lise Bryant MD

## 2023-04-19 NOTE — TELEPHONE ENCOUNTER
Pt prefers to have the Dexa Scan ordered by her Oncologist from [facility]. Order cancelled.

## 2023-04-20 ENCOUNTER — TELEPHONE (OUTPATIENT)
Dept: FAMILY MEDICINE CLINIC | Facility: CLINIC | Age: 73
End: 2023-04-20
Payer: MEDICARE

## 2023-04-20 DIAGNOSIS — R74.01 ELEVATED TRANSAMINASE LEVEL: Primary | ICD-10-CM

## 2023-04-20 NOTE — TELEPHONE ENCOUNTER
HUB TO READ:  ----- Message from Lise Bryant MD sent at 4/20/2023  7:30 AM EDT -----  Liver functions are still twice normal.  They have improved but would still consider stopping Tylenol and ibuprofen.  If she needs something to help with discomfort we can refer her instead to pain management or she could consider taking infrequent tramadol please let me know.  Glucose is elevated to 110 so watch carbs and increase walking kidney function has also decreased so that may help if she stops taking the ibuprofen.  We should recheck again in 6 weeks as we may have to stop the atorvastatin as well please call if any other questions or concerns

## 2023-04-20 NOTE — PROGRESS NOTES
Liver functions are still twice normal.  They have improved but would still consider stopping Tylenol and ibuprofen.  If she needs something to help with discomfort we can refer her instead to pain management or she could consider taking infrequent tramadol please let me know.  Glucose is elevated to 110 so watch carbs and increase walking kidney function has also decreased so that may help if she stops taking the ibuprofen.  We should recheck again in 6 weeks as we may have to stop the atorvastatin as well please call if any other questions or concerns

## 2023-05-09 ENCOUNTER — TELEPHONE (OUTPATIENT)
Dept: FAMILY MEDICINE CLINIC | Facility: CLINIC | Age: 73
End: 2023-05-09
Payer: MEDICARE

## 2023-05-09 NOTE — TELEPHONE ENCOUNTER
Please advise recheck if cough not improved that caused her to go to after hour clinic at [facility]

## 2023-05-19 ENCOUNTER — TRANSCRIBE ORDERS (OUTPATIENT)
Dept: ADMINISTRATIVE | Facility: HOSPITAL | Age: 73
End: 2023-05-19
Payer: MEDICARE

## 2023-05-19 DIAGNOSIS — R74.8 ELEVATED LIVER ENZYMES: Primary | ICD-10-CM

## 2023-06-01 ENCOUNTER — HOSPITAL ENCOUNTER (OUTPATIENT)
Dept: ULTRASOUND IMAGING | Facility: HOSPITAL | Age: 73
Discharge: HOME | End: 2023-06-01
Admitting: NURSE PRACTITIONER

## 2023-06-01 DIAGNOSIS — R74.8 ELEVATED LIVER ENZYMES: ICD-10-CM

## 2023-06-01 PROCEDURE — 76705 ECHO EXAM OF ABDOMEN: CPT

## 2023-06-14 ENCOUNTER — TELEPHONE (OUTPATIENT)
Dept: FAMILY MEDICINE CLINIC | Facility: CLINIC | Age: 73
End: 2023-06-14
Payer: MEDICARE

## 2023-06-14 NOTE — LETTER
Karli 15, 2023    Elizabeth Momin  [address] [place] [id number]              Dr. Bryant wanted your CMP repeated in 6 weeks. It is now time can we schedule you a time to come in and get this done on a Wednesday or a Friday?                   Lise Bryant MD

## 2023-06-19 ENCOUNTER — TELEPHONE (OUTPATIENT)
Dept: FAMILY MEDICINE CLINIC | Facility: CLINIC | Age: 73
End: 2023-06-19
Payer: MEDICARE

## 2023-06-19 NOTE — TELEPHONE ENCOUNTER
Please radha patient and advise recheck this week with Nathalie for elevated liver functions, and doxycycline for infected tick bite.  May need referral to [facility] for elevated liver functions.  Avoid Ibuprofen and Tylenol unless fever is elevated and limit alcohol
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
## 2023-06-26 PROBLEM — B02.8 HERPES ZOSTER WITH COMPLICATION: Status: ACTIVE | Noted: 2023-06-26

## 2023-06-26 PROBLEM — S00.06XA TICK BITE OF SCALP: Status: ACTIVE | Noted: 2023-06-26

## 2023-09-01 NOTE — PATIENT INSTRUCTIONS
Health Maintenance Due   Topic Date Due    ZOSTER VACCINE (1 of 2) Never done    COVID-19 Vaccine (4 - Pfizer series) 02/07/2022    COLORECTAL CANCER SCREENING  08/12/2023      You are due for Shingrix vaccination series ( the newest shingles vaccine).  It is a two shot series spaced 2-6 months apart. Please get this vaccine series started at your earliest convenience at your local pharmacy to help avoid shingles outbreak. It is more effective than the old Zostavax vaccine and is recommended even if you have had the Zostavax vaccine in the past.  Once the Shingrix series is completed, it does not need to be repeated.   For more information, please look at the website below:  [facility] Shingrix Vaccine Information    You are due for Shingrix vaccination series ( the newest shingles vaccine).  It is a two shot series spaced 2-6 months apart. Please get this vaccine series started at your earliest convenience at your local pharmacy to help avoid shingles outbreak. It is more effective than the old Zostavax vaccine and is recommended even if you have had the Zostavax vaccine in the past.  Once the Shingrix series is completed, it does not need to be repeated.   For more information, please look at the website below:  [facility] Shingrix Vaccine Information      12 hour fast for labs over next 2-3 weeks    Check blood pressure cuff for accuracy and send 10 blood pressures over 2 weeks.  Watch sodium, alcohol and weight       Consider flu, Covid, RSV, and shingles in October

## 2023-09-07 ENCOUNTER — OFFICE VISIT (OUTPATIENT)
Dept: FAMILY MEDICINE CLINIC | Facility: CLINIC | Age: 73
End: 2023-09-07
Payer: MEDICARE

## 2023-09-07 VITALS
WEIGHT: 152.4 LBS | BODY MASS INDEX: 26.02 KG/M2 | HEIGHT: 64 IN | OXYGEN SATURATION: 98 % | SYSTOLIC BLOOD PRESSURE: 150 MMHG | TEMPERATURE: 98.2 F | HEART RATE: 79 BPM | DIASTOLIC BLOOD PRESSURE: 84 MMHG
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
## 2023-09-07 DIAGNOSIS — Z85.828 HISTORY OF SKIN CANCER: ICD-10-CM

## 2023-09-07 DIAGNOSIS — E66.3 OVERWEIGHT WITH BODY MASS INDEX (BMI) OF 26 TO 26.9 IN ADULT: ICD-10-CM

## 2023-09-07 DIAGNOSIS — S00.06XD TICK BITE OF SCALP, SUBSEQUENT ENCOUNTER: ICD-10-CM

## 2023-09-07 DIAGNOSIS — M85.89 OSTEOPENIA OF MULTIPLE SITES: ICD-10-CM

## 2023-09-07 DIAGNOSIS — E78.2 MIXED HYPERLIPIDEMIA: ICD-10-CM

## 2023-09-07 DIAGNOSIS — C50.911: ICD-10-CM

## 2023-09-07 DIAGNOSIS — R73.03 PREDIABETES: ICD-10-CM

## 2023-09-07 DIAGNOSIS — I10 PRIMARY HYPERTENSION: ICD-10-CM

## 2023-09-07 DIAGNOSIS — Z17.0: ICD-10-CM

## 2023-09-07 DIAGNOSIS — Z12.11 ENCOUNTER FOR SCREENING FOR MALIGNANT NEOPLASM OF COLON: Primary | ICD-10-CM

## 2023-09-07 DIAGNOSIS — F41.8 MIXED ANXIETY DEPRESSIVE DISORDER: ICD-10-CM

## 2023-09-07 NOTE — PROGRESS NOTES
"Chief Complaint  Hypertension (6 month follow up) and Hyperlipidemia    Subjective        Elizabeth Momin presents to [facility]  History of Present Illness    Elizabeth Momin presents today for encounter for screening colon cancer, tick bite of the scalp, subsequent encounter, stage II lobular carcinoma of the breast, ER positive right, prediabetes, osteopenia, mixed anxiety, and depression, hypertension, hyperlipidemia, history of skin cancer, and overweight with a body mass index of 26. No known allergies.    The patient reports having a ultrasound of her liver, pancreas and gallbladder, she notes it revealed she has a large stone in her gall bladder and she has fatty liver. She states she does not know why she has a fatty liver since she eats very little fried food. She denies having pain.    The patient reports she has tingling and inflammation to her lymph nodes from the tick bite to her scalp 6/2023. She states she is having bad hair loss. She denies having any scalp lesions present.    The patient reports she is not taking the amitriptyline nightly for sleep, but does take Ambien occasionally.     The patient reports she sees Dr. Shelley regarding her previous breast cancer. She states she receives calcium infusions every 6 months. She notes having a mammogram at his office.    The patient reports her anxiety and depression is well controlled. She states taking 2 herbs, Nuci cone and passion flower twice daily. She denies feeling homicidal or suicidal.    The patient reports she is current on eye exams and might need cataract surgery. She states she is current on dental exams.      Objective   Vital Signs:  /84 (BP Location: Left arm, Patient Position: Sitting, Cuff Size: Adult)   Pulse 79   Temp 98.2 °F (36.8 °C) (Tympanic)   Ht 161.3 cm (63.5\")   Wt 69.1 kg (152 lb 6.4 oz)   SpO2 98%   BMI 26.57 kg/m²   Estimated body mass index is 26.57 kg/m² as calculated from " "the following:    Height as of this encounter: 161.3 cm (63.5\").    Weight as of this encounter: 69.1 kg (152 lb 6.4 oz).     ROS:  Negative hearing issues  Negative coughing up blood or sputum  Negative wheezing  Negative smoker  Negative swallowing issues  Negative digestion issues  Negative bowel issues  Negative dark or red stools  Negative dysuria or hematuria  Negative vaginal discharge  Negative fever, chills or night sweats          Physical Exam  Constitutional:       Appearance: Normal appearance.   HENT:      Right Ear: Tympanic membrane normal.      Left Ear: Tympanic membrane normal.      Mouth/Throat:      Mouth: Mucous membranes are moist.      Pharynx: Oropharynx is clear.   Eyes:      Pupils: Pupils are equal, round, and reactive to light.   Neck:      Vascular: No carotid bruit.      Comments:  No thyromegaly, thyroid masses, some mild left anterior cervical adenopathy, no suboccipital adenopathy  Cardiovascular:      Pulses: Normal pulses.      Heart sounds: Normal heart sounds. No murmur heard.  Pulmonary:      Effort: Pulmonary effort is normal.      Breath sounds: Normal breath sounds.   Abdominal:      General: Bowel sounds are normal. There is no distension.      Palpations: There is no mass.      Tenderness: There is no abdominal tenderness.      Comments: No organomegaly   Musculoskeletal:      Right lower leg: No edema.      Left lower leg: No edema.   Neurological:      Mental Status: She is alert.   Psychiatric:         Mood and Affect: Mood normal.         Behavior: Behavior normal.      Result Review :                   Assessment and Plan   Diagnoses and all orders for this visit:    1. Encounter for screening for malignant neoplasm of colon (Primary)    2. Tick bite of scalp, subsequent encounter    3. Stage II lobular carcinoma of breast, ER+, right    4. Prediabetes  -     Comprehensive Metabolic Panel; Future  -     Hemoglobin A1c; Future    5. Osteopenia of multiple sites    6. " Mixed anxiety depressive disorder  -     TSH; Future  -     Vitamin B12; Future  -     Magnesium; Future    7. Primary hypertension  -     CBC Auto Differential; Future    8. Mixed hyperlipidemia  -     Lipid Panel; Future    9. History of skin cancer  -     Ambulatory Referral to Dermatology    10. Overweight with body mass index (BMI) of 26 to 26.9 in adult             Follow Up   Return in about 6 months (around 3/7/2024).  Patient was given instructions and counseling regarding her condition or for health maintenance advice. Please see specific information pulled into the AVS if appropriate.     Transcribed from ambient dictation for Lise Bryant MD by Claudette Villafana.  09/07/23   13:06 EDT    Patient or patient representative verbalized consent to the visit recording.  I have personally performed the services described in this document as transcribed by the above individual, and it is both accurate and complete.

## 2023-09-22 RX ORDER — AMITRIPTYLINE HYDROCHLORIDE 50 MG/1
50 TABLET, FILM COATED ORAL NIGHTLY
Qty: 90 TABLET | Refills: 0 | Status: SHIPPED | OUTPATIENT
Start: 2023-09-22

## 2023-09-27 ENCOUNTER — CLINICAL SUPPORT (OUTPATIENT)
Dept: FAMILY MEDICINE CLINIC | Facility: CLINIC | Age: 73
End: 2023-09-27
Payer: MEDICARE

## 2023-09-27 DIAGNOSIS — R73.03 PREDIABETES: ICD-10-CM

## 2023-09-27 DIAGNOSIS — E78.2 MIXED HYPERLIPIDEMIA: ICD-10-CM

## 2023-09-27 DIAGNOSIS — I10 PRIMARY HYPERTENSION: ICD-10-CM

## 2023-09-27 DIAGNOSIS — F41.8 MIXED ANXIETY DEPRESSIVE DISORDER: ICD-10-CM

## 2023-09-27 LAB
ALBUMIN SERPL-MCNC: 4.3 G/DL (ref 3.5–5.2)
ALBUMIN/GLOB SERPL: 1.6 G/DL
ALP SERPL-CCNC: 38 U/L (ref 39–117)
ALT SERPL W P-5'-P-CCNC: 41 U/L (ref 1–33)
ANION GAP SERPL CALCULATED.3IONS-SCNC: 11.7 MMOL/L (ref 5–15)
AST SERPL-CCNC: 40 U/L (ref 1–32)
BASOPHILS # BLD AUTO: 0.03 10*3/MM3 (ref 0–0.2)
BASOPHILS NFR BLD AUTO: 0.7 % (ref 0–1.5)
BILIRUB SERPL-MCNC: 0.5 MG/DL (ref 0–1.2)
BUN SERPL-MCNC: 16 MG/DL (ref 8–23)
BUN/CREAT SERPL: 14.3 (ref 7–25)
CALCIUM SPEC-SCNC: 9.8 MG/DL (ref 8.6–10.5)
CHLORIDE SERPL-SCNC: 105 MMOL/L (ref 98–107)
CHOLEST SERPL-MCNC: 231 MG/DL (ref 0–200)
CO2 SERPL-SCNC: 23.3 MMOL/L (ref 22–29)
CREAT SERPL-MCNC: 1.12 MG/DL (ref 0.57–1)
DEPRECATED RDW RBC AUTO: 42.3 FL (ref 37–54)
EGFRCR SERPLBLD CKD-EPI 2021: 51.7 ML/MIN/1.73
EOSINOPHIL # BLD AUTO: 0.18 10*3/MM3 (ref 0–0.4)
EOSINOPHIL NFR BLD AUTO: 4 % (ref 0.3–6.2)
ERYTHROCYTE [DISTWIDTH] IN BLOOD BY AUTOMATED COUNT: 12.5 % (ref 12.3–15.4)
GLOBULIN UR ELPH-MCNC: 2.7 GM/DL
GLUCOSE SERPL-MCNC: 98 MG/DL (ref 65–99)
HBA1C MFR BLD: 6.1 % (ref 4.8–5.6)
HCT VFR BLD AUTO: 36.2 % (ref 34–46.6)
HDLC SERPL-MCNC: 34 MG/DL (ref 40–60)
HGB BLD-MCNC: 12.3 G/DL (ref 12–15.9)
IMM GRANULOCYTES # BLD AUTO: 0.01 10*3/MM3 (ref 0–0.05)
IMM GRANULOCYTES NFR BLD AUTO: 0.2 % (ref 0–0.5)
LDLC SERPL CALC-MCNC: 149 MG/DL (ref 0–100)
LDLC/HDLC SERPL: 4.27 {RATIO}
LYMPHOCYTES # BLD AUTO: 2.46 10*3/MM3 (ref 0.7–3.1)
LYMPHOCYTES NFR BLD AUTO: 54.5 % (ref 19.6–45.3)
MAGNESIUM SERPL-MCNC: 2 MG/DL (ref 1.6–2.4)
MCH RBC QN AUTO: 31.8 PG (ref 26.6–33)
MCHC RBC AUTO-ENTMCNC: 34 G/DL (ref 31.5–35.7)
MCV RBC AUTO: 93.5 FL (ref 79–97)
MONOCYTES # BLD AUTO: 0.47 10*3/MM3 (ref 0.1–0.9)
MONOCYTES NFR BLD AUTO: 10.4 % (ref 5–12)
NEUTROPHILS NFR BLD AUTO: 1.36 10*3/MM3 (ref 1.7–7)
NEUTROPHILS NFR BLD AUTO: 30.2 % (ref 42.7–76)
NRBC BLD AUTO-RTO: 0.2 /100 WBC (ref 0–0.2)
PLATELET # BLD AUTO: 240 10*3/MM3 (ref 140–450)
PMV BLD AUTO: 10.3 FL (ref 6–12)
POTASSIUM SERPL-SCNC: 4.2 MMOL/L (ref 3.5–5.2)
PROT SERPL-MCNC: 7 G/DL (ref 6–8.5)
RBC # BLD AUTO: 3.87 10*6/MM3 (ref 3.77–5.28)
SODIUM SERPL-SCNC: 140 MMOL/L (ref 136–145)
TRIGL SERPL-MCNC: 259 MG/DL (ref 0–150)
TSH SERPL DL<=0.05 MIU/L-ACNC: 1.89 UIU/ML (ref 0.27–4.2)
VIT B12 BLD-MCNC: 704 PG/ML (ref 211–946)
VLDLC SERPL-MCNC: 48 MG/DL (ref 5–40)
WBC NRBC COR # BLD: 4.51 10*3/MM3 (ref 3.4–10.8)

## 2023-09-27 PROCEDURE — 83735 ASSAY OF MAGNESIUM: CPT | Performed by: PREVENTIVE MEDICINE

## 2023-09-27 PROCEDURE — 84443 ASSAY THYROID STIM HORMONE: CPT | Performed by: PREVENTIVE MEDICINE

## 2023-09-27 PROCEDURE — 83036 HEMOGLOBIN GLYCOSYLATED A1C: CPT | Performed by: PREVENTIVE MEDICINE

## 2023-09-27 PROCEDURE — 80061 LIPID PANEL: CPT | Performed by: PREVENTIVE MEDICINE

## 2023-09-27 PROCEDURE — 80053 COMPREHEN METABOLIC PANEL: CPT | Performed by: PREVENTIVE MEDICINE

## 2023-09-27 PROCEDURE — 85025 COMPLETE CBC W/AUTO DIFF WBC: CPT | Performed by: PREVENTIVE MEDICINE

## 2023-09-27 PROCEDURE — 36415 COLL VENOUS BLD VENIPUNCTURE: CPT | Performed by: PREVENTIVE MEDICINE

## 2023-09-27 PROCEDURE — 82607 VITAMIN B-12: CPT | Performed by: PREVENTIVE MEDICINE

## 2023-09-27 NOTE — PROGRESS NOTES
Venipuncture performed on Left Arm by Nadira Morales MA  with good hemostasis. Patient tolerated well. 09/27/23  LEO Callejas

## 2023-09-28 ENCOUNTER — TELEPHONE (OUTPATIENT)
Dept: FAMILY MEDICINE CLINIC | Facility: CLINIC | Age: 73
End: 2023-09-28
Payer: MEDICARE

## 2023-09-28 NOTE — PROGRESS NOTES
Liver enzymes are still elevated they have improved some.  Have you ever seen a gastroenterologist about this?.  Are you taking acetaminophen and ibuprofen every day?  Glucose was normal at 98 but A1c shows excess risk for diabetes at 6.1 so continue to watch your carbohydrates.  Kidney function is still decreased so I would try to avoid ibuprofen if possible and limit x-ray dyes also limit Aleve and Advil.  Total and bad cholesterol are both elevated but I hate to put you on a statin till we know what is going on with your liver please let us know about the above

## 2023-09-28 NOTE — TELEPHONE ENCOUNTER
HUB TO READ:  ----- Message from Lise Bryant MD sent at 9/28/2023  7:58 AM EDT -----  Liver enzymes are still elevated they have improved some.  Have you ever seen a gastroenterologist about this?.  Are you taking acetaminophen and ibuprofen every day?  Glucose was normal at 98 but A1c shows excess risk for diabetes at 6.1 so continue to watch your carbohydrates.  Kidney function is still decreased so I would try to avoid ibuprofen if possible and limit x-ray dyes also limit Aleve and Advil.  Total and bad cholesterol are both elevated but I hate to put you on a statin till we know what is going on with your liver please let us know about the above

## 2023-10-23 NOTE — TELEPHONE ENCOUNTER
----- Message from Venus Jason sent at 10/23/2023  2:03 PM CDT -----  Contact: Kate  Patient is calling to speak with the nurse. Reports that home health nurse has left messages with no response. Also reports not having a bowel movement in 5 days and is suffering with constipation and diarrhea. Please give patient a call at [phone].     Patient is scheduled for physical on 07/01/2020 and wants to go next week and get her labs done. Also wants to do her mammo same day. I ordered Mammo but wanted to make sure since she had a mastectomy last year if there was something different I needed to order on that. Also on labs if there was anything else you wanted before I signed and sent to [facility].   I already ordered: CMP,Lipid, CBC, A1C, Vit D, TSH and the screening mammo.    Warm

## 2023-12-21 RX ORDER — LOSARTAN POTASSIUM 100 MG/1
100 TABLET ORAL DAILY
Qty: 90 TABLET | Refills: 0 | Status: SHIPPED | OUTPATIENT
Start: 2023-12-21

## 2023-12-21 RX ORDER — AMITRIPTYLINE HYDROCHLORIDE 50 MG/1
50 TABLET, FILM COATED ORAL NIGHTLY
Qty: 90 TABLET | Refills: 0 | Status: SHIPPED | OUTPATIENT
Start: 2023-12-21

## 2024-01-16 ENCOUNTER — TELEPHONE (OUTPATIENT)
Dept: FAMILY MEDICINE CLINIC | Facility: CLINIC | Age: 74
End: 2024-01-16
Payer: MEDICARE

## 2024-03-07 NOTE — PATIENT INSTRUCTIONS
Health Maintenance Due   Topic Date Due    ZOSTER VACCINE (1 of 2) Never done    RSV Vaccine - Adults (1 - 1-dose 60+ series) Never done    INFLUENZA VACCINE  08/01/2023    COLORECTAL CANCER SCREENING  08/12/2023    COVID-19 Vaccine (4 - 2023-24 season) 09/01/2023    ANNUAL WELLNESS VISIT  03/07/2024    BMI FOLLOWUP  03/07/2024

## 2024-03-07 NOTE — PROGRESS NOTES
The ABCs of the Annual Wellness Visit  Subsequent Medicare Wellness Visit    Subjective    History of Present Illness:  Elizabeth Momin is a 74 y.o. female who presents for a Subsequent Medicare Wellness Visit.    The following portions of the patient's history were reviewed and   updated as appropriate: allergies, current medications, past family history, past medical history, past social history, past surgical history, and problem list.    Compared to one year ago, the patient feels her physical   health is the same.    Compared to one year ago, the patient feels her mental   health is the same.    Recent Hospitalizations:  She was not admitted to the hospital during the last year.       Current Medical Providers:  Patient Care Team:  Lise Bryant MD as PCP - General (Family Medicine)  Eugene Stockton MD as Consulting Physician (Hematology and Oncology)  Jevon López MD as Consulting Physician (Radiation Oncology)    Outpatient Medications Prior to Visit   Medication Sig Dispense Refill    acetaminophen (TYLENOL) 650 MG 8 hr tablet Take 1 tablet by mouth.      amitriptyline (ELAVIL) 50 MG tablet TAKE 1 TABLET BY MOUTH EVERY NIGHT 90 tablet 0    cetirizine (zyrTEC) 10 MG tablet Take 1 tablet by mouth Daily. 90 tablet 3    glucosamine-chondroitin 500-400 MG capsule capsule Take  by mouth 3 (Three) Times a Day With Meals.      ibuprofen (ADVIL,MOTRIN) 800 MG tablet Take 1 tablet by mouth Every 6 (Six) Hours As Needed.      losartan (COZAAR) 100 MG tablet TAKE 1 TABLET BY MOUTH DAILY 90 tablet 0    montelukast (SINGULAIR) 10 MG tablet Take 1 tablet by mouth every night at bedtime. 90 tablet 3    Multiple Vitamin (MULTI-VITAMIN) tablet Take 1 tablet by mouth Daily.      Passion Flower-Valerian 500-500 MG capsule Take  by mouth Daily.      atorvastatin (LIPITOR) 10 MG tablet Take 1 tablet by mouth Daily. (Patient not taking: Reported on 3/8/2024)      bacitracin 500 UNIT/GM ointment Apply 1 application  "topically to the appropriate area as directed 2 (Two) Times a Day. 28 g 1    letrozole (FEMARA) 2.5 MG tablet Take 1 tablet by mouth Daily. (Patient not taking: Reported on 3/8/2024)       No facility-administered medications prior to visit.       No opioid medication identified on active medication list. I have reviewed chart for other potential  high risk medication/s and harmful drug interactions in the elderly.        Aspirin is not on active medication list.  Aspirin use is not indicated based on review of current medical condition/s. Risk of harm outweighs potential benefits.  .  Patient Active Problem List   Diagnosis    Allergic rhinitis    Hyperlipidemia    Hypertension    Chronic insomnia    Mixed anxiety depressive disorder    Osteopenia of multiple sites    Overweight with body mass index (BMI) of 26 to 26.9 in adult    Chronic sinusitis    Osteoarthritis    S/P right mastectomy    Stage II lobular carcinoma of breast, ER+, right    Prediabetes    Elevated liver enzymes    Flank pain    History of skin cancer    Herpes zoster with complication    Tick bite of scalp     Advance Care Planning  Advance Directive is not on file.  ACP discussion was held with the patient during this visit. Patient has an advance directive (not in EMR), copy requested.     Objective    Vitals:    03/08/24 1030   BP: 120/80   BP Location: Left arm   Patient Position: Sitting   Cuff Size: Adult   Pulse: 93   Temp: 97 °F (36.1 °C)   TempSrc: Temporal   SpO2: 94%   Weight: 69.7 kg (153 lb 9.6 oz)   Height: 161.3 cm (63.5\")   PainSc: 0-No pain     Estimated body mass index is 26.78 kg/m² as calculated from the following:    Height as of this encounter: 161.3 cm (63.5\").    Weight as of this encounter: 69.7 kg (153 lb 9.6 oz).    BMI is >= 25 and <30. (Overweight) The following options were offered after discussion;: exercise counseling/recommendations and nutrition counseling/recommendations      Does the patient have evidence of " cognitive impairment? No          HEALTH RISK ASSESSMENT    Smoking Status:  Social History     Tobacco Use   Smoking Status Never   Smokeless Tobacco Never   Tobacco Comments    Passive Smoke Exposure: No      Alcohol Consumption:  Social History     Substance and Sexual Activity   Alcohol Use No     Fall Risk Screen:    YORDY Fall Risk Assessment was completed, and patient is at LOW risk for falls.Assessment completed on:3/8/2024    Depression Screening:      3/8/2024    10:28 AM   PHQ-2/PHQ-9 Depression Screening   Little Interest or Pleasure in Doing Things 0-->not at all   Feeling Down, Depressed or Hopeless 0-->not at all   PHQ-9: Brief Depression Severity Measure Score 0       Health Habits and Functional and Cognitive Screening:      3/8/2024    10:29 AM   Functional & Cognitive Status   Do you have difficulty preparing food and eating? No   Do you have difficulty bathing yourself, getting dressed or grooming yourself? No   Do you have difficulty using the toilet? No   Do you have difficulty moving around from place to place? No   Do you have trouble with steps or getting out of a bed or a chair? No   Current Diet Well Balanced Diet   Dental Exam Up to date   Eye Exam Not up to date   Exercise (times per week) 0 times per week   Current Exercises Include No Regular Exercise   Do you need help using the phone?  No   Are you deaf or do you have serious difficulty hearing?  No   Do you need help to go to places out of walking distance? No   Do you need help shopping? No   Do you need help preparing meals?  No   Do you need help with housework?  No   Do you need help with laundry? No   Do you need help taking your medications? No   Do you need help managing money? No   Do you ever drive or ride in a car without wearing a seat belt? No   Have you felt unusual stress, anger or loneliness in the last month? No   Who do you live with? Spouse   If you need help, do you have trouble finding someone available to you?  No   Have you been bothered in the last four weeks by sexual problems? No   Do you have difficulty concentrating, remembering or making decisions? No       Age-appropriate Screening Schedule:  Refer to the list below for future screening recommendations based on patient's age, sex and/or medical conditions. Orders for these recommended tests are listed in the plan section. The patient has been provided with a written plan.    Health Maintenance   Topic Date Due    ZOSTER VACCINE (1 of 2) Never done    RSV Vaccine - Adults (1 - 1-dose 60+ series) Never done    COLORECTAL CANCER SCREENING  08/12/2023    COVID-19 Vaccine (4 - 2023-24 season) 09/01/2023    DXA SCAN  07/06/2024    LIPID PANEL  09/27/2024    MAMMOGRAM  12/13/2024    ANNUAL WELLNESS VISIT  03/08/2025    BMI FOLLOWUP  03/08/2025    TDAP/TD VACCINES (3 - Td or Tdap) 08/02/2028    HEPATITIS C SCREENING  Completed    INFLUENZA VACCINE  Completed    Pneumococcal Vaccine 65+  Completed                CMS Preventative Services Quick Reference  Risk Factors Identified During Encounter  None Identified  The above risks/problems have been discussed with the patient.  Follow up actions/plans if indicated are seen below in the Assessment/Plan Section.  Pertinent information has been shared with the patient in the After Visit Summary.  An After Visit Summary and PPPS were made available to the patient.    Follow Up:   Next Medicare Wellness visit to be scheduled in 1 year.         Additional E&M Note during same encounter follows:  Patient has multiple medical problems which are significant and separately identifiable that require additional work above and beyond the Medicare Wellness Visit.        Chief Complaint  Medicare Wellness-subsequent (Patient is fasting./Does not have any concerns today.) and Hyperlipidemia    Subjective        HPI  Elizabeth Momin also presents   Chief Complaint   Patient presents with    Medicare Wellness-subsequent     Patient is  fasting.  Does not have any concerns today.    Hyperlipidemia   .    The patient is a 74-year-old female who is here today for encounter for her annual wellness exam for Medicare, screening for colon cancer, stage 2 lobular carcinoma of the breast ER positive right, prediabetes, overweight with a body mass index of 26, osteopenia of multiple sites, anxiety, depressive disorder, hypertension, hyperlipidemia, history of skin cancer, flank pain, and chronic insomnia.     She is due for her eye and dental exams.     She cut back on her work due to arm pain on the side of her mastectomy. She had a mammogram, ultrasounds, and chest x-rays, which were normal. She was having trouble getting her lymph nodes to decrease in size, but it finally went down. She was put on some herbs for better health by one of her friends, who told her to get more fruits and vegetables than her diet, and her labs come back better.     She takes her allergy pill at night, but at night, she has some wheezing. She denies any trouble breathing. She noticed it in the last few months. She had asthma when she was young. She has a hacking cough in the morning, which clears up after she drinks some hot coffee. She does not notice any wheezing while she is working.     She has pain in her right.   She occasionally has increased pain and other times she feels her knee weak and is instable. She fell at work, but it had nothing to do with her knee. She hears popping and cracking when she leans over. She denies any pain with movement of the kneecaps. She has not noticed any swelling. She sleeps with a pillow between her legs. She changes her pillow when she turns over.     She had a gallstone of 35 mm. She was put on a gallbladder cleanse, which she did, and she is planning to do it again as soon as she runs out of the next bottle. She has done the Cologuard in the past.    She had a heart catheterization 10 years ago due to pressure in her chest. She has not  "had an EKG.    She has memory issues. She can remember things back, especially someone says something. She is getting terrible about seeing someone or coming up with their name.      She denies any family history of colon cancer. She has a family history of heart problems. One of her sisters has diabetes. Her brother  of a myocardial infraction. Her brother had cancer, but he was an alcoholic. Her mother had heart problems. Her father had allergies.    The patient is allergic to CATS.    She stopped taking atorvastatin due to elevated liver enzymes. She was told that if she got off the losartan, it might improve her fatigue. She has been on the losartan for years.     She has received 2 COVID-19 vaccines.  Review of Systems   Constitutional:  Negative for chills, diaphoresis and fever.   Respiratory:  Positive for wheezing.    Gastrointestinal:  Negative for blood in stool, constipation and diarrhea.   Genitourinary:  Negative for dysuria, hematuria and vaginal discharge.   Musculoskeletal:  Positive for arthralgias, gait problem and myalgias.       Objective   Vital Signs:  /80 (BP Location: Left arm, Patient Position: Sitting, Cuff Size: Adult)   Pulse 93   Temp 97 °F (36.1 °C) (Temporal)   Ht 161.3 cm (63.5\")   Wt 69.7 kg (153 lb 9.6 oz)   SpO2 94%   BMI 26.78 kg/m²     Physical Exam  HENT:      Ears:      Comments: Cerumen present in one ear. Eardrum looks okay.      Mouth/Throat:      Comments:  Throat looks normal.  Neck:      Comments: No thyromegaly, thyroid mass, or cervical or suboccipital adenopathy.  Pulmonary:      Comments: Breath sounds are normal and equal in all six lung fields.  Musculoskeletal:      Comments: Tenderness in the anserine bursa.  No knee swelling she does have crepitus with the left knee flexion and extension but none with the right.  Sag home test is negative extension against resistance is normal and test Salaiz test was negative bilaterally                       "   Assessment and Plan   Diagnoses and all orders for this visit:    1. Encounter for annual general medical examination with abnormal findings in adult (Primary)    2. Screening for colon cancer  -     Cologuard - Stool, Per Rectum; Future    3. Stage II lobular carcinoma of breast, ER+, right    4. Prediabetes  -     Comprehensive Metabolic Panel  -     Hemoglobin A1c    5. Overweight with body mass index (BMI) of 26 to 26.9 in adult  Assessment & Plan:  Patient's (There is no height or weight on file to calculate BMI.) indicates that they are overweight with health conditions that include hypertension, diabetes mellitus, and dyslipidemias . Weight is unchanged. BMI is is above average; BMI management plan is completed. We discussed portion control and increasing exercise.       6. Osteopenia of multiple sites    7. Mixed anxiety depressive disorder  -     Magnesium  -     Vitamin B12  -     TSH Rfx On Abnormal To Free T4; Future  -     TSH Rfx On Abnormal To Free T4    8. Primary hypertension  -     CBC Auto Differential  -     Ambulatory Referral to Cardiology    9. Mixed hyperlipidemia  Comments:  She was advised to watch her saturated fats intake.  Orders:  -     Lipid Panel    10. History of skin cancer  Comments:  She will follow up with dermatology as needed.    11. Flank pain  -     Urinalysis With Culture If Indicated - Urine, Clean Catch  -     [place] Urine Culture Tube - Urine, Clean Catch    12. Chronic insomnia             Follow Up   No follow-ups on file.  Patient was given instructions and counseling regarding her condition or for health maintenance advice. Please see specific information pulled into the AVS if appropriate.   Transcribed from ambient dictation for Lise Bryant MD by Kendra Rick.  03/08/24   12:35 EST    Patient or patient representative verbalized consent to the visit recording.  I have personally performed the services described in this document as transcribed by the above  individual, and it is both accurate and complete.

## 2024-03-07 NOTE — ASSESSMENT & PLAN NOTE
Patient's (There is no height or weight on file to calculate BMI.) indicates that they are overweight with health conditions that include hypertension, diabetes mellitus, and dyslipidemias . Weight is unchanged. BMI is is above average; BMI management plan is completed. We discussed portion control and increasing exercise.

## 2024-03-08 ENCOUNTER — OFFICE VISIT (OUTPATIENT)
Dept: FAMILY MEDICINE CLINIC | Facility: CLINIC | Age: 74
End: 2024-03-08
Payer: MEDICARE

## 2024-03-08 VITALS
OXYGEN SATURATION: 94 % | HEIGHT: 64 IN | BODY MASS INDEX: 26.22 KG/M2 | TEMPERATURE: 97 F | SYSTOLIC BLOOD PRESSURE: 120 MMHG | DIASTOLIC BLOOD PRESSURE: 80 MMHG | WEIGHT: 153.6 LBS | HEART RATE: 93 BPM

## 2024-03-08 DIAGNOSIS — M85.89 OSTEOPENIA OF MULTIPLE SITES: ICD-10-CM

## 2024-03-08 DIAGNOSIS — F41.8 MIXED ANXIETY DEPRESSIVE DISORDER: ICD-10-CM

## 2024-03-08 DIAGNOSIS — I10 PRIMARY HYPERTENSION: ICD-10-CM

## 2024-03-08 DIAGNOSIS — F51.04 CHRONIC INSOMNIA: ICD-10-CM

## 2024-03-08 DIAGNOSIS — Z00.01 ENCOUNTER FOR ANNUAL GENERAL MEDICAL EXAMINATION WITH ABNORMAL FINDINGS IN ADULT: Primary | ICD-10-CM

## 2024-03-08 DIAGNOSIS — R10.9 FLANK PAIN: ICD-10-CM

## 2024-03-08 DIAGNOSIS — C50.911: ICD-10-CM

## 2024-03-08 DIAGNOSIS — Z17.0: ICD-10-CM

## 2024-03-08 DIAGNOSIS — Z85.828 HISTORY OF SKIN CANCER: ICD-10-CM

## 2024-03-08 DIAGNOSIS — Z12.11 SCREENING FOR COLON CANCER: ICD-10-CM

## 2024-03-08 DIAGNOSIS — E78.2 MIXED HYPERLIPIDEMIA: ICD-10-CM

## 2024-03-08 DIAGNOSIS — E66.3 OVERWEIGHT WITH BODY MASS INDEX (BMI) OF 26 TO 26.9 IN ADULT: ICD-10-CM

## 2024-03-08 DIAGNOSIS — R73.03 PREDIABETES: ICD-10-CM

## 2024-03-08 LAB
ALBUMIN SERPL-MCNC: 4.6 G/DL (ref 3.5–5.2)
ALBUMIN/GLOB SERPL: 1.4 G/DL
ALP SERPL-CCNC: 49 U/L (ref 39–117)
ALT SERPL W P-5'-P-CCNC: 98 U/L (ref 1–33)
ANION GAP SERPL CALCULATED.3IONS-SCNC: 12 MMOL/L (ref 5–15)
AST SERPL-CCNC: 95 U/L (ref 1–32)
BASOPHILS # BLD AUTO: 0.04 10*3/MM3 (ref 0–0.2)
BASOPHILS NFR BLD AUTO: 0.6 % (ref 0–1.5)
BILIRUB SERPL-MCNC: 0.7 MG/DL (ref 0–1.2)
BUN SERPL-MCNC: 19 MG/DL (ref 8–23)
BUN/CREAT SERPL: 17 (ref 7–25)
CALCIUM SPEC-SCNC: 10.6 MG/DL (ref 8.6–10.5)
CHLORIDE SERPL-SCNC: 103 MMOL/L (ref 98–107)
CHOLEST SERPL-MCNC: 300 MG/DL (ref 0–200)
CO2 SERPL-SCNC: 24 MMOL/L (ref 22–29)
CREAT SERPL-MCNC: 1.12 MG/DL (ref 0.57–1)
DEPRECATED RDW RBC AUTO: 45.9 FL (ref 37–54)
EGFRCR SERPLBLD CKD-EPI 2021: 51.4 ML/MIN/1.73
EOSINOPHIL # BLD AUTO: 0.22 10*3/MM3 (ref 0–0.4)
EOSINOPHIL NFR BLD AUTO: 3.4 % (ref 0.3–6.2)
ERYTHROCYTE [DISTWIDTH] IN BLOOD BY AUTOMATED COUNT: 13 % (ref 12.3–15.4)
GLOBULIN UR ELPH-MCNC: 3.2 GM/DL
GLUCOSE SERPL-MCNC: 100 MG/DL (ref 65–99)
HBA1C MFR BLD: 6.1 % (ref 4.8–5.6)
HCT VFR BLD AUTO: 39.8 % (ref 34–46.6)
HDLC SERPL-MCNC: 45 MG/DL (ref 40–60)
HGB BLD-MCNC: 13.6 G/DL (ref 12–15.9)
IMM GRANULOCYTES # BLD AUTO: 0.02 10*3/MM3 (ref 0–0.05)
IMM GRANULOCYTES NFR BLD AUTO: 0.3 % (ref 0–0.5)
LDLC SERPL CALC-MCNC: 222 MG/DL (ref 0–100)
LDLC/HDLC SERPL: 4.9 {RATIO}
LYMPHOCYTES # BLD AUTO: 3 10*3/MM3 (ref 0.7–3.1)
LYMPHOCYTES NFR BLD AUTO: 46.9 % (ref 19.6–45.3)
MAGNESIUM SERPL-MCNC: 1.7 MG/DL (ref 1.6–2.4)
MCH RBC QN AUTO: 32.7 PG (ref 26.6–33)
MCHC RBC AUTO-ENTMCNC: 34.2 G/DL (ref 31.5–35.7)
MCV RBC AUTO: 95.7 FL (ref 79–97)
MONOCYTES # BLD AUTO: 0.65 10*3/MM3 (ref 0.1–0.9)
MONOCYTES NFR BLD AUTO: 10.2 % (ref 5–12)
NEUTROPHILS NFR BLD AUTO: 2.46 10*3/MM3 (ref 1.7–7)
NEUTROPHILS NFR BLD AUTO: 38.6 % (ref 42.7–76)
NRBC BLD AUTO-RTO: 0 /100 WBC (ref 0–0.2)
PLATELET # BLD AUTO: 270 10*3/MM3 (ref 140–450)
PMV BLD AUTO: 10.2 FL (ref 6–12)
POTASSIUM SERPL-SCNC: 4.4 MMOL/L (ref 3.5–5.2)
PROT SERPL-MCNC: 7.8 G/DL (ref 6–8.5)
RBC # BLD AUTO: 4.16 10*6/MM3 (ref 3.77–5.28)
SODIUM SERPL-SCNC: 139 MMOL/L (ref 136–145)
TRIGL SERPL-MCNC: 172 MG/DL (ref 0–150)
TSH SERPL DL<=0.05 MIU/L-ACNC: 2.49 UIU/ML (ref 0.27–4.2)
VIT B12 BLD-MCNC: 834 PG/ML (ref 211–946)
VLDLC SERPL-MCNC: 33 MG/DL (ref 5–40)
WBC NRBC COR # BLD AUTO: 6.39 10*3/MM3 (ref 3.4–10.8)

## 2024-03-08 PROCEDURE — 80061 LIPID PANEL: CPT | Performed by: PREVENTIVE MEDICINE

## 2024-03-08 PROCEDURE — 83735 ASSAY OF MAGNESIUM: CPT | Performed by: PREVENTIVE MEDICINE

## 2024-03-08 PROCEDURE — 84443 ASSAY THYROID STIM HORMONE: CPT | Performed by: PREVENTIVE MEDICINE

## 2024-03-08 PROCEDURE — 83036 HEMOGLOBIN GLYCOSYLATED A1C: CPT | Performed by: PREVENTIVE MEDICINE

## 2024-03-08 PROCEDURE — 81003 URINALYSIS AUTO W/O SCOPE: CPT | Performed by: PREVENTIVE MEDICINE

## 2024-03-08 PROCEDURE — 82607 VITAMIN B-12: CPT | Performed by: PREVENTIVE MEDICINE

## 2024-03-08 PROCEDURE — 85025 COMPLETE CBC W/AUTO DIFF WBC: CPT | Performed by: PREVENTIVE MEDICINE

## 2024-03-08 PROCEDURE — 80053 COMPREHEN METABOLIC PANEL: CPT | Performed by: PREVENTIVE MEDICINE

## 2024-03-08 RX ORDER — CALCIUM CITRATE/VITAMIN D3 125-62.5
TABLET ORAL DAILY
COMMUNITY

## 2024-03-08 NOTE — PROGRESS NOTES
Venipuncture performed on Left Arm by Nadira Morales MA  with good hemostasis. Patient tolerated well. 03/08/24

## 2024-03-09 LAB
BILIRUB UR QL STRIP: NEGATIVE
CLARITY UR: CLEAR
COLOR UR: YELLOW
GLUCOSE UR STRIP-MCNC: NEGATIVE MG/DL
HGB UR QL STRIP.AUTO: NEGATIVE
HOLD SPECIMEN: NORMAL
KETONES UR QL STRIP: NEGATIVE
LEUKOCYTE ESTERASE UR QL STRIP.AUTO: NEGATIVE
NITRITE UR QL STRIP: NEGATIVE
PH UR STRIP.AUTO: 6.5 [PH] (ref 5–8)
PROT UR QL STRIP: NEGATIVE
SP GR UR STRIP: 1.01 (ref 1–1.03)
UROBILINOGEN UR QL STRIP: NORMAL

## 2024-03-10 ENCOUNTER — TELEPHONE (OUTPATIENT)
Dept: FAMILY MEDICINE CLINIC | Facility: CLINIC | Age: 74
End: 2024-03-10
Payer: MEDICARE

## 2024-03-10 NOTE — TELEPHONE ENCOUNTER
Relay  ----- Message from Lise Bryant MD sent at 3/10/2024  9:10 AM EDT -----  Glucose is 100 with A1c still showing excess risk for diabetes at 6.1.  Your kidney function is decreased but unchanged since the last visit so avoid ibuprofen Aleve Motrin and limit x-ray dyes.  Unfortunately your cholesterol is 300 with goal being 200 and bad cholesterol is 222 with goal being 100.  Please try to limit your saturated fats and increase your walking starting a statin at your age has not been shown to improve the length or quality of your life but we would be glad to do so if you desire you wish to do this call and let us know about the above or if you have any other questions or concerns

## 2024-03-10 NOTE — PROGRESS NOTES
Glucose is 100 with A1c still showing excess risk for diabetes at 6.1.  Your kidney function is decreased but unchanged since the last visit so avoid ibuprofen Aleve Motrin and limit x-ray dyes.  Unfortunately your cholesterol is 300 with goal being 200 and bad cholesterol is 222 with goal being 100.  Please try to limit your saturated fats and increase your walking starting a statin at your age has not been shown to improve the length or quality of your life but we would be glad to do so if you desire you wish to do this call and let us know about the above or if you have any other questions or concerns

## 2024-03-11 ENCOUNTER — TELEPHONE (OUTPATIENT)
Dept: FAMILY MEDICINE CLINIC | Facility: CLINIC | Age: 74
End: 2024-03-11
Payer: MEDICARE

## 2024-03-11 DIAGNOSIS — R74.01 ELEVATED TRANSAMINASE LEVEL: Primary | ICD-10-CM

## 2024-03-11 NOTE — TELEPHONE ENCOUNTER
Lets stop all supplements that have not been FDA certified to make sure that they have in them what it says is in the in the package.  I would also stop acetaminophen and ibuprofen and we will recheck your liver enzymes again in 3 weeks.  If the liver enzymes have not improved we may need to cut back on amitriptyline and losartan.  And again total and bad cholesterol are elevated but since the liver enzymes are 3 times normal we cannot consider a statin kidney function is still decreased so avoid ibuprofen Aleve Motrin and limit x-ray dyes as well.  Have you ever seen a gastroenterologist for your elevated liver enzymes it looks like you have had them ever since you are seeing [facility].

## 2024-03-11 NOTE — TELEPHONE ENCOUNTER
Patient is aware of results. Pt had questions about her liver enzymes that I advised her to ask about at her last visit.

## 2024-03-13 NOTE — TELEPHONE ENCOUNTER
PT was called and informed she will attempted to stop the pain medication in the mean time is the anything else she can take for pain? Also she is set up for a lab appointment in 3 weeks.

## 2024-03-14 RX ORDER — LOSARTAN POTASSIUM 100 MG/1
100 TABLET ORAL DAILY
Qty: 90 TABLET | Refills: 0 | Status: SHIPPED | OUTPATIENT
Start: 2024-03-14

## 2024-03-14 RX ORDER — AMITRIPTYLINE HYDROCHLORIDE 50 MG/1
50 TABLET, FILM COATED ORAL NIGHTLY
Qty: 90 TABLET | Refills: 0 | Status: SHIPPED | OUTPATIENT
Start: 2024-03-14

## 2024-03-14 RX ORDER — CETIRIZINE HYDROCHLORIDE 10 MG/1
10 TABLET ORAL DAILY
Qty: 90 TABLET | Refills: 3 | Status: SHIPPED | OUTPATIENT
Start: 2024-03-14

## 2024-04-04 ENCOUNTER — LAB (OUTPATIENT)
Dept: FAMILY MEDICINE CLINIC | Facility: CLINIC | Age: 74
End: 2024-04-04
Payer: MEDICARE

## 2024-04-04 DIAGNOSIS — R74.01 ELEVATED TRANSAMINASE LEVEL: ICD-10-CM

## 2024-04-04 LAB
ALBUMIN SERPL-MCNC: 4.2 G/DL (ref 3.5–5.2)
ALBUMIN/GLOB SERPL: 1.4 G/DL
ALP SERPL-CCNC: 40 U/L (ref 39–117)
ALT SERPL W P-5'-P-CCNC: 76 U/L (ref 1–33)
ANION GAP SERPL CALCULATED.3IONS-SCNC: 10.5 MMOL/L (ref 5–15)
AST SERPL-CCNC: 63 U/L (ref 1–32)
BILIRUB SERPL-MCNC: 0.5 MG/DL (ref 0–1.2)
BUN SERPL-MCNC: 13 MG/DL (ref 8–23)
BUN/CREAT SERPL: 13.7 (ref 7–25)
CALCIUM SPEC-SCNC: 9.6 MG/DL (ref 8.6–10.5)
CHLORIDE SERPL-SCNC: 106 MMOL/L (ref 98–107)
CO2 SERPL-SCNC: 24.5 MMOL/L (ref 22–29)
CREAT SERPL-MCNC: 0.95 MG/DL (ref 0.57–1)
EGFRCR SERPLBLD CKD-EPI 2021: 62.6 ML/MIN/1.73
GLOBULIN UR ELPH-MCNC: 2.9 GM/DL
GLUCOSE SERPL-MCNC: 111 MG/DL (ref 65–99)
POTASSIUM SERPL-SCNC: 4.4 MMOL/L (ref 3.5–5.2)
PROT SERPL-MCNC: 7.1 G/DL (ref 6–8.5)
SODIUM SERPL-SCNC: 141 MMOL/L (ref 136–145)

## 2024-04-04 PROCEDURE — 80053 COMPREHEN METABOLIC PANEL: CPT | Performed by: PREVENTIVE MEDICINE

## 2024-04-05 ENCOUNTER — TELEPHONE (OUTPATIENT)
Dept: FAMILY MEDICINE CLINIC | Facility: CLINIC | Age: 74
End: 2024-04-05
Payer: MEDICARE

## 2024-04-05 NOTE — TELEPHONE ENCOUNTER
Caller: Elizabeth Momin    Relationship: Self    Best call back number: [phone]     Caller requesting test results: ELIZABETH    What test was performed: LABS    When was the test performed: 04/04/24    Where was the test performed: [facility]     Additional notes:

## 2024-04-05 NOTE — PROGRESS NOTES
Liver function tests are still mildly elevated certainly improved from the last check but not normal.  Can continue to watch or can refer to gastroenterologist which ever she would prefer.  If she does not see gastroenterology we should repeat them again in 4 weeks.  Glucose was 111 so avoid carbs and increase walking.  Call if any other questions or concerns and let us know about the above.  Sure that you have also decreased your use of acetaminophen and ibuprofen and limited any alcohol that you might be ingesting.

## 2024-05-10 ENCOUNTER — CLINICAL SUPPORT (OUTPATIENT)
Dept: FAMILY MEDICINE CLINIC | Facility: CLINIC | Age: 74
End: 2024-05-10
Payer: MEDICARE

## 2024-05-10 DIAGNOSIS — E78.2 MIXED HYPERLIPIDEMIA: Primary | ICD-10-CM

## 2024-05-10 LAB
ALBUMIN SERPL-MCNC: 4.2 G/DL (ref 3.5–5.2)
ALBUMIN/GLOB SERPL: 1.5 G/DL
ALP SERPL-CCNC: 48 U/L (ref 39–117)
ALT SERPL W P-5'-P-CCNC: 71 U/L (ref 1–33)
ANION GAP SERPL CALCULATED.3IONS-SCNC: 10.3 MMOL/L (ref 5–15)
AST SERPL-CCNC: 57 U/L (ref 1–32)
BILIRUB SERPL-MCNC: 0.6 MG/DL (ref 0–1.2)
BUN SERPL-MCNC: 13 MG/DL (ref 8–23)
BUN/CREAT SERPL: 13.3 (ref 7–25)
CALCIUM SPEC-SCNC: 9.8 MG/DL (ref 8.6–10.5)
CHLORIDE SERPL-SCNC: 105 MMOL/L (ref 98–107)
CO2 SERPL-SCNC: 24.7 MMOL/L (ref 22–29)
CREAT SERPL-MCNC: 0.98 MG/DL (ref 0.57–1)
EGFRCR SERPLBLD CKD-EPI 2021: 60.3 ML/MIN/1.73
GLOBULIN UR ELPH-MCNC: 2.8 GM/DL
GLUCOSE SERPL-MCNC: 112 MG/DL (ref 65–99)
POTASSIUM SERPL-SCNC: 4.3 MMOL/L (ref 3.5–5.2)
PROT SERPL-MCNC: 7 G/DL (ref 6–8.5)
SODIUM SERPL-SCNC: 140 MMOL/L (ref 136–145)

## 2024-05-10 PROCEDURE — 36415 COLL VENOUS BLD VENIPUNCTURE: CPT

## 2024-05-10 PROCEDURE — 80053 COMPREHEN METABOLIC PANEL: CPT | Performed by: PREVENTIVE MEDICINE

## 2024-05-10 PROCEDURE — 36415 COLL VENOUS BLD VENIPUNCTURE: CPT | Performed by: PREVENTIVE MEDICINE

## 2024-05-10 RX ORDER — LETROZOLE 2.5 MG/1
1 TABLET, FILM COATED ORAL DAILY
COMMUNITY
Start: 2024-03-19

## 2024-05-13 ENCOUNTER — TELEPHONE (OUTPATIENT)
Dept: FAMILY MEDICINE CLINIC | Facility: CLINIC | Age: 74
End: 2024-05-13
Payer: MEDICARE

## 2024-05-13 DIAGNOSIS — R74.01 ELEVATED TRANSAMINASE LEVEL: Primary | ICD-10-CM

## 2024-05-13 NOTE — TELEPHONE ENCOUNTER
"Relay     \"Liver functions have decreased minimally.  If you have been staying away from acetaminophen ibuprofen Advil Motrin as advised as well as any alcohol.  We should refer you to the gastroenterologist for evaluation let us know if you agree and referral will be placed glucose was also 112 so watch her carbs.  If you are still taking passion flower or any other supplements I would also discontinue those before you see the gastroenterologist \"                "

## 2024-05-22 ENCOUNTER — CONSULT (OUTPATIENT)
Dept: CARDIOLOGY | Facility: CLINIC | Age: 74
End: 2024-05-22
Payer: MEDICARE

## 2024-05-22 VITALS
HEIGHT: 64 IN | BODY MASS INDEX: 26.29 KG/M2 | OXYGEN SATURATION: 99 % | HEART RATE: 103 BPM | SYSTOLIC BLOOD PRESSURE: 101 MMHG | WEIGHT: 154 LBS | DIASTOLIC BLOOD PRESSURE: 72 MMHG

## 2024-05-22 DIAGNOSIS — R06.02 SOB (SHORTNESS OF BREATH): ICD-10-CM

## 2024-05-22 DIAGNOSIS — E78.2 MIXED HYPERLIPIDEMIA: ICD-10-CM

## 2024-05-22 DIAGNOSIS — I10 PRIMARY HYPERTENSION: ICD-10-CM

## 2024-05-22 DIAGNOSIS — R53.83 OTHER FATIGUE: Primary | ICD-10-CM

## 2024-05-22 DIAGNOSIS — I20.89 CHRONIC STABLE ANGINA: ICD-10-CM

## 2024-05-22 NOTE — PROGRESS NOTES
"Cardiology Office Visit      Encounter Date:  05/22/2024    Patient ID:   Elizabeth Momin is a 74 y.o. female.    Reason For Followup:  Shortness of breath  Fatigue    Brief Clinical History:  Dear Dr. Bryant, Lise Love MD    I had the pleasure of seeing Elizabeth Momin today. As you are well aware, this is a 74 y.o. female past medical history that is significant for history of hypertension history of hyperlipidemia family history of coronary artery disease abnormal elevated liver enzymes complaining of significant fatigue after tick bite last year with no improvement of the symptoms in the last 1 year    Interval History:  Significant fatigue  Exertional dyspnea  Functional limitation secondary to above symptoms  No chest discomfort  Strong family history of coronary artery disease and premature coronary artery disease  No recent cardiac evaluation or workup  Abnormal liver enzymes  Hyperlipidemia      Assessment & Plan    Impressions:  Hypertension  Hyperlipidemia  History of breast cancer s/p radiation therapy and s/p mastectomy  Exertional shortness of breath  Fatigue  Symptoms concerning for angina  Significant functional limitation to above symptoms  Abnormal elevated liver enzymes  Cholelithiasis      Recommendations:  Patient is scheduled to be seen by GI if there is no significant pathology and if it felt to be secondary to fatty liver we will restart a statin if not consider PCSK9 Lipitor for hyperlipidemia  Echocardiogram to assess LV systolic function rule out significant myocardial pathology or valve pathology contributing patient's symptoms  Schedule patient for a myocardial perfusion study for further risk stratification for patient's symptoms  Prior workup labs medications reviewed and discussed patient  Follow-up in office in 2 months        Vitals:  Vitals:    05/22/24 1030   BP: 101/72   Pulse: 103   SpO2: 99%   Weight: 69.9 kg (154 lb)   Height: 161.3 cm (63.5\")       Physical " Exam:    General: Alert, cooperative, no distress, appears stated age  Head:  Normocephalic, atraumatic, mucous membranes moist  Eyes:  Conjunctiva/corneas clear, EOM's intact     Neck:  Supple,  no adenopathy;      Lungs: Clear to auscultation bilaterally, no wheezes rhonchi rales are noted  Chest wall: No tenderness  Heart::  Regular rate and rhythm, S1 and S2 normal, no murmur, rub or gallop  Abdomen: Soft, non-tender, nondistended bowel sounds active  Extremities: No cyanosis, clubbing, or edema  Pulses: 2+ and symmetric all extremities  Skin:  No rashes or lesions  Neuro/psych: A&O x3. CN II through XII are grossly intact with appropriate affect              Lab Results   Component Value Date    GLUCOSE 112 (H) 05/10/2024    BUN 13 05/10/2024    CREATININE 0.98 05/10/2024    EGFR 60.3 05/10/2024    BCR 13.3 05/10/2024    K 4.3 05/10/2024    CO2 24.7 05/10/2024    CALCIUM 9.8 05/10/2024    PROTENTOTREF 7.1 11/08/2021    ALBUMIN 4.2 05/10/2024    BILITOT 0.6 05/10/2024    AST 57 (H) 05/10/2024    ALT 71 (H) 05/10/2024        No results found for this or any previous visit.     Lab Results   Component Value Date    CHOL 300 (H) 03/08/2024    CHLPL 257 (H) 11/08/2021    TRIG 172 (H) 03/08/2024    HDL 45 03/08/2024     (H) 03/08/2024                Objective:          Allergies:  No Known Allergies    Medication Review:     Current Outpatient Medications:     acetaminophen (TYLENOL) 650 MG 8 hr tablet, Take 1 tablet by mouth., Disp: , Rfl:     amitriptyline (ELAVIL) 50 MG tablet, TAKE 1 TABLET BY MOUTH EVERY NIGHT, Disp: 90 tablet, Rfl: 0    cetirizine (zyrTEC) 10 MG tablet, TAKE 1 TABLET BY MOUTH EVERY DAY, Disp: 90 tablet, Rfl: 3    glucosamine-chondroitin 500-400 MG capsule capsule, Take  by mouth 3 (Three) Times a Day With Meals., Disp: , Rfl:     ibuprofen (ADVIL,MOTRIN) 800 MG tablet, Take 1 tablet by mouth Every 6 (Six) Hours As Needed., Disp: , Rfl:     letrozole (FEMARA) 2.5 MG tablet, Take 1 tablet  by mouth Daily., Disp: , Rfl:     losartan (COZAAR) 100 MG tablet, TAKE 1 TABLET BY MOUTH DAILY, Disp: 90 tablet, Rfl: 0    montelukast (SINGULAIR) 10 MG tablet, Take 1 tablet by mouth every night at bedtime., Disp: 90 tablet, Rfl: 3    Multiple Vitamin (MULTI-VITAMIN) tablet, Take 1 tablet by mouth Daily., Disp: , Rfl:     Passion Flower-Valerian 500-500 MG capsule, Take  by mouth Daily., Disp: , Rfl:     Family History:  Family History   Problem Relation Age of Onset    Hypertension Mother     Stroke Mother     Diabetes Sister     Hypertension Sister     Mental illness Sister         Psychiatric Care - Paranoid Schizophrenia    Hypertension Brother     Cancer Other         Aunt Colon Cancer. Nephew Lung Cancer (heavy smoker).        Past Medical History:  Past Medical History:   Diagnosis Date    Allergic rhinitis     Cancer 2019    Had mastectomy in right breast. Followed with rediation    Chronic insomnia     Chronic sinusitis     Fibrocystic breast disease     History of skin cancer     Hx of radiation therapy 2019    right    Hyperlipidemia     Hypertension     Osteoarthritis     Osteopenia of multiple sites 2016       Past surgical History:  Past Surgical History:   Procedure Laterality Date    APPENDECTOMY  1968    BREAST BIOPSY Right 2019    Invasive Mammary Carcinoma    BREAST LUMPECTOMY       SECTION  1978    LYMPH NODE BIOPSY      During mastectomy    MASTECTOMY Right 10/02/2019    Fabrizio Bernabe    MASTECTOMY PARTIAL WITH AXILLARY LYMPH NODE EXCISION Right 2019    Dr. Storm Dinh    TOTAL ABDOMINAL HYSTERECTOMY WITH SALPINGO OOPHORECTOMY      Fibroids       Social History:  Social History     Socioeconomic History    Marital status:    Tobacco Use    Smoking status: Never    Smokeless tobacco: Never    Tobacco comments:     Passive Smoke Exposure: No    Vaping Use    Vaping status: Never Used   Substance and Sexual Activity    Alcohol use: No    Drug use: No    Sexual  activity: Not Currently     Partners: Male       Review of Systems:  The following systems were reviewed as they relate to the cardiovascular system: Constitutional, Eyes, ENT, Cardiovascular, Respiratory, Gastrointestinal, Integumentary, Neurological, Psychiatric, Hematologic, Endocrine, Musculoskeletal, and Genitourinary. The pertinent cardiovascular findings are reported above with all other cardiovascular points within those systems being negative.    Diagnostic Study Review:     Current Electrocardiogram:    ECG 12 Lead    Date/Time: 5/22/2024 11:06 AM  Performed by: Georges Sykes MD    Authorized by: Georges Sykes MD  Comparison: compared with previous ECG   Similar to previous ECG  Rhythm: sinus rhythm  Rate: normal  BPM: 103  Conduction: conduction normal  QRS axis: normal  Other findings: non-specific ST-T wave changes    Clinical impression: abnormal EKG                NOTE: The following portions of the patient's history were reviewed and updated this visit as appropriate: allergies, current medications, past family history, past medical history, past social history, past surgical history and problem list.

## 2024-06-12 ENCOUNTER — TRANSCRIBE ORDERS (OUTPATIENT)
Dept: ADMINISTRATIVE | Facility: HOSPITAL | Age: 74
End: 2024-06-12
Payer: MEDICARE

## 2024-06-12 DIAGNOSIS — Z78.0 POSTMENOPAUSAL: Primary | ICD-10-CM

## 2024-06-12 RX ORDER — LOSARTAN POTASSIUM 100 MG/1
100 TABLET ORAL DAILY
Qty: 90 TABLET | Refills: 0 | Status: SHIPPED | OUTPATIENT
Start: 2024-06-12

## 2024-06-15 ENCOUNTER — TRANSCRIBE ORDERS (OUTPATIENT)
Dept: ADMINISTRATIVE | Facility: HOSPITAL | Age: 74
End: 2024-06-15
Payer: MEDICARE

## 2024-06-15 DIAGNOSIS — Z12.31 SCREENING MAMMOGRAM FOR BREAST CANCER: Primary | ICD-10-CM

## 2024-06-17 ENCOUNTER — HOSPITAL ENCOUNTER (OUTPATIENT)
Dept: CARDIOLOGY | Facility: HOSPITAL | Age: 74
Discharge: HOME | End: 2024-06-17
Payer: MEDICARE

## 2024-06-17 VITALS
BODY MASS INDEX: 27.29 KG/M2 | DIASTOLIC BLOOD PRESSURE: 72 MMHG | WEIGHT: 154 LBS | SYSTOLIC BLOOD PRESSURE: 101 MMHG | HEIGHT: 63 IN

## 2024-06-17 DIAGNOSIS — I20.89 CHRONIC STABLE ANGINA: ICD-10-CM

## 2024-06-17 DIAGNOSIS — I10 PRIMARY HYPERTENSION: ICD-10-CM

## 2024-06-17 DIAGNOSIS — R06.02 SOB (SHORTNESS OF BREATH): ICD-10-CM

## 2024-06-17 DIAGNOSIS — R53.83 OTHER FATIGUE: ICD-10-CM

## 2024-06-17 DIAGNOSIS — E78.2 MIXED HYPERLIPIDEMIA: ICD-10-CM

## 2024-06-17 LAB
BH CV ECHO LEFT VENTRICLE GLOBAL LONGITUDINAL STRAIN: 18.6 %
BH CV ECHO MEAS - ACS: 1.85 CM
BH CV ECHO MEAS - AO MAX PG: 8.2 MMHG
BH CV ECHO MEAS - AO MEAN PG: 4.8 MMHG
BH CV ECHO MEAS - AO ROOT DIAM: 3.1 CM
BH CV ECHO MEAS - AO V2 MAX: 143.5 CM/SEC
BH CV ECHO MEAS - AO V2 VTI: 31 CM
BH CV ECHO MEAS - AVA(I,D): 1.39 CM2
BH CV ECHO MEAS - EDV(CUBED): 94 ML
BH CV ECHO MEAS - EDV(MOD-SP4): 52.6 ML
BH CV ECHO MEAS - EF(MOD-BP): 51 %
BH CV ECHO MEAS - EF(MOD-SP4): 51.1 %
BH CV ECHO MEAS - ESV(CUBED): 23.5 ML
BH CV ECHO MEAS - ESV(MOD-SP4): 25.7 ML
BH CV ECHO MEAS - FS: 37 %
BH CV ECHO MEAS - IVS/LVPW: 0.96 CM
BH CV ECHO MEAS - IVSD: 0.97 CM
BH CV ECHO MEAS - LA DIMENSION: 2.6 CM
BH CV ECHO MEAS - LV DIASTOLIC VOL/BSA (35-75): 30.4 CM2
BH CV ECHO MEAS - LV MASS(C)D: 153.6 GRAMS
BH CV ECHO MEAS - LV MAX PG: 3.1 MMHG
BH CV ECHO MEAS - LV MEAN PG: 1.72 MMHG
BH CV ECHO MEAS - LV SYSTOLIC VOL/BSA (12-30): 14.9 CM2
BH CV ECHO MEAS - LV V1 MAX: 87.7 CM/SEC
BH CV ECHO MEAS - LV V1 VTI: 17.6 CM
BH CV ECHO MEAS - LVIDD: 4.5 CM
BH CV ECHO MEAS - LVIDS: 2.9 CM
BH CV ECHO MEAS - LVOT AREA: 2.45 CM2
BH CV ECHO MEAS - LVOT DIAM: 1.77 CM
BH CV ECHO MEAS - LVPWD: 1.01 CM
BH CV ECHO MEAS - MR MAX PG: 68.1 MMHG
BH CV ECHO MEAS - MR MAX VEL: 410.7 CM/SEC
BH CV ECHO MEAS - MV A MAX VEL: 80.2 CM/SEC
BH CV ECHO MEAS - MV DEC SLOPE: 302.8 CM/SEC2
BH CV ECHO MEAS - MV DEC TIME: 0.21 SEC
BH CV ECHO MEAS - MV E MAX VEL: 63.7 CM/SEC
BH CV ECHO MEAS - MV E/A: 0.79
BH CV ECHO MEAS - MV MAX PG: 3.8 MMHG
BH CV ECHO MEAS - MV MEAN PG: 1.33 MMHG
BH CV ECHO MEAS - MV V2 VTI: 22.9 CM
BH CV ECHO MEAS - MVA(VTI): 1.89 CM2
BH CV ECHO MEAS - PA ACC TIME: 0.07 SEC
BH CV ECHO MEAS - PA V2 MAX: 77.2 CM/SEC
BH CV ECHO MEAS - PI END-D VEL: 91.2 CM/SEC
BH CV ECHO MEAS - PULM DIAS VEL: 29.7 CM/SEC
BH CV ECHO MEAS - PULM S/D: 1.48
BH CV ECHO MEAS - PULM SYS VEL: 44.1 CM/SEC
BH CV ECHO MEAS - RAP SYSTOLE: 3 MMHG
BH CV ECHO MEAS - RV MAX PG: 2.44 MMHG
BH CV ECHO MEAS - RV V1 MAX: 78 CM/SEC
BH CV ECHO MEAS - RV V1 VTI: 15.3 CM
BH CV ECHO MEAS - RVDD: 2.9 CM
BH CV ECHO MEAS - SV(LVOT): 43.2 ML
BH CV ECHO MEAS - SV(MOD-SP4): 26.9 ML
BH CV ECHO MEAS - SVI(LVOT): 25 ML/M2
BH CV ECHO MEAS - SVI(MOD-SP4): 15.5 ML/M2
BH CV ECHO MEAS - TR MAX PG: 23.3 MMHG
BH CV ECHO MEAS - TR MAX VEL: 241.2 CM/SEC
BH CV REST NUCLEAR ISOTOPE DOSE: 10.5 MCI
BH CV STRESS COMMENTS STAGE 1: NORMAL
BH CV STRESS DOSE REGADENOSON STAGE 1: 0.4
BH CV STRESS DURATION MIN STAGE 1: 0
BH CV STRESS DURATION SEC STAGE 1: 10
BH CV STRESS GRADE STAGE 1: 10
BH CV STRESS METS STAGE 1: 5
BH CV STRESS NUCLEAR ISOTOPE DOSE: 32.5 MCI
BH CV STRESS PROTOCOL 1: NORMAL
BH CV STRESS RECOVERY BP: NORMAL MMHG
BH CV STRESS RECOVERY HR: 88 BPM
BH CV STRESS SPEED STAGE 1: 1.7
BH CV STRESS STAGE 1: 1
LV EF NUC BP: 72 %
MAXIMAL PREDICTED HEART RATE: 145 BPM
PERCENT MAX PREDICTED HR: 64.14 %
STRESS BASELINE BP: NORMAL MMHG
STRESS BASELINE HR: 75 BPM
STRESS PERCENT HR: 75 %
STRESS POST PEAK BP: NORMAL MMHG
STRESS POST PEAK HR: 93 BPM
STRESS TARGET HR: 123 BPM

## 2024-06-17 PROCEDURE — A9500 TC99M SESTAMIBI: HCPCS | Performed by: INTERNAL MEDICINE

## 2024-06-17 PROCEDURE — 93018 CV STRESS TEST I&R ONLY: CPT | Performed by: INTERNAL MEDICINE

## 2024-06-17 PROCEDURE — 25010000002 REGADENOSON 0.4 MG/5ML SOLUTION: Performed by: INTERNAL MEDICINE

## 2024-06-17 PROCEDURE — 93306 TTE W/DOPPLER COMPLETE: CPT

## 2024-06-17 PROCEDURE — 93017 CV STRESS TEST TRACING ONLY: CPT

## 2024-06-17 PROCEDURE — 0 TECHNETIUM SESTAMIBI: Performed by: INTERNAL MEDICINE

## 2024-06-17 PROCEDURE — 93306 TTE W/DOPPLER COMPLETE: CPT | Performed by: INTERNAL MEDICINE

## 2024-06-17 PROCEDURE — 93356 MYOCRD STRAIN IMG SPCKL TRCK: CPT | Performed by: INTERNAL MEDICINE

## 2024-06-17 PROCEDURE — 93356 MYOCRD STRAIN IMG SPCKL TRCK: CPT

## 2024-06-17 PROCEDURE — 93016 CV STRESS TEST SUPVJ ONLY: CPT | Performed by: NURSE PRACTITIONER

## 2024-06-17 PROCEDURE — 78452 HT MUSCLE IMAGE SPECT MULT: CPT

## 2024-06-17 PROCEDURE — 78452 HT MUSCLE IMAGE SPECT MULT: CPT | Performed by: INTERNAL MEDICINE

## 2024-06-17 RX ORDER — REGADENOSON 0.08 MG/ML
0.4 INJECTION, SOLUTION INTRAVENOUS
Status: COMPLETED | OUTPATIENT
Start: 2024-06-17 | End: 2024-06-17

## 2024-06-17 RX ADMIN — TECHNETIUM TC 99M SESTAMIBI 1 DOSE: 1 INJECTION INTRAVENOUS at 09:23

## 2024-06-17 RX ADMIN — REGADENOSON 0.4 MG: 0.08 INJECTION, SOLUTION INTRAVENOUS at 10:53

## 2024-06-17 RX ADMIN — TECHNETIUM TC 99M SESTAMIBI 1 DOSE: 1 INJECTION INTRAVENOUS at 10:53

## 2024-07-31 ENCOUNTER — OFFICE VISIT (OUTPATIENT)
Dept: CARDIOLOGY | Facility: CLINIC | Age: 74
End: 2024-07-31
Payer: MEDICARE

## 2024-07-31 VITALS
BODY MASS INDEX: 27.29 KG/M2 | OXYGEN SATURATION: 95 % | DIASTOLIC BLOOD PRESSURE: 78 MMHG | HEIGHT: 63 IN | WEIGHT: 154 LBS | SYSTOLIC BLOOD PRESSURE: 129 MMHG | HEART RATE: 88 BPM

## 2024-07-31 DIAGNOSIS — I10 PRIMARY HYPERTENSION: Primary | ICD-10-CM

## 2024-07-31 DIAGNOSIS — E78.2 MIXED HYPERLIPIDEMIA: ICD-10-CM

## 2024-07-31 DIAGNOSIS — R73.03 PREDIABETES: ICD-10-CM

## 2024-07-31 PROCEDURE — 3078F DIAST BP <80 MM HG: CPT | Performed by: INTERNAL MEDICINE

## 2024-07-31 PROCEDURE — 99214 OFFICE O/P EST MOD 30 MIN: CPT | Performed by: INTERNAL MEDICINE

## 2024-07-31 PROCEDURE — 1160F RVW MEDS BY RX/DR IN RCRD: CPT | Performed by: INTERNAL MEDICINE

## 2024-07-31 PROCEDURE — 1159F MED LIST DOCD IN RCRD: CPT | Performed by: INTERNAL MEDICINE

## 2024-07-31 PROCEDURE — 3074F SYST BP LT 130 MM HG: CPT | Performed by: INTERNAL MEDICINE

## 2024-07-31 NOTE — PROGRESS NOTES
"Cardiology Office Visit      Encounter Date:  07/31/2024    Patient ID:   Elizabeth Momin is a 74 y.o. female.    Reason For Followup:  Shortness of breath  Fatigue    Brief Clinical History:  Dear Dr. Bryant, Lise Love MD    I had the pleasure of seeing Elizabeth Momin today. As you are well aware, this is a 74 y.o. female past medical history that is significant for history of hypertension history of hyperlipidemia family history of coronary artery disease abnormal elevated liver enzymes complaining of significant fatigue after tick bite last year with no improvement of the symptoms     Interval History:  Significant fatigue  Exertional dyspnea  Functional limitation secondary to above symptoms  No chest discomfort  Strong family history of coronary artery disease and premature coronary artery disease  No recent cardiac evaluation or workup  Abnormal liver enzymes  Hyperlipidemia      Assessment & Plan    Impressions:  Hypertension  Hyperlipidemia  History of breast cancer s/p radiation therapy and s/p mastectomy  Exertional shortness of breath  Fatigue  Symptoms concerning for angina  Significant functional limitation to above symptoms  Abnormal elevated liver enzymes  Cholelithiasis  Echocardiogram with normal LV systolic function with no significant valve pathology  Myocardial perfusion study with no significant reversible ischemia    Recommendations:  Patient is scheduled to be seen by GI if there is no significant pathology and if it felt to be secondary to fatty liver we will restart a statin if not consider PCSK9 Lipitor for hyperlipidemia  Results of echocardiogram reviewed and discussed with patient  Prior workup labs medications reviewed and discussed patient  Follow-up in office in 6 months        Vitals:  Vitals:    07/31/24 0951   BP: 129/78   Pulse: 88   SpO2: 95%   Weight: 69.9 kg (154 lb)   Height: 160 cm (63\")       Physical Exam:    General: Alert, cooperative, no distress, appears stated " age  Head:  Normocephalic, atraumatic, mucous membranes moist  Eyes:  Conjunctiva/corneas clear, EOM's intact     Neck:  Supple,  no adenopathy;      Lungs: Clear to auscultation bilaterally, no wheezes rhonchi rales are noted  Chest wall: No tenderness  Heart::  Regular rate and rhythm, S1 and S2 normal, no murmur, rub or gallop  Abdomen: Soft, non-tender, nondistended bowel sounds active  Extremities: No cyanosis, clubbing, or edema  Pulses: 2+ and symmetric all extremities  Skin:  No rashes or lesions  Neuro/psych: A&O x3. CN II through XII are grossly intact with appropriate affect              Lab Results   Component Value Date    GLUCOSE 112 (H) 05/10/2024    BUN 13 05/10/2024    CREATININE 0.98 05/10/2024    EGFR 60.3 05/10/2024    BCR 13.3 05/10/2024    K 4.3 05/10/2024    CO2 24.7 05/10/2024    CALCIUM 9.8 05/10/2024    PROTENTOTREF 7.1 11/08/2021    ALBUMIN 4.2 05/10/2024    BILITOT 0.6 05/10/2024    AST 57 (H) 05/10/2024    ALT 71 (H) 05/10/2024     Results for orders placed during the hospital encounter of 06/17/24    Adult Transthoracic Echo Complete W/ Cont if Necessary Per Protocol    Interpretation Summary    Left ventricular systolic function is normal. Calculated left ventricular EF = 51% Left ventricular ejection fraction appears to be 51 - 55%.    Left ventricular diastolic function is consistent with (grade I) impaired relaxation.    The left atrial cavity is mildly dilated.    Estimated right ventricular systolic pressure from tricuspid regurgitation is normal (<35 mmHg).     No results found for this or any previous visit.     Lab Results   Component Value Date    CHOL 300 (H) 03/08/2024    CHLPL 257 (H) 11/08/2021    TRIG 172 (H) 03/08/2024    HDL 45 03/08/2024     (H) 03/08/2024      Results for orders placed during the hospital encounter of 06/17/24    Stress Test With Myocardial Perfusion One Day    Interpretation Summary    Myocardial perfusion imaging indicates a normal myocardial  perfusion study with no evidence of ischemia. Impressions are consistent with a low risk study.    Left ventricular ejection fraction is normal.            Objective:          Allergies:  No Known Allergies    Medication Review:     Current Outpatient Medications:     acetaminophen (TYLENOL) 650 MG 8 hr tablet, Take 1 tablet by mouth., Disp: , Rfl:     amitriptyline (ELAVIL) 50 MG tablet, TAKE 1 TABLET BY MOUTH EVERY NIGHT, Disp: 90 tablet, Rfl: 0    cetirizine (zyrTEC) 10 MG tablet, TAKE 1 TABLET BY MOUTH EVERY DAY, Disp: 90 tablet, Rfl: 3    glucosamine-chondroitin 500-400 MG capsule capsule, Take  by mouth 3 (Three) Times a Day With Meals., Disp: , Rfl:     ibuprofen (ADVIL,MOTRIN) 800 MG tablet, Take 1 tablet by mouth Every 6 (Six) Hours As Needed., Disp: , Rfl:     letrozole (FEMARA) 2.5 MG tablet, Take 1 tablet by mouth Daily., Disp: , Rfl:     losartan (COZAAR) 100 MG tablet, TAKE 1 TABLET BY MOUTH DAILY, Disp: 90 tablet, Rfl: 0    montelukast (SINGULAIR) 10 MG tablet, Take 1 tablet by mouth every night at bedtime., Disp: 90 tablet, Rfl: 3    Multiple Vitamin (MULTI-VITAMIN) tablet, Take 1 tablet by mouth Daily., Disp: , Rfl:     Passion Flower-Valerian 500-500 MG capsule, Take  by mouth Daily., Disp: , Rfl:     Family History:  Family History   Problem Relation Age of Onset    Hypertension Mother     Stroke Mother     Diabetes Sister     Hypertension Sister     Mental illness Sister         Psychiatric Care - Paranoid Schizophrenia    Hypertension Brother     Cancer Other         Aunt Colon Cancer. Nephew Lung Cancer (heavy smoker).        Past Medical History:  Past Medical History:   Diagnosis Date    Allergic rhinitis     Cancer 2019    Had mastectomy in right breast. Followed with rediation    Chronic insomnia     Chronic sinusitis     Fibrocystic breast disease     History of skin cancer     Hx of radiation therapy 2019    right    Hyperlipidemia     Hypertension     Osteoarthritis     Osteopenia of  multiple sites 2016       Past surgical History:  Past Surgical History:   Procedure Laterality Date    APPENDECTOMY  1968    BREAST BIOPSY Right 2019    Invasive Mammary Carcinoma    BREAST LUMPECTOMY       SECTION  1978    LYMPH NODE BIOPSY      During mastectomy    MASTECTOMY Right 10/02/2019    Fabrizio Bernabe    MASTECTOMY PARTIAL WITH AXILLARY LYMPH NODE EXCISION Right 2019    Dr. Storm Dinh    TOTAL ABDOMINAL HYSTERECTOMY WITH SALPINGO OOPHORECTOMY      Fibroids       Social History:  Social History     Socioeconomic History    Marital status:    Tobacco Use    Smoking status: Never    Smokeless tobacco: Never    Tobacco comments:     Passive Smoke Exposure: No    Vaping Use    Vaping status: Never Used   Substance and Sexual Activity    Alcohol use: No    Drug use: No    Sexual activity: Not Currently     Partners: Male       Review of Systems:  The following systems were reviewed as they relate to the cardiovascular system: Constitutional, Eyes, ENT, Cardiovascular, Respiratory, Gastrointestinal, Integumentary, Neurological, Psychiatric, Hematologic, Endocrine, Musculoskeletal, and Genitourinary. The pertinent cardiovascular findings are reported above with all other cardiovascular points within those systems being negative.    Diagnostic Study Review:     Current Electrocardiogram:  Procedures          NOTE: The following portions of the patient's history were reviewed and updated this visit as appropriate: allergies, current medications, past family history, past medical history, past social history, past surgical history and problem list.

## 2024-09-04 ENCOUNTER — OFFICE VISIT (OUTPATIENT)
Dept: FAMILY MEDICINE CLINIC | Facility: CLINIC | Age: 74
End: 2024-09-04
Payer: MEDICARE

## 2024-09-04 ENCOUNTER — LAB (OUTPATIENT)
Dept: FAMILY MEDICINE CLINIC | Facility: CLINIC | Age: 74
End: 2024-09-04
Payer: MEDICARE

## 2024-09-04 VITALS
HEIGHT: 63 IN | SYSTOLIC BLOOD PRESSURE: 151 MMHG | BODY MASS INDEX: 27.61 KG/M2 | OXYGEN SATURATION: 96 % | WEIGHT: 155.8 LBS | TEMPERATURE: 97.8 F | HEART RATE: 85 BPM | DIASTOLIC BLOOD PRESSURE: 89 MMHG

## 2024-09-04 DIAGNOSIS — M25.561 CHRONIC PAIN OF BOTH KNEES: ICD-10-CM

## 2024-09-04 DIAGNOSIS — L98.9 SKIN LESION OF FACE: Primary | ICD-10-CM

## 2024-09-04 DIAGNOSIS — G89.29 CHRONIC PAIN OF BOTH KNEES: ICD-10-CM

## 2024-09-04 DIAGNOSIS — J30.9 ALLERGIC RHINITIS, UNSPECIFIED SEASONALITY, UNSPECIFIED TRIGGER: ICD-10-CM

## 2024-09-04 DIAGNOSIS — M25.562 CHRONIC PAIN OF BOTH KNEES: ICD-10-CM

## 2024-09-04 DIAGNOSIS — R79.89 ELEVATED LIVER FUNCTION TESTS: ICD-10-CM

## 2024-09-04 LAB
ALBUMIN SERPL-MCNC: 4.5 G/DL (ref 3.5–5.2)
ALBUMIN/GLOB SERPL: 1.5 G/DL
ALP SERPL-CCNC: 47 U/L (ref 39–117)
ALT SERPL W P-5'-P-CCNC: 78 U/L (ref 1–33)
ANION GAP SERPL CALCULATED.3IONS-SCNC: 12 MMOL/L (ref 5–15)
AST SERPL-CCNC: 72 U/L (ref 1–32)
BILIRUB SERPL-MCNC: 0.7 MG/DL (ref 0–1.2)
BUN SERPL-MCNC: 18 MG/DL (ref 8–23)
BUN/CREAT SERPL: 19.1 (ref 7–25)
CALCIUM SPEC-SCNC: 10.3 MG/DL (ref 8.6–10.5)
CHLORIDE SERPL-SCNC: 103 MMOL/L (ref 98–107)
CO2 SERPL-SCNC: 24 MMOL/L (ref 22–29)
CREAT SERPL-MCNC: 0.94 MG/DL (ref 0.57–1)
EGFRCR SERPLBLD CKD-EPI 2021: 63.4 ML/MIN/1.73
GLOBULIN UR ELPH-MCNC: 3.1 GM/DL
GLUCOSE SERPL-MCNC: 111 MG/DL (ref 65–99)
POTASSIUM SERPL-SCNC: 3.8 MMOL/L (ref 3.5–5.2)
PROT SERPL-MCNC: 7.6 G/DL (ref 6–8.5)
SODIUM SERPL-SCNC: 139 MMOL/L (ref 136–145)

## 2024-09-04 PROCEDURE — 3079F DIAST BP 80-89 MM HG: CPT | Performed by: NURSE PRACTITIONER

## 2024-09-04 PROCEDURE — 1125F AMNT PAIN NOTED PAIN PRSNT: CPT | Performed by: NURSE PRACTITIONER

## 2024-09-04 PROCEDURE — 80053 COMPREHEN METABOLIC PANEL: CPT | Performed by: NURSE PRACTITIONER

## 2024-09-04 PROCEDURE — 36415 COLL VENOUS BLD VENIPUNCTURE: CPT | Performed by: NURSE PRACTITIONER

## 2024-09-04 PROCEDURE — 99214 OFFICE O/P EST MOD 30 MIN: CPT | Performed by: NURSE PRACTITIONER

## 2024-09-04 PROCEDURE — 3077F SYST BP >= 140 MM HG: CPT | Performed by: NURSE PRACTITIONER

## 2024-09-04 NOTE — PROGRESS NOTES
"Subjective   Elizabeth Momin is a 74 y.o. female presents for   Chief Complaint   Patient presents with    spot on face     Has a small area below her right eye that is dry and raw. States she can't get it to heal.    Knee Pain     Right knee hurts and the left one \"clicks\"       Health Maintenance Due   Topic Date Due    ZOSTER VACCINE (1 of 2) Never done    RSV Vaccine - Adults (1 - 1-dose 60+ series) Never done    DXA SCAN  07/06/2024    COVID-19 Vaccine (4 - 2023-24 season) 09/01/2024    INFLUENZA VACCINE  08/01/2024       History of Present Illness  The patient is a 74-year-old female who presents for evaluation of multiple medical concerns.    She has been dealing with a persistent, crusty, and tender spot on her face for several weeks. Despite attempts to seek medical attention at two different facilities, she was unable to receive treatment due to one not accepting new patients and the other having a long waiting list. Various home remedies have been tried without success, and she is seeking immediate medical attention due to the chronic nature and tenderness of the spot on her right cheek.    She has been experiencing knee pain, which was discussed with Dr. Bryant during her physical examination earlier this year. Despite being given exercises, her condition did not improve. Both knees are affected, with the pain worsening when she bends over. Occasionally, she hears a clicking sound in her left knee when walking. The pain is constant, and she has been taking Advil for relief, even though she was advised against it. She is considering further medical consultation or imaging studies to investigate the cause of her pain.    She has been trying to consult a hepatologist as recommended by Dr. Bryant due to elevated liver enzymes. However, she missed her appointment on 09/19/2024 and has not received any communication from the clinic since. She has managed to secure an appointment for Tuesday and is " "requesting to gett blood work-up done beforehand.    She underwent a comprehensive cardiac evaluation as a precautionary measure due to a family history of heart attacks. The results were satisfactory, except for her cholesterol levels. Consequently, she was taken off her cholesterol medication due to potential liver effects and fatigue. She also discontinued her lisinopril. She is scheduled to see her oncologist in 01/2025.    She is seeking advice on how to reduce puffiness around her eyes. She experiences significant mucus production at night and suffers from seasonal allergies.    FAMILY HISTORY  She has a family history of heart attacks.    Vitals:    09/04/24 0811   BP: 151/89   BP Location: Left arm   Patient Position: Sitting   Cuff Size: Adult   Pulse: 85   Temp: 97.8 °F (36.6 °C)   TempSrc: Tympanic   SpO2: 96%   Weight: 70.7 kg (155 lb 12.8 oz)   Height: 160 cm (62.99\")     Body mass index is 27.61 kg/m².    Current Outpatient Medications on File Prior to Visit   Medication Sig Dispense Refill    acetaminophen (TYLENOL) 650 MG 8 hr tablet Take 1 tablet by mouth.      amitriptyline (ELAVIL) 50 MG tablet TAKE 1 TABLET BY MOUTH EVERY NIGHT 90 tablet 0    cetirizine (zyrTEC) 10 MG tablet TAKE 1 TABLET BY MOUTH EVERY DAY 90 tablet 3    glucosamine-chondroitin 500-400 MG capsule capsule Take  by mouth 3 (Three) Times a Day With Meals.      ibuprofen (ADVIL,MOTRIN) 800 MG tablet Take 1 tablet by mouth Every 6 (Six) Hours As Needed.      losartan (COZAAR) 100 MG tablet TAKE 1 TABLET BY MOUTH DAILY 90 tablet 0    Multiple Vitamin (MULTI-VITAMIN) tablet Take 1 tablet by mouth Daily.      [DISCONTINUED] letrozole (FEMARA) 2.5 MG tablet Take 1 tablet by mouth Daily. (Patient not taking: Reported on 9/4/2024)      [DISCONTINUED] montelukast (SINGULAIR) 10 MG tablet Take 1 tablet by mouth every night at bedtime. (Patient not taking: Reported on 9/4/2024) 90 tablet 3    [DISCONTINUED] Passion Flower-Valerian 500-500 MG " capsule Take  by mouth Daily. (Patient not taking: Reported on 9/4/2024)       No current facility-administered medications on file prior to visit.       The following portions of the patient's history were reviewed and updated as appropriate: allergies, current medications, past family history, past medical history, past social history, past surgical history, and problem list.    Review of Systems   Musculoskeletal:         Bilateral knee pain   Skin:  Positive for skin lesions (right cheek).       Objective   Physical Exam  Vitals and nursing note reviewed.   Constitutional:       Appearance: Normal appearance. She is well-developed.   HENT:      Head: Normocephalic and atraumatic.        Right Ear: External ear normal.      Left Ear: External ear normal.      Nose: Nose normal.   Eyes:      Extraocular Movements: Extraocular movements intact.      Conjunctiva/sclera: Conjunctivae normal.      Pupils: Pupils are equal, round, and reactive to light.   Cardiovascular:      Rate and Rhythm: Normal rate and regular rhythm.      Pulses: Normal pulses.      Heart sounds: Normal heart sounds.   Pulmonary:      Effort: Pulmonary effort is normal.      Breath sounds: Normal breath sounds.   Abdominal:      General: Bowel sounds are normal.      Palpations: Abdomen is soft.   Genitourinary:     Vagina: Normal.   Musculoskeletal:         General: Normal range of motion.      Cervical back: Normal range of motion and neck supple.      Right knee: Crepitus present. Tenderness present over the lateral joint line.      Instability Tests: Anterior drawer test negative. Posterior drawer test negative. Medial Grey test negative and lateral Grey test negative.      Left knee: Crepitus present.      Instability Tests: Anterior drawer test negative. Posterior drawer test negative. Medial Grey test negative and lateral Grey test negative.   Skin:     General: Skin is warm and dry.   Neurological:      General: No focal  deficit present.      Mental Status: She is alert and oriented to person, place, and time.   Psychiatric:         Mood and Affect: Mood normal.         Behavior: Behavior normal.         Judgment: Judgment normal.              PHQ-9 Total Score:      Results      Assessment & Plan   Diagnoses and all orders for this visit:    1. Skin lesion of face (Primary)  -     Ambulatory Referral to Dermatology    2. Elevated liver function tests  -     Comprehensive metabolic panel    3. Chronic pain of both knees  -     XR Knee 3 View Bilateral; Future  -     Ambulatory Referral to Orthopedic Surgery    4. Allergic rhinitis, unspecified seasonality, unspecified trigger         1. Facial lesion.  The facial lesion is chronic, tender, and crusty, and has not improved with various treatments. A referral to Dr. Flanagan, a dermatologist, will be made for further evaluation and management. The office is located on [facility] in [place].    2. Bilateral knee pain.  The bilateral knee pain is persistent, worsens with bending, and sometimes causes clicking. X-rays of both knees will be ordered to determine the underlying cause. A referral to an orthopedic specialist in [place] will also be made for further evaluation and management.    3. Elevated liver enzymes.  Liver function tests will be rechecked today to monitor the elevated liver enzymes. The results will be available by tomorrow.    4. Hypercholesterolemia.  The patient has a history of elevated cholesterol levels. She was taken off cholesterol medication due to concerns about its impact on her liver and tiredness. No new cholesterol medication will be prescribed at this time.    5. Allergies.  The puffiness below her eyes may be due to congestion and allergies. Nasacort nasal spray will be used to alleviate the congestion and potentially reduce the swelling. If there is no improvement with the Nasacort nasal spray, alternative treatments will be considered.    6.  Medication Management.  She has stopped taking lisinopril on her own. Encouraged to monitor blood pressure at home and resume if bp remains elevated.      There are no Patient Instructions on file for this visit.         Patient or patient representative verbalized consent for the use of Ambient Listening during the visit with  LEO Callejas for chart documentation. 9/4/2024  09:30 EDT

## 2024-09-05 ENCOUNTER — OFFICE VISIT (OUTPATIENT)
Age: 74
End: 2024-09-05
Payer: MEDICARE

## 2024-09-05 ENCOUNTER — HOSPITAL ENCOUNTER (OUTPATIENT)
Dept: GENERAL RADIOLOGY | Facility: HOSPITAL | Age: 74
Discharge: HOME | End: 2024-09-05
Admitting: NURSE PRACTITIONER
Payer: MEDICARE

## 2024-09-05 VITALS — HEIGHT: 62 IN | OXYGEN SATURATION: 97 % | BODY MASS INDEX: 28.52 KG/M2 | WEIGHT: 155 LBS | RESPIRATION RATE: 20 BRPM

## 2024-09-05 DIAGNOSIS — M25.561 CHRONIC PAIN OF BOTH KNEES: ICD-10-CM

## 2024-09-05 DIAGNOSIS — G89.29 CHRONIC PAIN OF BOTH KNEES: ICD-10-CM

## 2024-09-05 DIAGNOSIS — M17.0 BILATERAL PRIMARY OSTEOARTHRITIS OF KNEE: Primary | ICD-10-CM

## 2024-09-05 DIAGNOSIS — M25.562 CHRONIC PAIN OF BOTH KNEES: ICD-10-CM

## 2024-09-05 PROCEDURE — 73562 X-RAY EXAM OF KNEE 3: CPT

## 2024-09-05 NOTE — PROGRESS NOTES
[facility]        Patient Name: Elizabeth Momin  : 1950  Primary Care Physician: Lise Bryant MD        Chief Complaint:    Chief Complaint   Patient presents with    Left Knee - Pain, Initial Evaluation     Popping pain       Right Knee - Pain, Initial Evaluation     Crunching pain   Constant pain         HPI:   Elizabeth Momin is a 74 y.o. year old who presents today for evaluation of bilateral knee pain.    Pain onset was > 1 year ago and gradually worsening. Pain with increased activity. 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 She currently takes OTC Tylenol, Advil and uses topical essential oils with mild to moderate pain relief.     No recent injury. No knee surgeries.       Past Medical/Surgical, Social and Family History:  I have reviewed and/or updated pertinent history as noted in the medical record including:  Past Medical History:   Diagnosis Date    Allergic rhinitis     Cancer 2019    Had mastectomy in right breast. Followed with rediation    Chronic insomnia     Chronic sinusitis     Fibrocystic breast disease     History of skin cancer     Hx of radiation therapy 2019    right    Hyperlipidemia     Hypertension     Osteoarthritis     Osteopenia of multiple sites 2016     Past Surgical History:   Procedure Laterality Date    APPENDECTOMY  1968    BREAST BIOPSY Right 2019    Invasive Mammary Carcinoma    BREAST LUMPECTOMY       SECTION  1978    LYMPH NODE BIOPSY      During mastectomy    MASTECTOMY Right 10/02/2019    Fabrizio Bernabe    MASTECTOMY PARTIAL WITH AXILLARY LYMPH NODE EXCISION Right 2019    Dr. Storm Dinh    TOTAL ABDOMINAL HYSTERECTOMY WITH SALPINGO OOPHORECTOMY      Fibroids     Social History     Occupational History    Not on file   Tobacco Use    Smoking status: Never    Smokeless tobacco: Never    Tobacco comments:     Passive Smoke Exposure: No    Vaping Use    Vaping status: Never Used   Substance and Sexual Activity     Alcohol use: No    Drug use: No    Sexual activity: Not Currently     Partners: Male          Allergies: No Known Allergies    Medications:   Home Medications:  Current Outpatient Medications on File Prior to Visit   Medication Sig    acetaminophen (TYLENOL) 650 MG 8 hr tablet Take 1 tablet by mouth.    amitriptyline (ELAVIL) 50 MG tablet TAKE 1 TABLET BY MOUTH EVERY NIGHT    cetirizine (zyrTEC) 10 MG tablet TAKE 1 TABLET BY MOUTH EVERY DAY    glucosamine-chondroitin 500-400 MG capsule capsule Take  by mouth 3 (Three) Times a Day With Meals.    ibuprofen (ADVIL,MOTRIN) 800 MG tablet Take 1 tablet by mouth Every 6 (Six) Hours As Needed.    losartan (COZAAR) 100 MG tablet TAKE 1 TABLET BY MOUTH DAILY    Multiple Vitamin (MULTI-VITAMIN) tablet Take 1 tablet by mouth Daily.     No current facility-administered medications on file prior to visit.         ROS:  Negative unless listed in the HPI    Physical Exam:   74 y.o. female  Body mass index is 28.35 kg/m²., 70.3 kg (155 lb)  Vitals:    09/05/24 1242   Resp: 20   SpO2: 97%     General: Alert, cooperative, appears well and in no observable distress.   HEENT: Normocephalic, atraumatic on external visual inspection. No icterus.   CV: No significant peripheral edema.    Respiratory: Normal respiratory effort.   Skin: Warm & well perfused; appropriate skin turgor.  Psych: Appropriate mood & affect.  Neuro: Gross sensation and motor intact in affected extremity/extremities.        Radiology:      Assessment:  Bilateral knee osteoarthritis   Body mass index is 28.35 kg/m².  BMI consistent with Overweight: 25.0-29.9kg/m2            Plan:  I have reviewed the above imaging and assessment with the patient in detail    Discussed treatment options including topical pain creams, prescription oral anti inflammatories, steroid injections, VISCO and surgery    Based on symptoms, I would recommend beginning with topical pain cream and adding steroid injection if symptoms  persist/worsen    E scribed Rx alternative compound    Follow up in 3 months or sooner if necessary     Patient encouraged to call with any questions or concerns in the interim    Anjelica Mcfarland, APRN

## 2024-09-10 ENCOUNTER — OFFICE (OUTPATIENT)
Age: 74
End: 2024-09-10

## 2024-09-10 ENCOUNTER — OFFICE (OUTPATIENT)
Dept: URBAN - METROPOLITAN AREA CLINIC 64 | Facility: CLINIC | Age: 74
End: 2024-09-10

## 2024-09-10 VITALS
HEIGHT: 64 IN | WEIGHT: 154 LBS | SYSTOLIC BLOOD PRESSURE: 126 MMHG | SYSTOLIC BLOOD PRESSURE: 126 MMHG | SYSTOLIC BLOOD PRESSURE: 126 MMHG | HEIGHT: 64 IN | WEIGHT: 154 LBS | HEIGHT: 64 IN | SYSTOLIC BLOOD PRESSURE: 126 MMHG | HEART RATE: 102 BPM | SYSTOLIC BLOOD PRESSURE: 126 MMHG | SYSTOLIC BLOOD PRESSURE: 126 MMHG | DIASTOLIC BLOOD PRESSURE: 87 MMHG | DIASTOLIC BLOOD PRESSURE: 87 MMHG | HEART RATE: 102 BPM | DIASTOLIC BLOOD PRESSURE: 87 MMHG | HEIGHT: 64 IN | WEIGHT: 154 LBS | HEART RATE: 102 BPM | WEIGHT: 154 LBS | WEIGHT: 154 LBS | HEART RATE: 102 BPM | HEIGHT: 64 IN | DIASTOLIC BLOOD PRESSURE: 87 MMHG | HEART RATE: 102 BPM | WEIGHT: 154 LBS | HEART RATE: 102 BPM | WEIGHT: 154 LBS | DIASTOLIC BLOOD PRESSURE: 87 MMHG | SYSTOLIC BLOOD PRESSURE: 126 MMHG | DIASTOLIC BLOOD PRESSURE: 87 MMHG | HEIGHT: 64 IN | HEART RATE: 102 BPM | DIASTOLIC BLOOD PRESSURE: 87 MMHG | HEIGHT: 64 IN

## 2024-09-10 DIAGNOSIS — Z12.11 ENCOUNTER FOR SCREENING FOR MALIGNANT NEOPLASM OF COLON: ICD-10-CM

## 2024-09-10 DIAGNOSIS — Z80.0 FAMILY HISTORY OF MALIGNANT NEOPLASM OF DIGESTIVE ORGANS: ICD-10-CM

## 2024-09-10 DIAGNOSIS — R74.8 ABNORMAL LEVELS OF OTHER SERUM ENZYMES: ICD-10-CM

## 2024-09-10 DIAGNOSIS — R10.11 RIGHT UPPER QUADRANT PAIN: ICD-10-CM

## 2024-09-10 DIAGNOSIS — K76.0 FATTY (CHANGE OF) LIVER, NOT ELSEWHERE CLASSIFIED: ICD-10-CM

## 2024-09-10 PROCEDURE — 99203 OFFICE O/P NEW LOW 30 MIN: CPT

## 2024-09-10 RX ORDER — LOSARTAN POTASSIUM 100 MG/1
100 TABLET ORAL DAILY
Qty: 90 TABLET | Refills: 0 | Status: SHIPPED | OUTPATIENT
Start: 2024-09-10

## 2024-09-11 ENCOUNTER — TRANSCRIBE ORDERS (OUTPATIENT)
Dept: ADMINISTRATIVE | Facility: HOSPITAL | Age: 74
End: 2024-09-11
Payer: MEDICARE

## 2024-09-11 DIAGNOSIS — K76.0 HEPATIC STEATOSIS: ICD-10-CM

## 2024-09-11 DIAGNOSIS — R74.8 ELEVATED LIVER ENZYMES: Primary | ICD-10-CM

## 2024-09-11 DIAGNOSIS — R10.11 ABDOMINAL PAIN, RUQ: ICD-10-CM

## 2024-09-19 ENCOUNTER — HOSPITAL ENCOUNTER (OUTPATIENT)
Dept: ULTRASOUND IMAGING | Facility: HOSPITAL | Age: 74
Discharge: HOME | End: 2024-09-19
Payer: MEDICARE

## 2024-09-19 DIAGNOSIS — K76.0 HEPATIC STEATOSIS: ICD-10-CM

## 2024-09-19 DIAGNOSIS — R10.11 ABDOMINAL PAIN, RUQ: ICD-10-CM

## 2024-09-19 DIAGNOSIS — R74.8 ELEVATED LIVER ENZYMES: ICD-10-CM

## 2024-09-19 PROCEDURE — 76705 ECHO EXAM OF ABDOMEN: CPT

## 2024-11-21 ENCOUNTER — OFFICE (OUTPATIENT)
Age: 74
End: 2024-11-21
Payer: MEDICARE

## 2024-11-21 ENCOUNTER — OFFICE (OUTPATIENT)
Dept: URBAN - METROPOLITAN AREA CLINIC 64 | Facility: CLINIC | Age: 74
End: 2024-11-21
Payer: MEDICARE

## 2024-11-21 VITALS
HEIGHT: 64 IN | SYSTOLIC BLOOD PRESSURE: 126 MMHG | WEIGHT: 157 LBS | HEIGHT: 64 IN | WEIGHT: 157 LBS | SYSTOLIC BLOOD PRESSURE: 126 MMHG | SYSTOLIC BLOOD PRESSURE: 126 MMHG | WEIGHT: 157 LBS | HEART RATE: 95 BPM | HEIGHT: 64 IN | HEART RATE: 95 BPM | DIASTOLIC BLOOD PRESSURE: 78 MMHG | HEIGHT: 64 IN | HEART RATE: 95 BPM | DIASTOLIC BLOOD PRESSURE: 78 MMHG | SYSTOLIC BLOOD PRESSURE: 126 MMHG | DIASTOLIC BLOOD PRESSURE: 78 MMHG | HEART RATE: 95 BPM | SYSTOLIC BLOOD PRESSURE: 126 MMHG | HEART RATE: 95 BPM | DIASTOLIC BLOOD PRESSURE: 78 MMHG | HEART RATE: 95 BPM | WEIGHT: 157 LBS | DIASTOLIC BLOOD PRESSURE: 78 MMHG | DIASTOLIC BLOOD PRESSURE: 78 MMHG | HEIGHT: 64 IN | WEIGHT: 157 LBS | HEIGHT: 64 IN | HEART RATE: 95 BPM | SYSTOLIC BLOOD PRESSURE: 126 MMHG | DIASTOLIC BLOOD PRESSURE: 78 MMHG | WEIGHT: 157 LBS | SYSTOLIC BLOOD PRESSURE: 126 MMHG | HEIGHT: 64 IN | WEIGHT: 157 LBS

## 2024-11-21 DIAGNOSIS — K76.0 FATTY (CHANGE OF) LIVER, NOT ELSEWHERE CLASSIFIED: ICD-10-CM

## 2024-11-21 DIAGNOSIS — R74.8 ABNORMAL LEVELS OF OTHER SERUM ENZYMES: ICD-10-CM

## 2024-11-21 DIAGNOSIS — Z80.0 FAMILY HISTORY OF MALIGNANT NEOPLASM OF DIGESTIVE ORGANS: ICD-10-CM

## 2024-11-21 DIAGNOSIS — Z12.11 ENCOUNTER FOR SCREENING FOR MALIGNANT NEOPLASM OF COLON: ICD-10-CM

## 2024-11-21 PROCEDURE — 99213 OFFICE O/P EST LOW 20 MIN: CPT

## 2024-12-04 ENCOUNTER — TELEPHONE (OUTPATIENT)
Dept: ORTHOPEDIC SURGERY | Facility: CLINIC | Age: 74
End: 2024-12-04
Payer: MEDICARE

## 2024-12-04 NOTE — TELEPHONE ENCOUNTER
Caller: RAMÓN    Relationship to patient: SELF    Best call back number: [phone]    Chief complaint: RIGHT KNEE PAIN    Type of visit: HAS APPT 12/5/24 IS SICK- WONDERED IF SHE COULD RESCHEDULE THAT APPT TO TELEHEALTH- PLEASE CALL

## 2024-12-09 RX ORDER — LOSARTAN POTASSIUM 100 MG/1
100 TABLET ORAL DAILY
Qty: 90 TABLET | Refills: 0 | Status: SHIPPED | OUTPATIENT
Start: 2024-12-09

## 2024-12-12 ENCOUNTER — OFFICE VISIT (OUTPATIENT)
Age: 74
End: 2024-12-12
Payer: MEDICARE

## 2024-12-12 VITALS — OXYGEN SATURATION: 97 % | WEIGHT: 155 LBS | HEIGHT: 62 IN | BODY MASS INDEX: 28.52 KG/M2 | RESPIRATION RATE: 20 BRPM

## 2024-12-12 DIAGNOSIS — M17.0 BILATERAL PRIMARY OSTEOARTHRITIS OF KNEE: Primary | ICD-10-CM

## 2024-12-12 NOTE — PROGRESS NOTES
"FOLLOW UP VISIT        Patient Name: Elizabeth Momin  : 1950  Primary Care Physician: Lise Bryant MD        Chief Complaint:  bilateral knee pain    HPI:   Elizabeth Momin is a 74 y.o. year old who presents today for follow up of the above. At her last visit, she was provided with Rx alternative script. Unfortunately, her insurance would not cover this, so she did not receive it. She continue OTC meds which provide moderate relief of symptoms. Her right knee is the most bothersome for her, especially in the am hours. Increased stiffness and trouble \"getting going.\"     She is interested in a right knee steroid injection, but would like to wait until after the holidays to receive.       Past Medical/Surgical, Social and Family History:  I have reviewed and/or updated pertinent history as noted in the medical record including:  Past Medical History:   Diagnosis Date    Allergic rhinitis     Cancer 2019    Had mastectomy in right breast. Followed with rediation    Chronic insomnia     Chronic sinusitis     Fibrocystic breast disease     History of skin cancer     Hx of radiation therapy 2019    right    Hyperlipidemia     Hypertension     Osteoarthritis     Osteopenia of multiple sites 2016     Past Surgical History:   Procedure Laterality Date    APPENDECTOMY  1968    BREAST BIOPSY Right 2019    Invasive Mammary Carcinoma    BREAST LUMPECTOMY       SECTION      LYMPH NODE BIOPSY      During mastectomy    MASTECTOMY Right 10/02/2019    Fabrizio Bernabe    MASTECTOMY PARTIAL WITH AXILLARY LYMPH NODE EXCISION Right 2019    Dr. Storm Dinh    TOTAL ABDOMINAL HYSTERECTOMY WITH SALPINGO OOPHORECTOMY      Fibroids     Social History     Occupational History    Not on file   Tobacco Use    Smoking status: Never    Smokeless tobacco: Never    Tobacco comments:     Passive Smoke Exposure: No    Vaping Use    Vaping status: Never Used   Substance and Sexual Activity    Alcohol use: " No    Drug use: No    Sexual activity: Not Currently     Partners: Male      Social History     Social History Narrative    Not on file     Family History   Problem Relation Age of Onset    Hypertension Mother     Stroke Mother     Diabetes Sister     Hypertension Sister     Mental illness Sister         Psychiatric Care - Paranoid Schizophrenia    Hypertension Brother     Cancer Other         Aunt Colon Cancer. Nephew Lung Cancer (heavy smoker).        Allergies: No Known Allergies    Medications:   Home Medications:  Current Outpatient Medications on File Prior to Visit   Medication Sig    acetaminophen (TYLENOL) 650 MG 8 hr tablet Take 1 tablet by mouth.    amitriptyline (ELAVIL) 50 MG tablet TAKE 1 TABLET BY MOUTH EVERY NIGHT    cetirizine (zyrTEC) 10 MG tablet TAKE 1 TABLET BY MOUTH EVERY DAY    glucosamine-chondroitin 500-400 MG capsule capsule Take  by mouth 3 (Three) Times a Day With Meals.    ibuprofen (ADVIL,MOTRIN) 800 MG tablet Take 1 tablet by mouth Every 6 (Six) Hours As Needed.    Ibuprofen 3 %, Gabapentin 10 %, Baclofen 2 %, lidocaine 4 % Apply 1-2 g topically to the appropriate area as directed 3 (Three) to 4 (Four) times daily.    losartan (COZAAR) 100 MG tablet TAKE 1 TABLET BY MOUTH DAILY    Multiple Vitamin (MULTI-VITAMIN) tablet Take 1 tablet by mouth Daily.     No current facility-administered medications on file prior to visit.         ROS:  14 point review of systems was negative except as listed in the HPI     Physical Exam:   74 y.o. female  Body mass index is 28.35 kg/m²., 70.3 kg (155 lb)  Vitals:    12/12/24 1108   Resp: 20   SpO2: 97%         General: Alert, cooperative, appears well and in no observable distress.   HEENT: Normocephalic, atraumatic on external visual inspection. No icterus.   CV: No significant peripheral edema.   Respiratory: Normal respiratory effort.   Skin: Warm & well perfused; appropriate skin turgor.  Psych: Appropriate mood & affect.  Neuro: Gross sensation and  motor intact in affected extremity/extremities.  Vascular: Peripheral pulses palpable in affected extremity/extremities. Calves/compartments soft and non tender, no evidence of DVT or compartment syndrome.        Investigations:  XR KNEE 3 VW BILATERAL     Date of Exam: 9/5/2024 12:13 PM EDT     Indication: bilaateral knee pain     Comparison: None available.     FINDINGS:  Right:  There is tricompartmental osteophytosis. There is moderate medial tibiofemoral joint space narrowing. No significant knee effusion. No displaced fracture is identified. Mineralization and alignment appear within normal limits.     Left:  There is tricompartmental osteophytosis. There is mild medial tibiofemoral and patellofemoral joint space narrowing. No significant knee effusion. No displaced fracture is identified. Mineralization and alignment appear within normal limits.     IMPRESSION:  Bilateral tricompartmental osteoarthritis with moderate medial tibiofemoral joint space narrowing on the right.           Electronically Signed: Storm Breaux    9/8/2024 1:37 PM EDT    Workstation ID: [id number]             Assessment:  Bilateral knee osteoarthritis  Right knee pain  Body mass index is 28.35 kg/m².  BMI consistent with Overweight: 25.0-29.9kg/m2              Plan:  Will plan for right knee steroid injection after the holidays  Continue OTC meds for now  Patient encouraged to call with questions or concerns in the interim      LEO Hewitt

## 2024-12-16 ENCOUNTER — HOSPITAL ENCOUNTER (OUTPATIENT)
Dept: BONE DENSITY | Facility: HOSPITAL | Age: 74
Discharge: HOME | End: 2024-12-16
Payer: MEDICARE

## 2024-12-16 ENCOUNTER — HOSPITAL ENCOUNTER (OUTPATIENT)
Dept: MAMMOGRAPHY | Facility: HOSPITAL | Age: 74
Discharge: HOME | End: 2024-12-16
Payer: MEDICARE

## 2024-12-16 DIAGNOSIS — Z12.31 SCREENING MAMMOGRAM FOR BREAST CANCER: ICD-10-CM

## 2024-12-16 DIAGNOSIS — Z78.0 POSTMENOPAUSAL: ICD-10-CM

## 2024-12-16 PROCEDURE — 77067 SCR MAMMO BI INCL CAD: CPT

## 2024-12-16 PROCEDURE — 77063 BREAST TOMOSYNTHESIS BI: CPT

## 2024-12-16 PROCEDURE — 77080 DXA BONE DENSITY AXIAL: CPT

## 2024-12-31 ENCOUNTER — TELEPHONE (OUTPATIENT)
Dept: FAMILY MEDICINE CLINIC | Facility: CLINIC | Age: 74
End: 2024-12-31
Payer: MEDICARE

## 2024-12-31 NOTE — TELEPHONE ENCOUNTER
"HUB TO RELAY    \"We would need to listen to your chest before any antibiotics or steroids could be prescribed. We would be glad to see you on Thursday or Friday as the office is not open tomorrow and we are full with appointments today. If you need to be seen sooner you can go to an urgent care. \"  "

## 2024-12-31 NOTE — TELEPHONE ENCOUNTER
Caller: Elizabeth Momin    Relationship: Self    Best call back number: [phone]        What was the call regarding: PATIENT CALLING STATES SHE HAS BEEN FIGHTING BRONCHITIS FOR THE PAST MONTH AND IN THE LAST 2 DAYS ITS GOTTEN WORSE     PATIENT WANTED TO SEE IF DR LARKIN COULD CALL IN AN ANTIBIOTIC OR SOMETHING TO HELP.    PATIENT STATES SHE THINKS SHE WOULD GET BETTER IF SHE HAD A STEROID     [id number] - [address] AT [place] [address] - [phone] [facility] [phone] FX     PLEASE ADVISE

## 2024-12-31 NOTE — TELEPHONE ENCOUNTER
We would need to listen to your chest before any antibiotics or steroids could be prescribed.  We would be glad to see you on Thursday or Friday as the office is not open tomorrow and we are full with appointments today.  If you need to be seen sooner you can go to an urgent care.

## 2025-01-02 ENCOUNTER — OFFICE VISIT (OUTPATIENT)
Age: 75
End: 2025-01-02
Payer: MEDICARE

## 2025-01-02 VITALS — HEIGHT: 62 IN | RESPIRATION RATE: 20 BRPM | BODY MASS INDEX: 28.52 KG/M2 | OXYGEN SATURATION: 97 % | WEIGHT: 155 LBS

## 2025-01-02 DIAGNOSIS — M17.0 BILATERAL PRIMARY OSTEOARTHRITIS OF KNEE: Primary | ICD-10-CM

## 2025-01-02 RX ORDER — TRIAMCINOLONE ACETONIDE 40 MG/ML
80 INJECTION, SUSPENSION INTRA-ARTICULAR; INTRAMUSCULAR
Status: COMPLETED | OUTPATIENT
Start: 2025-01-02 | End: 2025-01-02

## 2025-01-02 RX ORDER — LIDOCAINE HYDROCHLORIDE 10 MG/ML
2 INJECTION, SOLUTION INFILTRATION; PERINEURAL
Status: COMPLETED | OUTPATIENT
Start: 2025-01-02 | End: 2025-01-02

## 2025-01-02 RX ADMIN — LIDOCAINE HYDROCHLORIDE 2 ML: 10 INJECTION, SOLUTION INFILTRATION; PERINEURAL at 11:54

## 2025-01-02 RX ADMIN — TRIAMCINOLONE ACETONIDE 80 MG: 40 INJECTION, SUSPENSION INTRA-ARTICULAR; INTRAMUSCULAR at 11:54

## 2025-01-02 RX ADMIN — LIDOCAINE HYDROCHLORIDE 2 ML: 10 INJECTION, SOLUTION INFILTRATION; PERINEURAL at 11:53

## 2025-01-02 RX ADMIN — TRIAMCINOLONE ACETONIDE 80 MG: 40 INJECTION, SUSPENSION INTRA-ARTICULAR; INTRAMUSCULAR at 11:53

## 2025-01-02 NOTE — PROGRESS NOTES
INJECTION VISIT    Patient: Elizabeth Momin    YOB: 1950    MRN: [id number]    Chief Complaint   Patient presents with    Right Knee - Follow-up          Left Knee - Follow-up       History of Present Illness:   Elizabeth Momin is a 74 y.o. year old who presents today for injection of the bilateral knees. No changes since her last visit. This will be her first steroid injection(s).         Allergies: No Known Allergies    Medications:   Home Medications:  Current Outpatient Medications on File Prior to Visit   Medication Sig    acetaminophen (TYLENOL) 650 MG 8 hr tablet Take 1 tablet by mouth.    amitriptyline (ELAVIL) 50 MG tablet TAKE 1 TABLET BY MOUTH EVERY NIGHT    cetirizine (zyrTEC) 10 MG tablet TAKE 1 TABLET BY MOUTH EVERY DAY    glucosamine-chondroitin 500-400 MG capsule capsule Take  by mouth 3 (Three) Times a Day With Meals.    ibuprofen (ADVIL,MOTRIN) 800 MG tablet Take 1 tablet by mouth Every 6 (Six) Hours As Needed.    Ibuprofen 3 %, Gabapentin 10 %, Baclofen 2 %, lidocaine 4 % Apply 1-2 g topically to the appropriate area as directed 3 (Three) to 4 (Four) times daily.    losartan (COZAAR) 100 MG tablet TAKE 1 TABLET BY MOUTH DAILY    Multiple Vitamin (MULTI-VITAMIN) tablet Take 1 tablet by mouth Daily.     No current facility-administered medications on file prior to visit.         I have reviewed the patient's medical history in detail and updated the computerized patient record.  Review and summarization of old records include:    Past Medical History:   Diagnosis Date    Allergic rhinitis     Cancer 2019    Had mastectomy in right breast. Followed with rediation    Chronic insomnia     Chronic sinusitis     Fibrocystic breast disease     History of skin cancer     Hx of radiation therapy 2019    right    Hyperlipidemia     Hypertension     Osteoarthritis     Osteopenia of multiple sites 05/05/2016     Past Surgical History:   Procedure Laterality Date    APPENDECTOMY  1968    BREAST  BIOPSY Right 2019    Invasive Mammary Carcinoma    BREAST LUMPECTOMY       SECTION  1978    LYMPH NODE BIOPSY      During mastectomy    MASTECTOMY Right 10/02/2019    Fabrizio Bernabe    MASTECTOMY PARTIAL WITH AXILLARY LYMPH NODE EXCISION Right 2019    Dr. Storm Dinh    TOTAL ABDOMINAL HYSTERECTOMY WITH SALPINGO OOPHORECTOMY  1995    Fibroids     Social History     Occupational History    Not on file   Tobacco Use    Smoking status: Never    Smokeless tobacco: Never    Tobacco comments:     Passive Smoke Exposure: No    Vaping Use    Vaping status: Never Used   Substance and Sexual Activity    Alcohol use: No    Drug use: No    Sexual activity: Not Currently     Partners: Male      Social History     Social History Narrative    Not on file     Family History   Problem Relation Age of Onset    Hypertension Mother     Stroke Mother     Diabetes Sister     Hypertension Sister     Mental illness Sister         Psychiatric Care - Paranoid Schizophrenia    Hypertension Brother     Cancer Other         Aunt Colon Cancer. Nephew Lung Cancer (heavy smoker).                ROS  Negative unless listed in the HPI        Physical Exam  74 y.o. female  Body mass index is 28.35 kg/m²., 70.3 kg (155 lb)  Vitals:    25 1125   Resp: 20   SpO2: 97%     General: Alert, cooperative, appears well and in no observable distress.   HEENT: Normocephalic, atraumatic on external visual inspection. No icterus.   CV: No significant peripheral edema.   Respiratory: Normal respiratory effort.   Skin: Warm & well perfused; appropriate skin turgor.  Psych: Appropriate mood & affect.  Neuro: Gross sensation and motor intact in affected extremity/extremities.  Vascular: Peripheral pulses palpable in affected extremity/extremities.         Procedure:  Large Joint Arthrocentesis: R knee  Date/Time: 2025 11:53 AM  Consent given by: patient  Site marked: site marked  Timeout: Immediately prior to procedure a time out was called to  verify the correct patient, procedure, equipment, support staff and site/side marked as required   Supporting Documentation  Indications: pain and diagnostic evaluation   Procedure Details  Location: knee - R knee  Needle size: 25 G  Approach: anterolateral  Medications administered: 2 mL lidocaine 1 %; 80 mg triamcinolone acetonide 40 MG/ML  Patient tolerance: patient tolerated the procedure well with no immediate complications      Large Joint Arthrocentesis: L knee  Date/Time: 1/2/2025 11:54 AM  Consent given by: patient  Site marked: site marked  Timeout: Immediately prior to procedure a time out was called to verify the correct patient, procedure, equipment, support staff and site/side marked as required   Supporting Documentation  Indications: pain and diagnostic evaluation   Procedure Details  Location: knee - L knee  Needle size: 25 G  Approach: anterolateral  Medications administered: 2 mL lidocaine 1 %; 80 mg triamcinolone acetonide 40 MG/ML  Patient tolerance: patient tolerated the procedure well with no immediate complications            Assessment:   Diagnoses and all orders for this visit:    1. Bilateral primary osteoarthritis of knee (Primary)      Body mass index is 28.35 kg/m².  BMI consistent with Overweight: 25.0-29.9kg/m2         Plan:   -     Risks and benefits of injection therapy discussed with patient.  Possibility of bruising, pain, swelling, infection, increased blood sugar levels, cortisol flare and soft tissue atrophy discussed in detail.  Injected patient's bilateral knee joint(s)with Kenalog from an anterolateral approach   Compression/brace   Rest, ice, compression, and elevation (RICE) therapy  OTC Tylenol for pain PRN  Follow up in 3 months or PRN   Please call with questions or concerns in the interim        Date of encounter: 01/02/2025   LEO Hewitt

## 2025-02-14 RX ORDER — MONTELUKAST SODIUM 10 MG/1
10 TABLET ORAL
Qty: 90 TABLET | Refills: 3 | OUTPATIENT
Start: 2025-02-14

## 2025-02-14 RX ORDER — AMITRIPTYLINE HYDROCHLORIDE 50 MG/1
50 TABLET ORAL NIGHTLY
Qty: 90 TABLET | Refills: 0 | Status: SHIPPED | OUTPATIENT
Start: 2025-02-14

## 2025-03-09 PROBLEM — K80.20 GALLSTONES: Status: ACTIVE | Noted: 2025-03-09

## 2025-03-09 PROBLEM — K76.0 HEPATIC STEATOSIS: Status: ACTIVE | Noted: 2025-03-09

## 2025-03-09 PROBLEM — C80.1 CANCER: Status: ACTIVE | Noted: 2025-03-09

## 2025-03-09 PROBLEM — Z80.0 FAMILY HISTORY OF LIVER CANCER: Status: ACTIVE | Noted: 2025-03-09

## 2025-03-09 NOTE — PROGRESS NOTES
Subjective   The ABCs of the Annual Wellness Visit  Medicare Wellness Visit      Elizabeth Momin is a 75 y.o. patient who presents for a Medicare Wellness Visit.    The following portions of the patient's history were reviewed and   updated as appropriate: allergies, current medications, past family history, past medical history, past social history, past surgical history, and problem list.    Compared to one year ago, the patient's physical   health is the same.  Compared to one year ago, the patient's mental   health is the same.    Recent Hospitalizations:  She was not admitted to the hospital during the last year.     Current Medical Providers:  Patient Care Team:  Lise Bryant MD as PCP - General (Family Medicine)  Eugene Stockton MD as Consulting Physician (Hematology and Oncology)  Jevon López MD as Consulting Physician (Radiation Oncology)    Outpatient Medications Prior to Visit   Medication Sig Dispense Refill    acetaminophen (TYLENOL) 650 MG 8 hr tablet Take 1 tablet by mouth.      amitriptyline (ELAVIL) 50 MG tablet TAKE 1 TABLET BY MOUTH EVERY NIGHT 90 tablet 0    glucosamine-chondroitin 500-400 MG capsule capsule Take  by mouth 3 (Three) Times a Day With Meals.      Multiple Vitamin (MULTI-VITAMIN) tablet Take 1 tablet by mouth Daily.      cetirizine (zyrTEC) 10 MG tablet TAKE 1 TABLET BY MOUTH EVERY DAY 90 tablet 3    losartan (COZAAR) 100 MG tablet TAKE 1 TABLET BY MOUTH DAILY 90 tablet 0    ibuprofen (ADVIL,MOTRIN) 800 MG tablet Take 1 tablet by mouth Every 6 (Six) Hours As Needed. (Patient not taking: Reported on 3/11/2025)      Ibuprofen 3 %, Gabapentin 10 %, Baclofen 2 %, lidocaine 4 % Apply 1-2 g topically to the appropriate area as directed 3 (Three) to 4 (Four) times daily. (Patient not taking: Reported on 3/11/2025) 90 g 3     No facility-administered medications prior to visit.     No opioid medication identified on active medication list. I have reviewed chart for other  "potential  high risk medication/s and harmful drug interactions in the elderly.      Aspirin is not on active medication list.  Aspirin use is not indicated based on review of current medical condition/s. Risk of harm outweighs potential benefits.  .    Patient Active Problem List   Diagnosis    Allergic rhinitis    Hyperlipidemia    Hypertension    Chronic insomnia    Mixed anxiety depressive disorder    Osteopenia of multiple sites    Overweight with body mass index (BMI) of 28 to 28.9 in adult    Chronic sinusitis    Osteoarthritis    S/P right mastectomy    Stage II lobular carcinoma of breast, ER+, right    Prediabetes    Elevated liver enzymes    Flank pain    History of skin cancer    Herpes zoster with complication    Tick bite of scalp    Cancer    Family history of liver cancer    Gallstones    Hepatic steatosis    Chronic bilateral low back pain with right-sided sciatica    Chronic pain of both knees     Advance Care Planning Advance Directive is not on file.  ACP discussion was held with the patient during this visit. Patient has an advance directive (not in EMR), copy requested.            Objective   Vitals:    03/11/25 1023 03/11/25 1031   BP: 131/76 120/86   BP Location: Left arm Left arm   Patient Position: Sitting Standing   Cuff Size: Adult Adult   Pulse: 87    Temp: 98.6 °F (37 °C)    TempSrc: Infrared    SpO2: 96%    Weight: 70.8 kg (156 lb)    Height: 157.5 cm (62.01\")    PainSc: 0-No pain        Estimated body mass index is 28.53 kg/m² as calculated from the following:    Height as of this encounter: 157.5 cm (62.01\").    Weight as of this encounter: 70.8 kg (156 lb).    BMI is >= 25 and <30. (Overweight) The following options were offered after discussion;: exercise counseling/recommendations and nutrition counseling/recommendations           Does the patient have evidence of cognitive impairment? No                                                                                              "   Health  Risk Assessment    Smoking Status:  Social History     Tobacco Use   Smoking Status Never    Passive exposure: Never   Smokeless Tobacco Never   Tobacco Comments    Passive Smoke Exposure: No      Alcohol Consumption:  Social History     Substance and Sexual Activity   Alcohol Use No       Fall Risk Screen  DIANEADI Fall Risk Assessment was completed, and patient is at LOW risk for falls.Assessment completed on:3/11/2025    Depression Screening   Little interest or pleasure in doing things? Not at all   Feeling down, depressed, or hopeless? Not at all   PHQ-2 Total Score 0      Health Habits and Functional and Cognitive Screening:      3/11/2025    10:26 AM   Functional & Cognitive Status   Do you have difficulty preparing food and eating? No   Do you have difficulty bathing yourself, getting dressed or grooming yourself? No   Do you have difficulty using the toilet? No   Do you have difficulty moving around from place to place? No   Do you have trouble with steps or getting out of a bed or a chair? No   Current Diet Well Balanced Diet   Dental Exam Not up to date   Eye Exam Not up to date   Exercise (times per week) 0 times per week   Current Exercises Include No Regular Exercise   Do you need help using the phone?  No   Are you deaf or do you have serious difficulty hearing?  No   Do you need help to go to places out of walking distance? No   Do you need help shopping? No   Do you need help preparing meals?  No   Do you need help with housework?  No   Do you need help with laundry? No   Do you need help taking your medications? No   Do you need help managing money? No   Do you ever drive or ride in a car without wearing a seat belt? No   Have you felt unusual stress, anger or loneliness in the last month? No   Who do you live with? Spouse   If you need help, do you have trouble finding someone available to you? No   Have you been bothered in the last four weeks by sexual problems? No   Do you have  difficulty concentrating, remembering or making decisions? Yes           Age-appropriate Screening Schedule:  Refer to the list below for future screening recommendations based on patient's age, sex and/or medical conditions. Orders for these recommended tests are listed in the plan section. The patient has been provided with a written plan.    Health Maintenance List  Health Maintenance   Topic Date Due    RSV Vaccine - Adults (1 - 1-dose 75+ series) Never done    INFLUENZA VACCINE  07/01/2024    COVID-19 Vaccine (4 - 2024-25 season) 09/01/2024    ZOSTER VACCINE (2 of 2) 09/19/2024    LIPID PANEL  03/08/2025    COLORECTAL CANCER SCREENING  06/27/2025 (Originally 2/16/1994)    ANNUAL WELLNESS VISIT  03/11/2026    BMI FOLLOWUP  03/11/2026    DXA SCAN  12/16/2026    TDAP/TD VACCINES (3 - Td or Tdap) 08/02/2028    HEPATITIS C SCREENING  Completed    Pneumococcal Vaccine 50+  Completed    MAMMOGRAM  Discontinued                                                                                                                                                CMS Preventative Services Quick Reference  Risk Factors Identified During Encounter  Fall Risk-High or Moderate: Discussed Fall Prevention in the home    The above risks/problems have been discussed with the patient.  Pertinent information has been shared with the patient in the After Visit Summary.  An After Visit Summary and PPPS were made available to the patient.    Follow Up:   Next Medicare Wellness visit to be scheduled in 1 year.         Additional E&M Note during same encounter follows:  Patient has additional, significant, and separately identifiable condition(s)/problem(s) that require work above and beyond the Medicare Wellness Visit     Chief Complaint  Annual Exam (To discuss liver) and Knee Pain (Both knees)    Subjective    HPI    Patient wanted to discuss liver medicine and biopsy and may need second opinion from GI     The patient is a 75-year-old female  who is here today for her annual wellness exam and Medicare exam, hyperlipidemia, hypertension, mixed anxiety and depression, osteopenia, overweight with a body mass index of 28, prediabetes, history of skin cancer, chronic insomnia, flank pain, vitamin D deficiency, chronic pain in both knees, family history of liver cancer, and elevated transaminase.    She has been diagnosed with metabolic dysfunction associated with steatosis and has been advised to start medication. She retains her gallbladder and has a known gallstone. A liver biopsy has been recommended as the next step in her management. She reports no changes in vision or hearing but acknowledges the need for a consultation due to the onset of cataracts. She does not experience any difficulty swallowing or digestive issues. She has discontinued ibuprofen and occasionally takes Tylenol at night. She also takes amitriptyline and Benadryl as needed for congestion. She has fasted in preparation for blood tests to monitor her liver function and blood glucose levels. She reports feeling fatigued and has noticed hair loss. She has not required hospitalization in the past year and does not take aspirin regularly. She has an advanced directive in place. She has been advised to discontinue her cholesterol medication due to its potential impact on her liver. Her last cholesterol reading was 224. She is not experiencing any back pain and has an upcoming appointment with a dermatologist. She is currently taking cholecalciferol and glucosamine chondroitin.    She has been experiencing ear congestion and significant drainage, which she attributes to the discontinuation of her allergy medication. She has been managing these symptoms with Benadryl in the evening and cetirizine in the morning. She has previously consulted an ENT specialist and has a prescription for Flonase nasal spray, which she has not yet used. She reports no hemoptysis, headaches, wheezing, chest  "pressure, or palpitations.    She has been attempting to reduce her carbohydrate intake and overall food consumption. She has been unable to maintain a daily walking routine due to knee pain and fatigue.    She has been taking amitriptyline for her anxiety and depression and also takes Nutri-Calm every morning. She has informed her gastroenterologist about this supplement, but they have not provided any feedback regarding its safety for her liver.    She has been unable to achieve 20 minutes of walking per day due to exhaustion. She walks with a closed closet.    FAMILY HISTORY  The patient has a family history of liver cancer.    MEDICATIONS  Current: Benadryl, cetirizine, amitriptyline, Nutri-Calm, cholecalciferol, glucosamine chondroitin.  Discontinued: Ibuprofen.  Review of Systems   HENT:          Left ear fullness   Gastrointestinal:  Positive for abdominal pain.   Genitourinary:  Positive for flank pain.   Musculoskeletal:  Positive for arthralgias and gait problem.   Psychiatric/Behavioral:  Positive for dysphoric mood and sleep disturbance. The patient is nervous/anxious.           Objective   Vital Signs:  /86 (BP Location: Left arm, Patient Position: Standing, Cuff Size: Adult)   Pulse 87   Temp 98.6 °F (37 °C) (Infrared)   Ht 157.5 cm (62.01\")   Wt 70.8 kg (156 lb)   SpO2 96%   BMI 28.53 kg/m²   Physical Exam  Vitals reviewed.   Constitutional:       General: She is not in acute distress.     Appearance: Normal appearance. She is well-developed. She is not ill-appearing or toxic-appearing.   HENT:      Head: Normocephalic and atraumatic.      Right Ear: Tympanic membrane, ear canal and external ear normal.      Left Ear: Tympanic membrane, ear canal and external ear normal.      Nose: Nose normal.      Mouth/Throat:      Mouth: Mucous membranes are moist.      Pharynx: No posterior oropharyngeal erythema.   Eyes:      Extraocular Movements: Extraocular movements intact.      " Conjunctiva/sclera: Conjunctivae normal.      Pupils: Pupils are equal, round, and reactive to light.   Neck:      Vascular: No carotid bruit.   Cardiovascular:      Rate and Rhythm: Normal rate and regular rhythm.      Heart sounds: Normal heart sounds.   Pulmonary:      Effort: Pulmonary effort is normal.      Breath sounds: Normal breath sounds.   Abdominal:      General: Bowel sounds are normal. There is no distension.      Palpations: Abdomen is soft. There is no mass.      Tenderness: There is no abdominal tenderness.   Musculoskeletal:         General: Tenderness present. Normal range of motion.      Cervical back: Neck supple. No tenderness.   Lymphadenopathy:      Cervical: No cervical adenopathy.   Skin:     General: Skin is warm.   Neurological:      General: No focal deficit present.      Mental Status: She is alert and oriented to person, place, and time.   Psychiatric:         Mood and Affect: Mood normal.         Behavior: Behavior normal.           Throat appears normal.  Lungs are clear.  Heart rhythm is regular and the rate is 85.            Results             Assessment and Plan        1. Metabolic dysfunction associated with steatosis.  She has been informed about the potential side effects of the proposed medication, including diarrhea, nausea, liver enzyme elevation, itching, vomiting, constipation, abdominal pain, dizziness, bilirubin elevation, T4 elevation, worsening liver problems, gallbladder disease, and pancreatitis. She has been advised to consult with her gastroenterologist regarding the initiation of this medication. If she is unable to secure an appointment with Dr. Mcdonald, she will inform us so that we can facilitate an appointment with either Debi or Dr. Chavez. She has been advised to discuss the possibility of taking Zetia with her gastroenterologist. She has also been encouraged to inquire about Nutri-Calm and to explore the [place] website for information on alternative  medicines.    2. Ear congestion.  She has been advised to use Flonase nasal spray at night instead of Benadryl. If this does not alleviate her symptoms, a referral to an ENT specialist will be considered.    3. Prediabetes.  She has been advised to continue monitoring her carbohydrate intake and to maintain a balanced diet. A hemoglobin A1c test will be conducted to assess her 3-month average blood sugar level.    4. Mixed anxiety and depression.  She is currently taking amitriptyline and Nutri-Calm. She has been advised to discuss the use of Nutri-Calm with her gastroenterologist.    5. Osteopenia.  She has been advised to continue her daily walking routine for bone health. A vitamin D level test will be conducted to assess her bone health.    6. Hyperlipidemia.  She has been advised to discuss the possibility of taking Zetia with her gastroenterologist as it is not a statin and may help lower her cholesterol levels.    7. Chronic insomnia.  She has been advised to continue her current medication regimen and to follow up with her healthcare provider if her symptoms persist.    8. History of skin cancer.  She has an appointment with her dermatologist next week.    9. Vitamin D deficiency.  She has been advised to continue taking her calcium and vitamin D supplements. A vitamin D level test will be conducted to assess her bone health.    10. Chronic pain in both knees.  She has been advised to continue her current pain management regimen and to follow up with her healthcare provider if her symptoms worsen.    11. Hypertension.    12. Overweight with a body mass index of 28.    13. Flank pain.    14. Elevated transaminase.         Follow Up   No follow-ups on file.  Patient was given instructions and counseling regarding her condition or for health maintenance advice. Please see specific information pulled into the AVS if appropriate.  Patient or patient representative verbalized consent for the use of Ambient  Listening during the visit with  Lise Bryant MD for chart documentation. 3/11/2025  14:44 EDT

## 2025-03-09 NOTE — ASSESSMENT & PLAN NOTE
Patient's (There is no height or weight on file to calculate BMI.) indicates that they are overweight with health conditions that include hypertension, impaired fasting glucose, and dyslipidemias . Weight is unchanged. BMI is above average; BMI management plan is completed. We discussed portion control and increasing exercise.

## 2025-03-09 NOTE — PATIENT INSTRUCTIONS
Health Maintenance Due   Topic Date Due    RSV Vaccine - Adults (1 - 1-dose 75+ series) Never done    INFLUENZA VACCINE  07/01/2024    COVID-19 Vaccine (4 - 2024-25 season) 09/01/2024    ZOSTER VACCINE (2 of 2) 09/19/2024    ANNUAL WELLNESS VISIT  03/08/2025    LIPID PANEL  03/08/2025    BMI FOLLOWUP  03/08/2025    Patient to ask gastroenterology if can take Zetia    Patient to ask GI abput nutricalm and also check [facility] website.

## 2025-03-10 ENCOUNTER — OFFICE (OUTPATIENT)
Dept: URBAN - METROPOLITAN AREA CLINIC 46 | Facility: CLINIC | Age: 75
End: 2025-03-10
Payer: MEDICARE

## 2025-03-10 VITALS — HEIGHT: 64 IN

## 2025-03-10 DIAGNOSIS — K76.0 FATTY (CHANGE OF) LIVER, NOT ELSEWHERE CLASSIFIED: ICD-10-CM

## 2025-03-10 DIAGNOSIS — R74.8 ABNORMAL LEVELS OF OTHER SERUM ENZYMES: ICD-10-CM

## 2025-03-10 PROCEDURE — 76981 USE PARENCHYMA: CPT | Performed by: INTERNAL MEDICINE

## 2025-03-11 ENCOUNTER — LAB (OUTPATIENT)
Dept: FAMILY MEDICINE CLINIC | Facility: CLINIC | Age: 75
End: 2025-03-11
Payer: MEDICARE

## 2025-03-11 ENCOUNTER — OFFICE VISIT (OUTPATIENT)
Dept: FAMILY MEDICINE CLINIC | Facility: CLINIC | Age: 75
End: 2025-03-11
Payer: MEDICARE

## 2025-03-11 VITALS
DIASTOLIC BLOOD PRESSURE: 86 MMHG | WEIGHT: 156 LBS | HEIGHT: 62 IN | BODY MASS INDEX: 28.71 KG/M2 | HEART RATE: 87 BPM | SYSTOLIC BLOOD PRESSURE: 120 MMHG | OXYGEN SATURATION: 96 % | TEMPERATURE: 98.6 F

## 2025-03-11 DIAGNOSIS — R10.9 FLANK PAIN: ICD-10-CM

## 2025-03-11 DIAGNOSIS — E78.2 MIXED HYPERLIPIDEMIA: ICD-10-CM

## 2025-03-11 DIAGNOSIS — I10 PRIMARY HYPERTENSION: ICD-10-CM

## 2025-03-11 DIAGNOSIS — Z80.0 FAMILY HISTORY OF LIVER CANCER: ICD-10-CM

## 2025-03-11 DIAGNOSIS — Z00.01 ENCOUNTER FOR ANNUAL GENERAL MEDICAL EXAMINATION WITH ABNORMAL FINDINGS IN ADULT: Primary | ICD-10-CM

## 2025-03-11 DIAGNOSIS — F51.04 CHRONIC INSOMNIA: ICD-10-CM

## 2025-03-11 DIAGNOSIS — F41.8 MIXED ANXIETY DEPRESSIVE DISORDER: ICD-10-CM

## 2025-03-11 DIAGNOSIS — G89.29 CHRONIC PAIN OF BOTH KNEES: ICD-10-CM

## 2025-03-11 DIAGNOSIS — E55.9 VITAMIN D DEFICIENCY: ICD-10-CM

## 2025-03-11 DIAGNOSIS — R73.03 PREDIABETES: ICD-10-CM

## 2025-03-11 DIAGNOSIS — R74.8 ELEVATED LIVER ENZYMES: ICD-10-CM

## 2025-03-11 DIAGNOSIS — M25.561 CHRONIC PAIN OF BOTH KNEES: ICD-10-CM

## 2025-03-11 DIAGNOSIS — Z00.01 ENCOUNTER FOR ANNUAL GENERAL MEDICAL EXAMINATION WITH ABNORMAL FINDINGS IN ADULT: ICD-10-CM

## 2025-03-11 DIAGNOSIS — M85.89 OSTEOPENIA OF MULTIPLE SITES: ICD-10-CM

## 2025-03-11 DIAGNOSIS — Z85.828 HISTORY OF SKIN CANCER: ICD-10-CM

## 2025-03-11 DIAGNOSIS — E66.3 OVERWEIGHT WITH BODY MASS INDEX (BMI) OF 28 TO 28.9 IN ADULT: ICD-10-CM

## 2025-03-11 DIAGNOSIS — M25.562 CHRONIC PAIN OF BOTH KNEES: ICD-10-CM

## 2025-03-11 PROBLEM — M54.41 CHRONIC BILATERAL LOW BACK PAIN WITH RIGHT-SIDED SCIATICA: Status: ACTIVE | Noted: 2025-03-11

## 2025-03-11 LAB
25(OH)D3 SERPL-MCNC: 45.6 NG/ML (ref 30–100)
ALBUMIN SERPL-MCNC: 4.6 G/DL (ref 3.5–5.2)
ALBUMIN/GLOB SERPL: 1.2 G/DL
ALP SERPL-CCNC: 50 U/L (ref 39–117)
ALT SERPL W P-5'-P-CCNC: 59 U/L (ref 1–33)
ANION GAP SERPL CALCULATED.3IONS-SCNC: 12.5 MMOL/L (ref 5–15)
AST SERPL-CCNC: 52 U/L (ref 1–32)
BACTERIA UR QL AUTO: NORMAL /HPF
BASOPHILS # BLD AUTO: 0.04 10*3/MM3 (ref 0–0.2)
BASOPHILS NFR BLD AUTO: 0.5 % (ref 0–1.5)
BILIRUB SERPL-MCNC: 0.7 MG/DL (ref 0–1.2)
BILIRUB UR QL STRIP: NEGATIVE
BUN SERPL-MCNC: 17 MG/DL (ref 8–23)
BUN/CREAT SERPL: 14.7 (ref 7–25)
CALCIUM SPEC-SCNC: 10.5 MG/DL (ref 8.6–10.5)
CHLORIDE SERPL-SCNC: 101 MMOL/L (ref 98–107)
CHOLEST SERPL-MCNC: 277 MG/DL (ref 0–200)
CLARITY UR: CLEAR
CO2 SERPL-SCNC: 25.5 MMOL/L (ref 22–29)
COLOR UR: YELLOW
CREAT SERPL-MCNC: 1.16 MG/DL (ref 0.57–1)
DEPRECATED RDW RBC AUTO: 45.6 FL (ref 37–54)
EGFRCR SERPLBLD CKD-EPI 2021: 49 ML/MIN/1.73
EOSINOPHIL # BLD AUTO: 0.15 10*3/MM3 (ref 0–0.4)
EOSINOPHIL NFR BLD AUTO: 2 % (ref 0.3–6.2)
ERYTHROCYTE [DISTWIDTH] IN BLOOD BY AUTOMATED COUNT: 12.8 % (ref 12.3–15.4)
GLOBULIN UR ELPH-MCNC: 3.7 GM/DL
GLUCOSE SERPL-MCNC: 108 MG/DL (ref 65–99)
GLUCOSE UR STRIP-MCNC: NEGATIVE MG/DL
HBA1C MFR BLD: 6.1 % (ref 4.8–5.6)
HCT VFR BLD AUTO: 42.6 % (ref 34–46.6)
HDLC SERPL-MCNC: 48 MG/DL (ref 40–60)
HGB BLD-MCNC: 14.3 G/DL (ref 12–15.9)
HGB UR QL STRIP.AUTO: NEGATIVE
HOLD SPECIMEN: NORMAL
HYALINE CASTS UR QL AUTO: NORMAL /LPF
IMM GRANULOCYTES # BLD AUTO: 0.02 10*3/MM3 (ref 0–0.05)
IMM GRANULOCYTES NFR BLD AUTO: 0.3 % (ref 0–0.5)
KETONES UR QL STRIP: NEGATIVE
LDLC SERPL CALC-MCNC: 193 MG/DL (ref 0–100)
LDLC/HDLC SERPL: 3.98 {RATIO}
LEUKOCYTE ESTERASE UR QL STRIP.AUTO: ABNORMAL
LYMPHOCYTES # BLD AUTO: 3.53 10*3/MM3 (ref 0.7–3.1)
LYMPHOCYTES NFR BLD AUTO: 47.4 % (ref 19.6–45.3)
MAGNESIUM SERPL-MCNC: 2.3 MG/DL (ref 1.6–2.4)
MCH RBC QN AUTO: 32.9 PG (ref 26.6–33)
MCHC RBC AUTO-ENTMCNC: 33.6 G/DL (ref 31.5–35.7)
MCV RBC AUTO: 98.2 FL (ref 79–97)
MONOCYTES # BLD AUTO: 0.71 10*3/MM3 (ref 0.1–0.9)
MONOCYTES NFR BLD AUTO: 9.5 % (ref 5–12)
NEUTROPHILS NFR BLD AUTO: 2.99 10*3/MM3 (ref 1.7–7)
NEUTROPHILS NFR BLD AUTO: 40.3 % (ref 42.7–76)
NITRITE UR QL STRIP: NEGATIVE
NRBC BLD AUTO-RTO: 0 /100 WBC (ref 0–0.2)
PH UR STRIP.AUTO: 6 [PH] (ref 5–8)
PLATELET # BLD AUTO: 321 10*3/MM3 (ref 140–450)
PMV BLD AUTO: 9.9 FL (ref 6–12)
POTASSIUM SERPL-SCNC: 4.3 MMOL/L (ref 3.5–5.2)
PROT SERPL-MCNC: 8.3 G/DL (ref 6–8.5)
PROT UR QL STRIP: NEGATIVE
RBC # BLD AUTO: 4.34 10*6/MM3 (ref 3.77–5.28)
RBC # UR STRIP: NORMAL /HPF
REF LAB TEST METHOD: NORMAL
SODIUM SERPL-SCNC: 139 MMOL/L (ref 136–145)
SP GR UR STRIP: 1.01 (ref 1–1.03)
SQUAMOUS #/AREA URNS HPF: NORMAL /HPF
TRIGL SERPL-MCNC: 189 MG/DL (ref 0–150)
TSH SERPL DL<=0.05 MIU/L-ACNC: 1.87 UIU/ML (ref 0.27–4.2)
UROBILINOGEN UR QL STRIP: ABNORMAL
VIT B12 BLD-MCNC: 794 PG/ML (ref 211–946)
VLDLC SERPL-MCNC: 36 MG/DL (ref 5–40)
WBC # UR STRIP: NORMAL /HPF
WBC NRBC COR # BLD AUTO: 7.44 10*3/MM3 (ref 3.4–10.8)

## 2025-03-11 PROCEDURE — 80053 COMPREHEN METABOLIC PANEL: CPT | Performed by: PREVENTIVE MEDICINE

## 2025-03-11 PROCEDURE — 85025 COMPLETE CBC W/AUTO DIFF WBC: CPT | Performed by: PREVENTIVE MEDICINE

## 2025-03-11 PROCEDURE — 84443 ASSAY THYROID STIM HORMONE: CPT | Performed by: PREVENTIVE MEDICINE

## 2025-03-11 PROCEDURE — 83735 ASSAY OF MAGNESIUM: CPT | Performed by: PREVENTIVE MEDICINE

## 2025-03-11 PROCEDURE — 81001 URINALYSIS AUTO W/SCOPE: CPT | Performed by: PREVENTIVE MEDICINE

## 2025-03-11 PROCEDURE — 82306 VITAMIN D 25 HYDROXY: CPT | Performed by: PREVENTIVE MEDICINE

## 2025-03-11 PROCEDURE — 36415 COLL VENOUS BLD VENIPUNCTURE: CPT

## 2025-03-11 PROCEDURE — 80061 LIPID PANEL: CPT | Performed by: PREVENTIVE MEDICINE

## 2025-03-11 PROCEDURE — 83036 HEMOGLOBIN GLYCOSYLATED A1C: CPT | Performed by: PREVENTIVE MEDICINE

## 2025-03-11 PROCEDURE — 82607 VITAMIN B-12: CPT | Performed by: PREVENTIVE MEDICINE

## 2025-03-11 RX ORDER — CETIRIZINE HYDROCHLORIDE 10 MG/1
10 TABLET ORAL DAILY
Qty: 90 TABLET | Refills: 3 | Status: SHIPPED | OUTPATIENT
Start: 2025-03-11

## 2025-03-11 RX ORDER — LOSARTAN POTASSIUM 100 MG/1
100 TABLET ORAL DAILY
Qty: 90 TABLET | Refills: 0 | Status: SHIPPED | OUTPATIENT
Start: 2025-03-11

## 2025-03-11 NOTE — TELEPHONE ENCOUNTER
Rx Refill Note  Requested Prescriptions     Pending Prescriptions Disp Refills   • cetirizine (zyrTEC) 10 MG tablet [Pharmacy Med Name: CETIRIZINE 10MG TABLETS] 90 tablet 3     Sig: TAKE 1 TABLET BY MOUTH EVERY DAY   • losartan (COZAAR) 100 MG tablet [Pharmacy Med Name: LOSARTAN 100MG TABLETS] 90 tablet 0     Sig: TAKE 1 TABLET BY MOUTH DAILY      Last office visit with prescribing clinician: 3/8/2024   Last telemedicine visit with prescribing clinician: Visit date not found   Next office visit with prescribing clinician: 3/11/2025       Would you like a call back once the refill request has been completed: [] Yes [] No    If the office needs to give you a call back, can they leave a voicemail: [] Yes [] No    Gayle Salas MA  03/11/25, 09:29 EDT

## 2025-03-13 ENCOUNTER — OFFICE VISIT (OUTPATIENT)
Age: 75
End: 2025-03-13
Payer: MEDICARE

## 2025-03-13 VITALS — BODY MASS INDEX: 28.71 KG/M2 | OXYGEN SATURATION: 98 % | RESPIRATION RATE: 20 BRPM | WEIGHT: 156 LBS | HEIGHT: 62 IN

## 2025-03-13 DIAGNOSIS — M17.11 PRIMARY OSTEOARTHRITIS OF RIGHT KNEE: Primary | ICD-10-CM

## 2025-03-14 NOTE — PROGRESS NOTES
FOLLOW UP VISIT        Patient Name: Elizabeth Momin  : 1950  Primary Care Physician: Lise Bryant MD        Chief Complaint:  right knee pain    HPI:   History of Present Illness  The patient presents for evaluation of right knee pain.    She reports persistent discomfort in her knees, with the right knee exhibiting a popping sensation during ambulation.  She is able to bear weight on her knee without difficulty and managed to walk a mile the previous evening. She has been avoiding Aleve and Tylenol due to concerns about potential liver damage. She is considering gel injections as a treatment option.     She has used DoTerra Deep Blue cream for inflammation and is considering using an elastic bandage for support. She received a steroid injection on 2025, which provided relief for a few weeks before the onset of her current symptoms.            Past Medical/Surgical, Social and Family History:  I have reviewed and/or updated pertinent history as noted in the medical record including:  Past Medical History:   Diagnosis Date    Allergic rhinitis     Cancer 2019    Had mastectomy in right breast. Followed with rediation    Chronic insomnia     Chronic sinusitis     Fibrocystic breast disease     History of skin cancer     Hx of radiation therapy 2019    right    Hyperlipidemia     Hypertension     Osteoarthritis     Osteopenia of multiple sites 2016     Past Surgical History:   Procedure Laterality Date    APPENDECTOMY  1968    BREAST BIOPSY Right 2019    Invasive Mammary Carcinoma    BREAST LUMPECTOMY       SECTION  1978    LYMPH NODE BIOPSY      During mastectomy    MASTECTOMY Right 10/02/2019    Fabrizio Bernabe    MASTECTOMY PARTIAL WITH AXILLARY LYMPH NODE EXCISION Right 2019    Dr. Storm Dinh    TOTAL ABDOMINAL HYSTERECTOMY WITH SALPINGO OOPHORECTOMY      Fibroids     Social History     Occupational History    Not on file   Tobacco Use    Smoking status: Never      Passive exposure: Never    Smokeless tobacco: Never    Tobacco comments:     Passive Smoke Exposure: No    Vaping Use    Vaping status: Never Used   Substance and Sexual Activity    Alcohol use: No    Drug use: No    Sexual activity: Not Currently     Partners: Male      Social History     Social History Narrative    Not on file     Family History   Problem Relation Age of Onset    Hypertension Mother     Stroke Mother     Diabetes Sister     Hypertension Sister     Mental illness Sister         Psychiatric Care - Paranoid Schizophrenia    Hypertension Brother     Cancer Other         Aunt Colon Cancer. Nephew Lung Cancer (heavy smoker).        Allergies: No Known Allergies    Medications:   Home Medications:  Current Outpatient Medications on File Prior to Visit   Medication Sig    acetaminophen (TYLENOL) 650 MG 8 hr tablet Take 1 tablet by mouth.    amitriptyline (ELAVIL) 50 MG tablet TAKE 1 TABLET BY MOUTH EVERY NIGHT    cetirizine (zyrTEC) 10 MG tablet TAKE 1 TABLET BY MOUTH EVERY DAY    glucosamine-chondroitin 500-400 MG capsule capsule Take  by mouth 3 (Three) Times a Day With Meals.    losartan (COZAAR) 100 MG tablet TAKE 1 TABLET BY MOUTH DAILY    Multiple Vitamin (MULTI-VITAMIN) tablet Take 1 tablet by mouth Daily.     No current facility-administered medications on file prior to visit.         ROS:  14 point review of systems was negative except as listed in the HPI     Physical Exam:   75 y.o. female  Body mass index is 28.53 kg/m²., 70.8 kg (156 lb)  Vitals:    03/13/25 1044   Resp: 20   SpO2: 98%         General: Alert, cooperative, appears well and in no observable distress.   HEENT: Normocephalic, atraumatic on external visual inspection. No icterus.   CV: No significant peripheral edema.   Respiratory: Normal respiratory effort.   Skin: Warm & well perfused; appropriate skin turgor.  Psych: Appropriate mood & affect.  Neuro: Gross sensation and motor intact in affected extremity/extremities.            Investigations:  XR KNEE 3 VW BILATERAL     Date of Exam: 9/5/2024 12:13 PM EDT     Indication: bilaateral knee pain     Comparison: None available.     FINDINGS:  Right:  There is tricompartmental osteophytosis. There is moderate medial tibiofemoral joint space narrowing. No significant knee effusion. No displaced fracture is identified. Mineralization and alignment appear within normal limits.     Left:  There is tricompartmental osteophytosis. There is mild medial tibiofemoral and patellofemoral joint space narrowing. No significant knee effusion. No displaced fracture is identified. Mineralization and alignment appear within normal limits.     IMPRESSION:  Bilateral tricompartmental osteoarthritis with moderate medial tibiofemoral joint space narrowing on the right.           Electronically Signed: Storm Namita    9/8/2024 1:37 PM EDT    Workstation ID: [id number]             Assessment:  Assessment & Plan  1. Right knee osteoarthritis   The patient reports persistent knee pain despite a steroid injection administered in January. The right knee exhibits a popping sensation during ambulation along with pain that inhibits her activity. She is avoiding NSAIDs and Tylenol due to abnormal lab values with her recent PCP check up. Steroid injection provided only a few weeks of relief. Bracing and compression provided minimal relief. Topical pain creams also provide little relief.      Gel injections were discussed as a potential treatment option to provide lubrication and cushioning, which may help alleviate symptoms. The patient was informed about the minimal risks associated with gel injections, including mild discomfort, bruising, and low risk of bleeding. The potential benefits, such as avoiding knee replacement surgery, were also discussed. She was advised to use topical pain creams like Voltaren, which are safe for her liver. A knee brace was recommended to provide additional support and stability. An order  for the gel injection will be placed today, and the patient will be notified once it arrives.        Body mass index is 28.53 kg/m².  BMI consistent with Overweight: 25.0-29.9kg/m2        Patient encouraged to call with questions or concerns in the interim      LEO Hewitt     Patient or patient representative verbalized consent for the use of Ambient Listening during the visit with  LEO Hewitt for chart documentation. 3/14/2025  13:54 EDT

## 2025-04-04 ENCOUNTER — LAB (OUTPATIENT)
Dept: FAMILY MEDICINE CLINIC | Facility: CLINIC | Age: 75
End: 2025-04-04
Payer: MEDICARE

## 2025-04-04 DIAGNOSIS — R10.9 FLANK PAIN: Primary | ICD-10-CM

## 2025-04-04 LAB
BACTERIA UR QL AUTO: ABNORMAL /HPF
BILIRUB UR QL STRIP: NEGATIVE
CLARITY UR: CLEAR
COLOR UR: YELLOW
GLUCOSE UR STRIP-MCNC: NEGATIVE MG/DL
HGB UR QL STRIP.AUTO: NEGATIVE
HOLD SPECIMEN: NORMAL
HYALINE CASTS UR QL AUTO: ABNORMAL /LPF
KETONES UR QL STRIP: NEGATIVE
LEUKOCYTE ESTERASE UR QL STRIP.AUTO: ABNORMAL
NITRITE UR QL STRIP: NEGATIVE
PH UR STRIP.AUTO: 6 [PH] (ref 5–8)
PROT UR QL STRIP: NEGATIVE
RBC # UR STRIP: ABNORMAL /HPF
REF LAB TEST METHOD: ABNORMAL
SP GR UR STRIP: 1.01 (ref 1–1.03)
SQUAMOUS #/AREA URNS HPF: ABNORMAL /HPF
UROBILINOGEN UR QL STRIP: ABNORMAL
WBC # UR STRIP: ABNORMAL /HPF

## 2025-04-04 PROCEDURE — 87086 URINE CULTURE/COLONY COUNT: CPT | Performed by: PREVENTIVE MEDICINE

## 2025-04-04 PROCEDURE — 81001 URINALYSIS AUTO W/SCOPE: CPT | Performed by: PREVENTIVE MEDICINE

## 2025-04-06 LAB — BACTERIA SPEC AEROBE CULT: NORMAL

## 2025-04-07 ENCOUNTER — RESULTS FOLLOW-UP (OUTPATIENT)
Dept: FAMILY MEDICINE CLINIC | Facility: CLINIC | Age: 75
End: 2025-04-07
Payer: MEDICARE

## 2025-04-07 NOTE — PROGRESS NOTES
Concern initially that the urine might be infected but culture did not grow any signs of infection.  If you are still having symptoms you should do a careful clean-catch we collect at the office call if any other questions or concerns

## 2025-04-08 ENCOUNTER — LAB (OUTPATIENT)
Dept: FAMILY MEDICINE CLINIC | Facility: CLINIC | Age: 75
End: 2025-04-08
Payer: MEDICARE

## 2025-04-08 DIAGNOSIS — R79.89 ELEVATED SERUM CREATININE: ICD-10-CM

## 2025-04-08 LAB
CREAT SERPL-MCNC: 1.07 MG/DL (ref 0.57–1)
EGFRCR SERPLBLD CKD-EPI 2021: 53.9 ML/MIN/1.73

## 2025-04-08 PROCEDURE — 82565 ASSAY OF CREATININE: CPT | Performed by: PREVENTIVE MEDICINE

## 2025-04-08 PROCEDURE — 36415 COLL VENOUS BLD VENIPUNCTURE: CPT

## 2025-04-10 ENCOUNTER — OFFICE VISIT (OUTPATIENT)
Age: 75
End: 2025-04-10
Payer: MEDICARE

## 2025-04-10 VITALS — BODY MASS INDEX: 28.71 KG/M2 | RESPIRATION RATE: 20 BRPM | HEIGHT: 62 IN | OXYGEN SATURATION: 99 % | WEIGHT: 156 LBS

## 2025-04-10 DIAGNOSIS — M17.11 PRIMARY OSTEOARTHRITIS OF RIGHT KNEE: ICD-10-CM

## 2025-04-10 RX ORDER — LIDOCAINE HYDROCHLORIDE 10 MG/ML
2 INJECTION, SOLUTION EPIDURAL; INFILTRATION; INTRACAUDAL; PERINEURAL
Status: COMPLETED | OUTPATIENT
Start: 2025-04-10 | End: 2025-04-10

## 2025-04-10 RX ADMIN — LIDOCAINE HYDROCHLORIDE 2 ML: 10 INJECTION, SOLUTION EPIDURAL; INFILTRATION; INTRACAUDAL; PERINEURAL at 11:02

## 2025-04-10 NOTE — PROGRESS NOTES
INJECTION VISIT    Patient: Elizabeth Momin    YOB: 1950    MRN: [id number]    Chief Complaint   Patient presents with    Right Knee - Follow-up       History of Present Illness:   Elizabeth Momin is a 75 y.o. year old who presents today for injection of the right knee with Monovisc. This will be her first VISCO injection.         Allergies: No Known Allergies    Medications:   Home Medications:  Current Outpatient Medications on File Prior to Visit   Medication Sig    acetaminophen (TYLENOL) 650 MG 8 hr tablet Take 1 tablet by mouth.    amitriptyline (ELAVIL) 50 MG tablet TAKE 1 TABLET BY MOUTH EVERY NIGHT    cetirizine (zyrTEC) 10 MG tablet TAKE 1 TABLET BY MOUTH EVERY DAY    glucosamine-chondroitin 500-400 MG capsule capsule Take  by mouth 3 (Three) Times a Day With Meals.    losartan (COZAAR) 100 MG tablet TAKE 1 TABLET BY MOUTH DAILY    Multiple Vitamin (MULTI-VITAMIN) tablet Take 1 tablet by mouth Daily.     No current facility-administered medications on file prior to visit.         I have reviewed the patient's medical history in detail and updated the computerized patient record.  Review and summarization of old records include:    Past Medical History:   Diagnosis Date    Allergic rhinitis     Cancer 2019    Had mastectomy in right breast. Followed with rediation    Chronic insomnia     Chronic sinusitis     Fibrocystic breast disease     History of skin cancer     Hx of radiation therapy 2019    right    Hyperlipidemia     Hypertension     Osteoarthritis     Osteopenia of multiple sites 2016     Past Surgical History:   Procedure Laterality Date    APPENDECTOMY  1968    BREAST BIOPSY Right 2019    Invasive Mammary Carcinoma    BREAST LUMPECTOMY       SECTION  1978    LYMPH NODE BIOPSY      During mastectomy    MASTECTOMY Right 10/02/2019    Fabrizio Bernabe    MASTECTOMY PARTIAL WITH AXILLARY LYMPH NODE EXCISION Right 2019    Dr. Storm Dinh    TOTAL ABDOMINAL  HYSTERECTOMY WITH SALPINGO OOPHORECTOMY  1995    Fibroids     Social History     Occupational History    Not on file   Tobacco Use    Smoking status: Never     Passive exposure: Never    Smokeless tobacco: Never    Tobacco comments:     Passive Smoke Exposure: No    Vaping Use    Vaping status: Never Used   Substance and Sexual Activity    Alcohol use: No    Drug use: No    Sexual activity: Not Currently     Partners: Male      Social History     Social History Narrative    Not on file     Family History   Problem Relation Age of Onset    Hypertension Mother     Stroke Mother     Diabetes Sister     Hypertension Sister     Mental illness Sister         Psychiatric Care - Paranoid Schizophrenia    Hypertension Brother     Cancer Other         Aunt Colon Cancer. Nephew Lung Cancer (heavy smoker).                ROS  Negative unless listed in the HPI        Physical Exam  75 y.o. female  Body mass index is 28.53 kg/m²., 70.8 kg (156 lb)  Vitals:    04/10/25 1027   Resp: 20   SpO2: 99%     General: Alert, cooperative, appears well and in no observable distress.   HEENT: Normocephalic, atraumatic on external visual inspection. No icterus.   CV: No significant peripheral edema.   Respiratory: Normal respiratory effort.   Skin: Warm & well perfused; appropriate skin turgor.  Psych: Appropriate mood & affect.  Neuro: Gross sensation and motor intact in affected extremity/extremities.  Vascular: Peripheral pulses palpable in affected extremity/extremities.         Procedure:  Large Joint Arthrocentesis: R knee  Date/Time: 4/10/2025 11:02 AM  Consent given by: patient  Site marked: site marked  Timeout: Immediately prior to procedure a time out was called to verify the correct patient, procedure, equipment, support staff and site/side marked as required   Supporting Documentation  Indications: pain and diagnostic evaluation   Procedure Details  Location: knee - R knee  Preparation: Patient was prepped and draped in the usual  sterile fashion  Needle size: 18 G  Approach: anterolateral  Medications administered: 2 mL lidocaine PF 1% 1 %; 88 mg Hyaluronan 88 MG/4ML  Patient tolerance: patient tolerated the procedure well with no immediate complications            Assessment:   Diagnoses and all orders for this visit:    1. Primary osteoarthritis of right knee  -     Visco Treatment      Body mass index is 28.53 kg/m².  BMI consistent with Overweight: 25.0-29.9kg/m2         Plan:   -     Risks and benefits of injection therapy discussed with patient.   Injected patient's right knee joint(s)with Monovisc from an anterolateral approach   Compression/brace   Rest, ice, compression, and elevation (RICE) therapy  OTC Tylenol for pain PRN  Follow up in 3 months or PRN  Please call with questions or concerns in the interim        Date of encounter: 04/10/2025   LEO Hewitt

## 2025-04-14 ENCOUNTER — OFFICE VISIT (OUTPATIENT)
Dept: FAMILY MEDICINE CLINIC | Facility: CLINIC | Age: 75
End: 2025-04-14
Payer: MEDICARE

## 2025-04-14 VITALS
HEART RATE: 109 BPM | TEMPERATURE: 98.4 F | BODY MASS INDEX: 28.85 KG/M2 | HEIGHT: 62 IN | SYSTOLIC BLOOD PRESSURE: 119 MMHG | DIASTOLIC BLOOD PRESSURE: 76 MMHG | OXYGEN SATURATION: 96 % | WEIGHT: 156.8 LBS

## 2025-04-14 DIAGNOSIS — M54.9 UPPER BACK PAIN ON RIGHT SIDE: Primary | ICD-10-CM

## 2025-04-14 DIAGNOSIS — R00.0 TACHYCARDIA: ICD-10-CM

## 2025-04-14 DIAGNOSIS — I49.9 IRREGULAR HEART RHYTHM: ICD-10-CM

## 2025-04-14 RX ORDER — METAPROTERENOL SULFATE 10 MG
1 TABLET ORAL DAILY
COMMUNITY

## 2025-04-14 NOTE — PROGRESS NOTES
Procedure     ECG 12 Lead    Date/Time: 4/14/2025 3:59 PM  Performed by: Delmis Troncoso APRN    Authorized by: Delmis Troncoso APRN  Comparison: compared with previous ECG from 5/22/2024  Rhythm: sinus tachycardia  Ectopy: multifocal PVCs  Rate: tachycardic  QRS axis: normal    Clinical impression: abnormal EKG

## 2025-04-14 NOTE — PROGRESS NOTES
"Subjective   Elizabeth Momin is a 75 y.o. female presents for   Chief Complaint   Patient presents with    Back Pain     Cannot think of anything she did to injure it. Upper back across shoulder blade then down the middle.       Health Maintenance Due   Topic Date Due    RSV Vaccine - Adults (1 - 1-dose 75+ series) Never done    COVID-19 Vaccine (4 - 2024-25 season) 09/01/2024    ZOSTER VACCINE (2 of 2) 09/19/2024       History of Present Illness  The patient is a 75-year-old female who presents today for upper to middle back pain.    She has been experiencing persistent, non-radiating pain in the right upper to middle back region for several weeks. The pain is described as a chronic ache, not associated with any specific injury or exacerbated by movement or breathing. She reports no pain during bending or extension movements. She has attempted to alleviate the discomfort with heat application, which provides some relief. She is unable to take ibuprofen. Resting slightly alleviates the pain, but it remains present. She also reports occasional pain under her arm and a painful spot on her rib. She has a history of mastectomy on the same side as the current pain and underwent a significant procedure to address fluid accumulation in the area.     She has a known diagnosis of liver disease and a large gallstone, which she suspects may be contributing to her current symptoms.    She is under the care of a cardiologist, Dr. Sykes, but missed her last appointment due to a COVID-19 infection and has not had her annual follow-up. She underwent a stress test in 06/2024.       Vitals:    04/14/25 1531   BP: 119/76   BP Location: Right arm   Patient Position: Sitting   Cuff Size: Large Adult   Pulse: 109   Temp: 98.4 °F (36.9 °C)   TempSrc: Tympanic   SpO2: 96%   Weight: 71.1 kg (156 lb 12.8 oz)   Height: 157.5 cm (62.01\")     Body mass index is 28.67 kg/m².    Current Outpatient Medications on File Prior to Visit "   Medication Sig Dispense Refill    acetaminophen (TYLENOL) 650 MG 8 hr tablet Take 1 tablet by mouth.      amitriptyline (ELAVIL) 50 MG tablet TAKE 1 TABLET BY MOUTH EVERY NIGHT 90 tablet 0    cetirizine (zyrTEC) 10 MG tablet TAKE 1 TABLET BY MOUTH EVERY DAY 90 tablet 3    glucosamine-chondroitin 500-400 MG capsule capsule Take  by mouth 3 (Three) Times a Day With Meals. (Patient taking differently: Take  by mouth Daily.)      losartan (COZAAR) 100 MG tablet TAKE 1 TABLET BY MOUTH DAILY 90 tablet 0    Multiple Vitamin (MULTI-VITAMIN) tablet Take 1 tablet by mouth Daily.      Omega-3 Fatty Acids (Omega-3 Fish Oil) 500 MG capsule Take 1 capsule by mouth Daily.       No current facility-administered medications on file prior to visit.       The following portions of the patient's history were reviewed and updated as appropriate: allergies, current medications, past family history, past medical history, past social history, past surgical history, and problem list.    Review of Systems   Musculoskeletal:  Positive for back pain.       Objective   Physical Exam  Vitals and nursing note reviewed.   Constitutional:       Appearance: Normal appearance. She is well-developed.   HENT:      Head: Normocephalic and atraumatic.      Right Ear: External ear normal.      Left Ear: External ear normal.      Nose: Nose normal.   Eyes:      Extraocular Movements: Extraocular movements intact.      Pupils: Pupils are equal, round, and reactive to light.   Cardiovascular:      Rate and Rhythm: Normal rate. Rhythm irregular.      Heart sounds: Normal heart sounds.   Pulmonary:      Effort: Pulmonary effort is normal.      Breath sounds: Normal breath sounds.   Abdominal:      General: Bowel sounds are normal.      Palpations: Abdomen is soft.   Musculoskeletal:         General: Normal range of motion.      Cervical back: Normal range of motion and neck supple.      Thoracic back: No deformity or bony tenderness. Normal range of motion.    Skin:     General: Skin is warm and dry.   Neurological:      General: No focal deficit present.      Mental Status: She is alert and oriented to person, place, and time.      Gait: Gait normal.      Deep Tendon Reflexes:      Reflex Scores:       Patellar reflexes are 2+ on the right side and 2+ on the left side.  Psychiatric:         Mood and Affect: Mood normal.         Behavior: Behavior normal.         Judgment: Judgment normal.          Lungs were auscultated.  Heart was examined.  Reflexes were checked.  PHQ-9 Total Score:      Results  Testing  EKG showed higher heart rhythm and PVCs.  BP is within normal limits.        Assessment & Plan   Diagnoses and all orders for this visit:    1. Upper back pain on right side (Primary)  -     XR Spine Thoracic 2 View (In Office)    2. Irregular heart rhythm  -     ECG 12 Lead    3. Tachycardia             Assessment & Plan  1. Upper to middle back pain.  She reports persistent upper to middle back pain on the right side for a couple of weeks, described as a nagging ache that does not worsen with movement or breathing. There is no history of injury, and the pain is not exacerbated by physical activity. Rest and heat application provide some relief. An x-ray of the affected area will be ordered to rule out musculoskeletal or spinal causes. She is advised to continue using a heating pad at home, especially when sitting in her chair at night, to alleviate the discomfort. Depending on what xray shows, potentially may order PT or need referral.     2. Liver disease.  She has a history of liver disease and is currently managing it. No new symptoms related to liver disease were reported during this visit.    3. Gallstone.  She has a known gallstone but reports no recent episodes of pain or discomfort related to it.     4. Irregular heart rate.  An EKG was performed today due to the detection of an irregular heart rate during the physical examination. She has a history of  seeing a cardiologist and had a stress test in June of last year. If the EKG shows any abnormalities, a follow-up appointment with her cardiologist will be scheduled.    PROCEDURE  The patient has a history of mastectomy on the right side and underwent a significant procedure to address fluid accumulation in the area.    There are no Patient Instructions on file for this visit.         Patient or patient representative verbalized consent for the use of Ambient Listening during the visit with  LEO Quintero for chart documentation. 4/14/2025  15:56 EDT

## 2025-04-15 ENCOUNTER — HOSPITAL ENCOUNTER (OUTPATIENT)
Dept: GENERAL RADIOLOGY | Facility: HOSPITAL | Age: 75
Discharge: HOME | End: 2025-04-15
Payer: MEDICARE

## 2025-04-15 DIAGNOSIS — M54.9 UPPER BACK PAIN ON RIGHT SIDE: ICD-10-CM

## 2025-04-15 PROCEDURE — 72072 X-RAY EXAM THORAC SPINE 3VWS: CPT

## 2025-06-11 RX ORDER — LOSARTAN POTASSIUM 100 MG/1
100 TABLET ORAL DAILY
Qty: 90 TABLET | Refills: 0 | Status: SHIPPED | OUTPATIENT
Start: 2025-06-11

## 2025-06-20 ENCOUNTER — OFFICE (OUTPATIENT)
Dept: URBAN - METROPOLITAN AREA CLINIC 64 | Facility: CLINIC | Age: 75
End: 2025-06-20
Payer: MEDICARE

## 2025-06-20 VITALS
HEART RATE: 98 BPM | DIASTOLIC BLOOD PRESSURE: 87 MMHG | WEIGHT: 149 LBS | SYSTOLIC BLOOD PRESSURE: 119 MMHG | HEIGHT: 64 IN

## 2025-06-20 DIAGNOSIS — K76.0 FATTY (CHANGE OF) LIVER, NOT ELSEWHERE CLASSIFIED: ICD-10-CM

## 2025-06-20 PROCEDURE — 99214 OFFICE O/P EST MOD 30 MIN: CPT | Performed by: INTERNAL MEDICINE

## 2025-06-26 NOTE — PATIENT INSTRUCTIONS
Health Maintenance Due   Topic Date Due    RSV Vaccine - Adults (1 - 1-dose 75+ series) Never done    COVID-19 Vaccine (4 - 2024-25 season) 09/01/2024    Patient to call Dr. Boo for recheck on multifocal PVC's and rapid heart rate.

## 2025-06-27 ENCOUNTER — OFFICE VISIT (OUTPATIENT)
Dept: FAMILY MEDICINE CLINIC | Facility: CLINIC | Age: 75
End: 2025-06-27
Payer: MEDICARE

## 2025-06-27 VITALS
OXYGEN SATURATION: 100 % | HEART RATE: 106 BPM | BODY MASS INDEX: 28.34 KG/M2 | SYSTOLIC BLOOD PRESSURE: 123 MMHG | DIASTOLIC BLOOD PRESSURE: 85 MMHG | TEMPERATURE: 98 F | HEIGHT: 62 IN | WEIGHT: 154 LBS

## 2025-06-27 DIAGNOSIS — F51.04 CHRONIC INSOMNIA: ICD-10-CM

## 2025-06-27 DIAGNOSIS — G89.29 CHRONIC PAIN OF BOTH KNEES: ICD-10-CM

## 2025-06-27 DIAGNOSIS — L65.9 HAIR LOSS: ICD-10-CM

## 2025-06-27 DIAGNOSIS — F41.8 MIXED ANXIETY DEPRESSIVE DISORDER: ICD-10-CM

## 2025-06-27 DIAGNOSIS — E66.3 OVERWEIGHT WITH BODY MASS INDEX (BMI) OF 28 TO 28.9 IN ADULT: ICD-10-CM

## 2025-06-27 DIAGNOSIS — M25.561 CHRONIC PAIN OF BOTH KNEES: ICD-10-CM

## 2025-06-27 DIAGNOSIS — R00.0 TACHYCARDIA: ICD-10-CM

## 2025-06-27 DIAGNOSIS — M25.562 CHRONIC PAIN OF BOTH KNEES: ICD-10-CM

## 2025-06-27 DIAGNOSIS — T14.8XXA BRUISING: ICD-10-CM

## 2025-06-27 DIAGNOSIS — I10 PRIMARY HYPERTENSION: Primary | ICD-10-CM

## 2025-06-27 DIAGNOSIS — R74.8 ELEVATED LIVER ENZYMES: ICD-10-CM

## 2025-06-27 DIAGNOSIS — R73.9 HYPERGLYCEMIA: ICD-10-CM

## 2025-06-27 PROBLEM — L57.0 ACTINIC KERATOSIS: Status: ACTIVE | Noted: 2022-04-05

## 2025-06-27 PROBLEM — D18.01 HEMANGIOMA OF SKIN AND SUBCUTANEOUS TISSUE: Status: ACTIVE | Noted: 2022-04-05

## 2025-06-27 PROBLEM — L82.1 SEBORRHEIC KERATOSIS: Status: ACTIVE | Noted: 2022-04-05

## 2025-06-27 PROBLEM — L57.9 SKIN CHANGES DUE TO CHRONIC EXPOSURE TO NONIONIZING RADIATION: Status: ACTIVE | Noted: 2022-04-05

## 2025-06-27 PROBLEM — D69.2 NONTHROMBOCYTOPENIC PURPURA: Status: ACTIVE | Noted: 2025-06-24

## 2025-06-27 RX ORDER — ATORVASTATIN CALCIUM 10 MG/1
10 TABLET, FILM COATED ORAL DAILY
Qty: 90 TABLET | Refills: 3 | Status: SHIPPED | OUTPATIENT
Start: 2025-06-27

## 2025-06-27 NOTE — PROGRESS NOTES
Subjective   lEizabeth Momin is a 75 y.o. female presents for   Chief Complaint   Patient presents with    Hypertension    Bleeding/Bruising     Pt has bumps and bruises all over.        Health Maintenance Due   Topic Date Due    RSV Vaccine - Adults (1 - 1-dose 75+ series) Never done    COVID-19 Vaccine (4 - 2024-25 season) 09/01/2024       Hypertension  Associated symptoms: palpitations    Associated symptoms: no chest pain       History of Present Illness  The patient is a 75-year-old female who is here today to follow up on bruising, hypertension, mixed anxiety and depression, and overweight with a body mass index of 28.    She has been under the care of a hepatologist for the past 1.5 years, during which she underwent a FibroScan that revealed a score of 0.2 and 2.4. Despite these results, she was not satisfied with her overall progress. She was advised to maintain her weight and was informed that her liver condition was not related to her elevated cholesterol levels. She has successfully lost 8 pounds and has been making efforts to reduce her intake of sweets, breads, and starches. She was prescribed Vascepa but declined to take it. She has been managing well on atorvastatin 10 mg.    She reports experiencing skin bleeding upon minor contact or scratching, a condition that has been ongoing for some time but has become more noticeable in the past 6 months. She does not take any blood thinners and reports no presence of blood in her urine, stools, or vomit. She also reports no vaginal bleeding.    She continues to experience significant hair loss and is seeking potential treatments. She has considered using biotin shampoo but is hesitant to take biotin tablets due to concerns about liver health. She currently takes a multivitamin that contains biotin.    She has observed an increase in her heart rate, with readings of 105 today and 108 during her last visit. She occasionally experiences heart palpitations and  "racing heart, even at rest. She has a history of irregular heartbeat, as diagnosed by Dr. Benites following an EKG when her heart rate was 108. She has a family history of heart disease, with three relatives having suffered heart attacks. She had a light case of COVID-19 a few months ago.    She has been managing her blood pressure effectively with losartan.    She has been taking amitriptyline for sleep disturbances but has recently switched to a different medication, which she finds more effective.    She has been taking Tylenol and has not been taking ibuprofen, Aleve, or Motrin.    She had radiation therapy and tick disease.    FAMILY HISTORY  Her mother and father lived to be 90 and 91, respectively. She has lost three family members to heart attacks, with her sister passing away at age 59.    Vitals:    06/27/25 1335   BP: 123/85   BP Location: Left arm   Patient Position: Sitting   Cuff Size: Adult   Pulse: 106   Temp: 98 °F (36.7 °C)   TempSrc: Infrared   SpO2: 100%   Weight: 69.9 kg (154 lb)   Height: 157.5 cm (62.01\")     Body mass index is 28.16 kg/m².    Current Outpatient Medications on File Prior to Visit   Medication Sig Dispense Refill    acetaminophen (TYLENOL) 650 MG 8 hr tablet Take 1 tablet by mouth.      amitriptyline (ELAVIL) 50 MG tablet TAKE 1 TABLET BY MOUTH EVERY NIGHT 90 tablet 0    cetirizine (zyrTEC) 10 MG tablet TAKE 1 TABLET BY MOUTH EVERY DAY 90 tablet 3    glucosamine-chondroitin 500-400 MG capsule capsule Take  by mouth 3 (Three) Times a Day With Meals. (Patient taking differently: Take  by mouth Daily.)      losartan (COZAAR) 100 MG tablet TAKE 1 TABLET BY MOUTH DAILY 90 tablet 0    Multiple Vitamin (MULTI-VITAMIN) tablet Take 1 tablet by mouth Daily.      Omega-3 Fatty Acids (Omega-3 Fish Oil) 500 MG capsule Take 1 capsule by mouth Daily.       No current facility-administered medications on file prior to visit.       The following portions of the patient's history were reviewed and " updated as appropriate: allergies, current medications, past family history, past medical history, past social history, past surgical history, and problem list.    Review of Systems   Cardiovascular:  Positive for palpitations. Negative for chest pain and leg swelling.   Musculoskeletal:  Positive for arthralgias, gait problem and joint swelling.   Hematological:  Negative for adenopathy. Bruises/bleeds easily.   Psychiatric/Behavioral:  Negative for dysphoric mood and sleep disturbance. The patient is not nervous/anxious.        Objective   Physical Exam  Vitals reviewed.   Constitutional:       General: She is not in acute distress.     Appearance: Normal appearance. She is well-developed. She is not ill-appearing or toxic-appearing.   HENT:      Head: Normocephalic and atraumatic.      Nose: Nose normal.   Eyes:      Extraocular Movements: Extraocular movements intact.      Conjunctiva/sclera: Conjunctivae normal.      Pupils: Pupils are equal, round, and reactive to light.   Neck:      Vascular: No carotid bruit.   Cardiovascular:      Rate and Rhythm: Regular rhythm. Tachycardia present.      Heart sounds: Normal heart sounds.   Pulmonary:      Effort: Pulmonary effort is normal.      Breath sounds: Normal breath sounds.   Abdominal:      General: There is no distension.      Palpations: There is no mass.      Tenderness: There is no abdominal tenderness. There is no right CVA tenderness or left CVA tenderness.   Musculoskeletal:      Cervical back: No tenderness.      Right lower leg: No edema.      Left lower leg: No edema.   Lymphadenopathy:      Cervical: No cervical adenopathy.   Skin:     Findings: Bruising present.   Neurological:      Mental Status: She is alert and oriented to person, place, and time.   Psychiatric:         Mood and Affect: Mood normal.         Behavior: Behavior normal.       Physical Exam  Respiratory: Clear to auscultation, no wheezing, rales or rhonchi  Cardiovascular: Regular rate  and rhythm, no murmurs, rubs, or gallops  Gastrointestinal: Soft, no tenderness, no distention, no masses  Skin: Multiple bruises and bleeding areas noted on the legs    PHQ-9 Total Score:    Results  Labs   - Platelets: 06/16/2025, 254   - Red Count: 06/16/2025, Normal   - Cholesterol: 06/16/2025, High   - Triglycerides: 06/16/2025, 200   - HDL: 06/16/2025, 46   - Calcium: 06/16/2025, Slightly high   - Glucose: 06/16/2025, High   - GFR: 06/16/2025, 41    Imaging   - Echocardiogram: Normal left ventricular function with no specific valve pathology    Diagnostic Testing   - FibroScan: 06/16/2025, Score over 0.2 and 2.4         Assessment & Plan   Diagnoses and all orders for this visit:    1. Primary hypertension (Primary)    2. Bruising    3. Mixed anxiety depressive disorder    4. Overweight with body mass index (BMI) of 28 to 28.9 in adult    5. Tachycardia  -     Holter Monitor - 72 Hour Up To 15 Days; Future  -     TSH Rfx On Abnormal To Free T4; Future  -     Magnesium; Future    6. Chronic pain of both knees    7. Chronic insomnia    8. Hair loss    9. Elevated liver enzymes    10. Hyperglycemia  -     Hemoglobin A1c; Future    Other orders  -     empagliflozin (Jardiance) 10 MG tablet tablet; Take 1 tablet by mouth Daily.  Dispense: 30 tablet; Refill: 1  -     atorvastatin (Lipitor) 10 MG tablet; Take 1 tablet by mouth Daily.  Dispense: 90 tablet; Refill: 3      Assessment & Plan  1. Hyperlipidemia.  - Cholesterol levels are elevated, with triglycerides at 200 mg/dL and HDL at 46 mg/dL.  - Goal is to maintain total cholesterol <200 mg/dL and LDL <100 mg/dL.  - Advised to restart atorvastatin 10 mg daily.  - Three refills provided.    2. Kidney dysfunction.  - Kidney function is suboptimal, with a GFR of 41 mL/min/1.73 m².  - Target GFR is closer to 60 mL/min/1.73 m².  - Prescription for Jardiance, the smallest size with one refill, provided.  - Dosage will be increased if insurance covers it to enhance kidney  function.    3. Bruising.  - Reports easy bruising and bleeding upon minor trauma.  - Platelet count is within the normal range at 254 x 10^9/L.  - Bruising could be attributed to medications (losartan, cetirizine, and amitriptyline) or age-related thinning of the skin's fatty layer.  - If bruising worsens, additional tests may be ordered, and a referral to a hematology oncology specialist will be considered.    4. Hair loss.  - Reports significant hair loss, potentially due to previous trauma from a tick bite and recent COVID-19 infection.  - Hair pull test conducted today yielded one hair each in two out of three pulls.  - No additional biotin supplementation recommended due to potential liver concerns.    5. Tachycardia.  - History of hypertension, hyperlipidemia, and a strong family history of coronary artery disease.  - Presents with abnormal liver enzymes and persistent fatigue following a tick bite last year.  - Echocardiogram revealed normal left ventricular function with no specific valve pathology.  - Exhibits sinus tachycardia and multifocal premature ventricular contractions.  - Holter monitor will be applied for a week to assess heart rate.  - Cardiology appointment will be scheduled for further evaluation.    6. Hypertension.  - Currently on losartan and reports doing well with it.    7. Sleep disturbance.  - Advised to continue using the alternative medication instead of amitriptyline until cardiac issues are resolved.    8. Medication management.  - Currently taking Tylenol and does not use ibuprofen, Aleve, or Motrin.    Patient Instructions     Health Maintenance Due   Topic Date Due    RSV Vaccine - Adults (1 - 1-dose 75+ series) Never done    COVID-19 Vaccine (4 - 2024-25 season) 09/01/2024    Patient to call Dr. Boo for recheck on multifocal PVC's and rapid heart rate.       Patient or patient representative verbalized consent for the use of Ambient Listening during the visit with  Lise Love  MD Manny for chart documentation. 6/27/2025  15:08 EDT

## 2025-07-02 ENCOUNTER — TELEPHONE (OUTPATIENT)
Dept: FAMILY MEDICINE CLINIC | Facility: CLINIC | Age: 75
End: 2025-07-02
Payer: MEDICARE

## 2025-07-02 NOTE — TELEPHONE ENCOUNTER
Caller: Elizabeth Momin    Relationship to patient: Self    Best call back number: [phone]    Patient is needing: PATIENT IS CALLING TO LET THE OFFICE KNOW SHE WANTS TO WAIT TO START HER JARDIANCE UNTIL AFTER HER PHYSICAL IN MARCH. SHE WANTS TO GET WITH HER HEART DOCTOR ABOUT THIS TOO. SHE ALSO WANTS TO KNOW WHAT HER LABS ARE FOR. IF ITS FOR THIS MEDICATION SHE WOULD LIKE TO GET 7/7 LABS CANCELLED.

## 2025-07-03 NOTE — TELEPHONE ENCOUNTER
Would advise that she get the A1c as her last one was 6.1.  Putting her at risk for diabetes.  If she wishes to delay the TSH and the magnesium till March that would be okay.

## 2025-07-07 ENCOUNTER — LAB (OUTPATIENT)
Dept: FAMILY MEDICINE CLINIC | Facility: CLINIC | Age: 75
End: 2025-07-07
Payer: MEDICARE

## 2025-07-07 DIAGNOSIS — R00.0 TACHYCARDIA: ICD-10-CM

## 2025-07-07 DIAGNOSIS — R73.9 HYPERGLYCEMIA: ICD-10-CM

## 2025-07-07 LAB
HBA1C MFR BLD: 6.1 % (ref 4.8–5.6)
MAGNESIUM SERPL-MCNC: 1.9 MG/DL (ref 1.6–2.4)
TSH SERPL DL<=0.05 MIU/L-ACNC: 2.02 UIU/ML (ref 0.27–4.2)

## 2025-07-07 PROCEDURE — 36415 COLL VENOUS BLD VENIPUNCTURE: CPT

## 2025-07-07 PROCEDURE — 83735 ASSAY OF MAGNESIUM: CPT | Performed by: PREVENTIVE MEDICINE

## 2025-07-07 PROCEDURE — 84443 ASSAY THYROID STIM HORMONE: CPT | Performed by: PREVENTIVE MEDICINE

## 2025-07-07 PROCEDURE — 83036 HEMOGLOBIN GLYCOSYLATED A1C: CPT | Performed by: PREVENTIVE MEDICINE

## 2025-07-08 ENCOUNTER — OFFICE VISIT (OUTPATIENT)
Dept: CARDIOLOGY | Facility: CLINIC | Age: 75
End: 2025-07-08
Payer: MEDICARE

## 2025-07-08 ENCOUNTER — RESULTS FOLLOW-UP (OUTPATIENT)
Dept: FAMILY MEDICINE CLINIC | Facility: CLINIC | Age: 75
End: 2025-07-08
Payer: MEDICARE

## 2025-07-08 VITALS
HEART RATE: 95 BPM | WEIGHT: 150 LBS | BODY MASS INDEX: 27.6 KG/M2 | HEIGHT: 62 IN | SYSTOLIC BLOOD PRESSURE: 130 MMHG | OXYGEN SATURATION: 97 % | DIASTOLIC BLOOD PRESSURE: 83 MMHG

## 2025-07-08 DIAGNOSIS — I10 PRIMARY HYPERTENSION: ICD-10-CM

## 2025-07-08 DIAGNOSIS — D69.2 NONTHROMBOCYTOPENIC PURPURA: ICD-10-CM

## 2025-07-08 DIAGNOSIS — E78.2 MIXED HYPERLIPIDEMIA: ICD-10-CM

## 2025-07-08 DIAGNOSIS — R00.2 PALPITATIONS: Primary | ICD-10-CM

## 2025-07-08 PROCEDURE — 93000 ELECTROCARDIOGRAM COMPLETE: CPT | Performed by: INTERNAL MEDICINE

## 2025-07-08 PROCEDURE — 3079F DIAST BP 80-89 MM HG: CPT | Performed by: INTERNAL MEDICINE

## 2025-07-08 PROCEDURE — 3075F SYST BP GE 130 - 139MM HG: CPT | Performed by: INTERNAL MEDICINE

## 2025-07-08 PROCEDURE — 1160F RVW MEDS BY RX/DR IN RCRD: CPT | Performed by: INTERNAL MEDICINE

## 2025-07-08 PROCEDURE — 99214 OFFICE O/P EST MOD 30 MIN: CPT | Performed by: INTERNAL MEDICINE

## 2025-07-08 PROCEDURE — 1159F MED LIST DOCD IN RCRD: CPT | Performed by: INTERNAL MEDICINE

## 2025-07-08 RX ORDER — METOPROLOL SUCCINATE 25 MG/1
25 TABLET, EXTENDED RELEASE ORAL DAILY
Qty: 90 TABLET | Refills: 3 | Status: SHIPPED | OUTPATIENT
Start: 2025-07-08

## 2025-07-08 NOTE — TELEPHONE ENCOUNTER
Your kidneys are functioning at less than 50% of normal.  Jardiance will help to correct this.  GFR of 60 means that you have 50% decrease in kidney function and your last study was 41.  The choice is certainly up to you.  Make sure that you also decrease your ibuprofen Aleve Motrin and limit x-ray dyes.  I would highly recommend the Jardiance

## 2025-07-08 NOTE — PROGRESS NOTES
A1c still shows excess risk for diabetes at 6.1 so watch your carbs and keep up your walking call if you have any other questions or concerns

## 2025-07-08 NOTE — PROGRESS NOTES
Cardiology Office Visit      Encounter Date:  2025    PATIENT IDENTIFICATION    Name: Elizabeth Momin  Age: 75 y.o. Sex: female : 1950  MRN: [id number]    Reason For Followup:  Palpitations    Brief Clinical History:  Dear Dr. Bryant, Lise Love MD    I had the pleasure of seeing Elizabeth Momin today. As you are well aware, this is a 75 y.o. female past medical history that is significant for history of hypertension history of hyperlipidemia family history of coronary artery disease symptoms of palpitations    Interval History:  Complaining of palpitations and increased heart rate  Patient prior symptoms of fatigue and weakness status post tick bite have improved with treatment  Patient is currently back on statin for elevated lipids  Complaining of symptoms of intermittent palpitations  No syncope  No heart failure symptoms  No orthopnea no PND  Assessment & Plan    Impressions:  Hypertension  Hyperlipidemia  History of breast cancer s/p radiation therapy and s/p mastectomy  Abnormal elevated liver enzymes  Cholelithiasis  Echocardiogram with normal LV systolic function with no significant valve pathology  Myocardial perfusion study with no significant reversible ischemia  Tachycardia sinus tachycardia intermittent PVC burden plans to start patient on a low-dose beta-blockers  Prior diagnosis and workup reviewed and discussed with patient  If the symptoms did not improve with the beta-blocker therapy consider extended Holter monitor for 1 month  Prior diagnosis and workup reviewed and discussed with patient  Patient is back on statin for elevated lipids in the setting of stable elevated liver enzymes    Palpitations  New diagnosis most likely secondary to sinus tachycardia and intermittent PVC burden  Plan to start patient on Toprol-XL 25 mg p.o. once a day      Hypertension  Blood pressure is well-controlled  Continue current medical therapy with losartan 100 mg p.o. once a day Toprol-XL 25 mg  "p.o. once a day      Hyperlipidemia  Patient is back on statin 10 mg of Lipitor p.o. once a day advise close monitoring of liver enzymes  Recheck lipids in 3 months      Chronic renal insufficiency and also impaired fasting glucose patient was started on Jardiance 10 mg p.o. once a day      Prior workup labs medications reviewed and discussed with patient  Follow-up in office in 6 months      Diagnoses and all orders for this visit:    1. Primary hypertension (Primary)    2. Mixed hyperlipidemia    3. Nonthrombocytopenic purpura          Objective:    Vitals:  Vitals:    07/08/25 1245   BP: 130/83   Pulse: 95   SpO2: 97%   Weight: 68 kg (150 lb)   Height: 157.5 cm (62\")     Body mass index is 27.44 kg/m².      Physical Exam:    General: Alert, cooperative, no distress, appears stated age  Head:  Normocephalic, atraumatic, mucous membranes moist  Eyes:  Conjunctiva/corneas clear, EOM's intact     Neck:  Supple,  no bruit    Lungs: Clear to auscultation bilaterally, no wheezes rhonchi rales are noted  Chest wall: No tenderness  Heart::  Regular rate and rhythm, S1 and S2 normal, no murmur, rub or gallop  Abdomen: Soft, non-tender, nondistended bowel sounds active  Extremities: No cyanosis, clubbing, or edema  Pulses: 2+ and symmetric all extremities  Skin:  No rashes or lesions  Neuro/psych: A&O x3. CN II through XII are grossly intact with appropriate affect      Allergies:  No Known Allergies    Medication Review:     Current Outpatient Medications:     acetaminophen (TYLENOL) 650 MG 8 hr tablet, Take 1 tablet by mouth., Disp: , Rfl:     amitriptyline (ELAVIL) 50 MG tablet, TAKE 1 TABLET BY MOUTH EVERY NIGHT, Disp: 90 tablet, Rfl: 0    atorvastatin (Lipitor) 10 MG tablet, Take 1 tablet by mouth Daily., Disp: 90 tablet, Rfl: 3    cetirizine (zyrTEC) 10 MG tablet, TAKE 1 TABLET BY MOUTH EVERY DAY, Disp: 90 tablet, Rfl: 3    empagliflozin (Jardiance) 10 MG tablet tablet, Take 1 tablet by mouth Daily., Disp: 30 tablet, " Rfl: 1    glucosamine-chondroitin 500-400 MG capsule capsule, Take  by mouth 3 (Three) Times a Day With Meals. (Patient taking differently: Take  by mouth Daily.), Disp: , Rfl:     losartan (COZAAR) 100 MG tablet, TAKE 1 TABLET BY MOUTH DAILY, Disp: 90 tablet, Rfl: 0    MILK THISTLE PO, Take  by mouth., Disp: , Rfl:     Multiple Vitamin (MULTI-VITAMIN) tablet, Take 1 tablet by mouth Daily., Disp: , Rfl:     NON FORMULARY, Nutricalm, Disp: , Rfl:     Omega-3 Fatty Acids (Omega-3 Fish Oil) 500 MG capsule, Take 1 capsule by mouth Daily., Disp: , Rfl:     Family History:  Family History   Problem Relation Age of Onset    Hypertension Mother     Stroke Mother     Diabetes Sister     Hypertension Sister     Mental illness Sister         Psychiatric Care - Paranoid Schizophrenia    Hypertension Brother     Cancer Other         Aunt Colon Cancer. Nephew Lung Cancer (heavy smoker).     Asthma Sister        Past Medical History:  Past Medical History:   Diagnosis Date    Allergic rhinitis     Cancer 2019    Had mastectomy in right breast. Followed with rediation    Chronic insomnia     Chronic sinusitis     Fibrocystic breast disease     History of skin cancer     Hx of radiation therapy 2019    right    Hyperlipidemia     Taking a statin.    Hypertension     Taking Losartin blood pressure under control    Osteoarthritis     Osteopenia of multiple sites 2016       Past Surgical History:  Past Surgical History:   Procedure Laterality Date    APPENDECTOMY  1968    BREAST BIOPSY Right 2019    Invasive Mammary Carcinoma    BREAST LUMPECTOMY       SECTION      LYMPH NODE BIOPSY      During mastectomy    MASTECTOMY Right 10/02/2019    Fabrizio Bernabe    MASTECTOMY PARTIAL WITH AXILLARY LYMPH NODE EXCISION Right 2019    Dr. Storm Dinh    TOTAL ABDOMINAL HYSTERECTOMY WITH SALPINGO OOPHORECTOMY      Fibroids       Social History:  Social History     Socioeconomic History    Marital status:    Tobacco  Use    Smoking status: Never     Passive exposure: Never    Smokeless tobacco: Never    Tobacco comments:     Passive Smoke Exposure: No    Vaping Use    Vaping status: Never Used   Substance and Sexual Activity    Alcohol use: No    Drug use: No    Sexual activity: Not Currently     Partners: Male     Birth control/protection: None, Hysterectomy       Review of Systems:  The following systems were reviewed as they relate to the cardiovascular system: Constitutional, Eyes, ENT, Cardiovascular, Respiratory, Gastrointestinal, Integumentary, Neurological, Psychiatric, Hematologic, Endocrine, Musculoskeletal, and Genitourinary. The pertinent cardiovascular findings are reported above with all other cardiovascular points within those systems being negative.    Diagnostic Study Review:     Current Electrocardiogram:    ECG 12 Lead    Date/Time: 7/8/2025 1:06 PM  Performed by: Georges Sykes MD    Authorized by: Georges Sykes MD  Comparison: compared with previous ECG   Similar to previous ECG  Rhythm: sinus rhythm  Ectopy: unifocal PVCs  Rate: normal  BPM: 96  Conduction: conduction normal  QRS axis: normal  Other findings: non-specific ST-T wave changes and poor R wave progression    Clinical impression: abnormal EKG          Laboratory Data:  Lab Results   Component Value Date    GLUCOSE 108 (H) 03/11/2025    BUN 17 03/11/2025    CREATININE 1.07 (H) 04/08/2025    EGFRIFNONA 60 11/08/2021    EGFRIFAFRI >60 05/16/2022    BCR 14.7 03/11/2025    K 4.3 03/11/2025    CO2 25.5 03/11/2025    CALCIUM 10.5 03/11/2025    ALBUMIN 4.6 03/11/2025    LABIL2 1.3 05/16/2022    AST 52 (H) 03/11/2025    ALT 59 (H) 03/11/2025     Lab Results   Component Value Date    GLUCOSE 108 (H) 03/11/2025    CALCIUM 10.5 03/11/2025     03/11/2025    K 4.3 03/11/2025    CO2 25.5 03/11/2025     03/11/2025    BUN 17 03/11/2025    CREATININE 1.07 (H) 04/08/2025    EGFRIFAFRI >60 05/16/2022    EGFRIFNONA 60 11/08/2021    BCR  "14.7 03/11/2025    ANIONGAP 12.5 03/11/2025     Lab Results   Component Value Date    WBC 7.44 03/11/2025    HGB 14.3 03/11/2025    HCT 42.6 03/11/2025    MCV 98.2 (H) 03/11/2025     03/11/2025     Lab Results   Component Value Date    CHOL 277 (H) 03/11/2025    CHLPL 257 (H) 11/08/2021    TRIG 189 (H) 03/11/2025    HDL 48 03/11/2025     (H) 03/11/2025     Lab Results   Component Value Date    HGBA1C 6.10 (H) 07/07/2025     No results found for: \"INR\", \"PROTIME\"    Most Recent Echo:  Results for orders placed during the hospital encounter of 06/17/24    Adult Transthoracic Echo Complete W/ Cont if Necessary Per Protocol 06/17/2024  5:30 PM    Interpretation Summary    Left ventricular systolic function is normal. Calculated left ventricular EF = 51% Left ventricular ejection fraction appears to be 51 - 55%.    Left ventricular diastolic function is consistent with (grade I) impaired relaxation.    The left atrial cavity is mildly dilated.    Estimated right ventricular systolic pressure from tricuspid regurgitation is normal (<35 mmHg).       Most Recent Stress Test:  Results for orders placed during the hospital encounter of 06/17/24    Stress Test With Myocardial Perfusion One Day 06/17/2024  2:45 PM    Interpretation Summary    Myocardial perfusion imaging indicates a normal myocardial perfusion study with no evidence of ischemia. Impressions are consistent with a low risk study.    Left ventricular ejection fraction is normal.       Most Recent Cardiac Catheterization:   No results found for this or any previous visit.       NOTE: The following portions of the patient's note were reviewed, confirmed and/or updated this visit as appropriate: History of present illness/Interval history, physical examination, assessment & plan, allergies, current medications, past family history, past medical history, past social history, past surgical history and problem list.    Labs pertinent to today's visit on " "07/08/2025 (including but not limited to CBC, CMP, and lipid profiles) were requested from the patient's primary care provider/hospital/clinical laboratory.  If the labs were available for the visit, they were reviewed with the patient.  If they were not available, when received, special interest will be made to the labs pertinent to this visit.  The patient's most recent \"in-house\" labs are noted below and have been reviewed.  Outside labs pertinent to this visit are scanned into the record and have been reviewed.    Discussions held today, 07/08/2025,regarding procedures included risk, benefits, and options including but not limited to: Death, MI, stroke, pain, bleeding, infection, and possible need for vascular/thoracic/cardiothoracic surgery.    Copied information within this note was reviewed and is current as of 07/08/2025.    Assessment and plan noted herein represents the current plan of care as of 07/08/2025.    Significant resources from our office and staff are inherent in engaging this patient in a continuous and active collaborative plan of care related to their chronic cardiovascular conditions outlined herein.  The management of these conditions requires the direction of our service with specialized clinical knowledge, skills, and experience.  This collaborative care includes but is not limited to patient education, expectations and responsibilities, shared decision making around therapeutic goals, and shared commitments to achieve those goals.   "